# Patient Record
Sex: MALE | Race: WHITE | Employment: OTHER | URBAN - NONMETROPOLITAN AREA
[De-identification: names, ages, dates, MRNs, and addresses within clinical notes are randomized per-mention and may not be internally consistent; named-entity substitution may affect disease eponyms.]

---

## 2017-02-22 ENCOUNTER — TELEPHONE (OUTPATIENT)
Dept: CARDIOLOGY | Age: 72
End: 2017-02-22

## 2017-03-07 ENCOUNTER — TELEPHONE (OUTPATIENT)
Dept: CARDIOLOGY | Age: 72
End: 2017-03-07

## 2017-03-08 DIAGNOSIS — Z95.0 PACEMAKER: Primary | ICD-10-CM

## 2017-03-08 DIAGNOSIS — R00.1 BRADYCARDIA: ICD-10-CM

## 2017-03-08 PROCEDURE — 93296 REM INTERROG EVL PM/IDS: CPT | Performed by: CLINICAL NURSE SPECIALIST

## 2017-03-08 PROCEDURE — 93294 REM INTERROG EVL PM/LDLS PM: CPT | Performed by: CLINICAL NURSE SPECIALIST

## 2017-05-11 DIAGNOSIS — K21.9 GASTROESOPHAGEAL REFLUX DISEASE, ESOPHAGITIS PRESENCE NOT SPECIFIED: Primary | ICD-10-CM

## 2017-05-11 RX ORDER — OMEPRAZOLE 20 MG/1
40 CAPSULE, DELAYED RELEASE ORAL DAILY
Qty: 30 CAPSULE | Refills: 11 | Status: SHIPPED | OUTPATIENT
Start: 2017-05-11 | End: 2018-10-16 | Stop reason: SDUPTHER

## 2017-07-24 ENCOUNTER — OFFICE VISIT (OUTPATIENT)
Dept: UROLOGY | Age: 72
End: 2017-07-24
Payer: MEDICARE

## 2017-07-24 VITALS
OXYGEN SATURATION: 96 % | SYSTOLIC BLOOD PRESSURE: 128 MMHG | DIASTOLIC BLOOD PRESSURE: 74 MMHG | BODY MASS INDEX: 30.62 KG/M2 | TEMPERATURE: 97.2 F | WEIGHT: 231 LBS | HEIGHT: 73 IN | HEART RATE: 72 BPM

## 2017-07-24 DIAGNOSIS — N52.9 ERECTILE DYSFUNCTION OF ORGANIC ORIGIN: ICD-10-CM

## 2017-07-24 DIAGNOSIS — N40.1 BENIGN PROSTATIC HYPERPLASIA WITH LOWER URINARY TRACT SYMPTOMS, UNSPECIFIED MORPHOLOGY: Primary | ICD-10-CM

## 2017-07-24 LAB
BILIRUBIN, POC: NORMAL
BLOOD URINE, POC: NORMAL
CLARITY, POC: NORMAL
COLOR, POC: NORMAL
GLUCOSE URINE, POC: NORMAL
KETONES, POC: NORMAL
LEUKOCYTE EST, POC: NORMAL
NITRITE, POC: NORMAL
PH, POC: 5.5
PROTEIN, POC: NORMAL
SPECIFIC GRAVITY, POC: 1.02
UROBILINOGEN, POC: 0.2

## 2017-07-24 PROCEDURE — 1123F ACP DISCUSS/DSCN MKR DOCD: CPT | Performed by: UROLOGY

## 2017-07-24 PROCEDURE — G8419 CALC BMI OUT NRM PARAM NOF/U: HCPCS | Performed by: UROLOGY

## 2017-07-24 PROCEDURE — 1036F TOBACCO NON-USER: CPT | Performed by: UROLOGY

## 2017-07-24 PROCEDURE — 99214 OFFICE O/P EST MOD 30 MIN: CPT | Performed by: UROLOGY

## 2017-07-24 PROCEDURE — G8427 DOCREV CUR MEDS BY ELIG CLIN: HCPCS | Performed by: UROLOGY

## 2017-07-24 PROCEDURE — 81002 URINALYSIS NONAUTO W/O SCOPE: CPT | Performed by: UROLOGY

## 2017-07-24 PROCEDURE — 4040F PNEUMOC VAC/ADMIN/RCVD: CPT | Performed by: UROLOGY

## 2017-07-24 PROCEDURE — 3017F COLORECTAL CA SCREEN DOC REV: CPT | Performed by: UROLOGY

## 2017-07-24 ASSESSMENT — ENCOUNTER SYMPTOMS
BACK PAIN: 0
COUGH: 0
DOUBLE VISION: 0
BLURRED VISION: 0
ABDOMINAL PAIN: 0
VOMITING: 0
HEMOPTYSIS: 0
NAUSEA: 0

## 2017-08-08 ENCOUNTER — APPOINTMENT (OUTPATIENT)
Dept: GENERAL RADIOLOGY | Facility: HOSPITAL | Age: 72
End: 2017-08-08
Attending: FAMILY MEDICINE

## 2017-08-08 PROBLEM — Z95.0 PACEMAKER: Status: ACTIVE | Noted: 2017-08-08

## 2017-08-08 PROBLEM — R00.1 BRADYCARDIA: Status: ACTIVE | Noted: 2017-08-08

## 2017-08-08 PROBLEM — K27.9 PEPTIC ULCER DISEASE: Status: ACTIVE | Noted: 2017-08-08

## 2017-08-08 PROCEDURE — 71020 HC CHEST PA AND LATERAL: CPT

## 2017-08-17 ENCOUNTER — OFFICE VISIT (OUTPATIENT)
Dept: CARDIOLOGY | Age: 72
End: 2017-08-17
Payer: MEDICARE

## 2017-08-17 VITALS
HEART RATE: 71 BPM | BODY MASS INDEX: 30.75 KG/M2 | HEIGHT: 73 IN | WEIGHT: 232 LBS | SYSTOLIC BLOOD PRESSURE: 122 MMHG | DIASTOLIC BLOOD PRESSURE: 80 MMHG

## 2017-08-17 DIAGNOSIS — Z95.0 PACEMAKER: Primary | ICD-10-CM

## 2017-08-17 DIAGNOSIS — R00.1 BRADYCARDIA: ICD-10-CM

## 2017-08-17 PROCEDURE — 99213 OFFICE O/P EST LOW 20 MIN: CPT | Performed by: CLINICAL NURSE SPECIALIST

## 2017-08-17 PROCEDURE — 1036F TOBACCO NON-USER: CPT | Performed by: CLINICAL NURSE SPECIALIST

## 2017-08-17 PROCEDURE — 4040F PNEUMOC VAC/ADMIN/RCVD: CPT | Performed by: CLINICAL NURSE SPECIALIST

## 2017-08-17 PROCEDURE — G8427 DOCREV CUR MEDS BY ELIG CLIN: HCPCS | Performed by: CLINICAL NURSE SPECIALIST

## 2017-08-17 PROCEDURE — G8417 CALC BMI ABV UP PARAM F/U: HCPCS | Performed by: CLINICAL NURSE SPECIALIST

## 2017-08-17 PROCEDURE — 3017F COLORECTAL CA SCREEN DOC REV: CPT | Performed by: CLINICAL NURSE SPECIALIST

## 2017-08-17 PROCEDURE — 1123F ACP DISCUSS/DSCN MKR DOCD: CPT | Performed by: CLINICAL NURSE SPECIALIST

## 2017-08-17 PROCEDURE — 93288 INTERROG EVL PM/LDLS PM IP: CPT | Performed by: CLINICAL NURSE SPECIALIST

## 2017-11-20 DIAGNOSIS — R00.1 BRADYCARDIA: ICD-10-CM

## 2017-11-20 DIAGNOSIS — Z95.0 PACEMAKER: Primary | ICD-10-CM

## 2017-11-20 PROCEDURE — 93294 REM INTERROG EVL PM/LDLS PM: CPT | Performed by: NURSE PRACTITIONER

## 2017-11-20 PROCEDURE — 93296 REM INTERROG EVL PM/IDS: CPT | Performed by: NURSE PRACTITIONER

## 2018-02-22 ENCOUNTER — TELEPHONE (OUTPATIENT)
Dept: CARDIOLOGY | Age: 73
End: 2018-02-22

## 2018-02-23 DIAGNOSIS — Z95.0 PACEMAKER: Primary | ICD-10-CM

## 2018-02-23 DIAGNOSIS — R00.1 BRADYCARDIA: ICD-10-CM

## 2018-02-23 PROCEDURE — 93294 REM INTERROG EVL PM/LDLS PM: CPT | Performed by: CLINICAL NURSE SPECIALIST

## 2018-02-23 PROCEDURE — 93296 REM INTERROG EVL PM/IDS: CPT | Performed by: CLINICAL NURSE SPECIALIST

## 2018-05-29 ENCOUNTER — TELEPHONE (OUTPATIENT)
Dept: CARDIOLOGY | Age: 73
End: 2018-05-29

## 2018-06-07 ENCOUNTER — TELEPHONE (OUTPATIENT)
Dept: GASTROENTEROLOGY | Facility: CLINIC | Age: 73
End: 2018-06-07

## 2018-06-07 DIAGNOSIS — Z95.0 PACEMAKER: Primary | ICD-10-CM

## 2018-06-07 DIAGNOSIS — R00.1 BRADYCARDIA: ICD-10-CM

## 2018-06-07 PROCEDURE — 93294 REM INTERROG EVL PM/LDLS PM: CPT | Performed by: CLINICAL NURSE SPECIALIST

## 2018-06-07 PROCEDURE — 93296 REM INTERROG EVL PM/IDS: CPT | Performed by: CLINICAL NURSE SPECIALIST

## 2018-06-07 NOTE — TELEPHONE ENCOUNTER
Patients wife called requesting refill of Omeprzole 40 mg 1 daily 90 day supply phoned to [facility] [place] pt agreed to make follow up he hasn't been seen here in over 2 years I phoned in # [phone] per Dr. Yap

## 2018-07-18 DIAGNOSIS — K21.9 GASTROESOPHAGEAL REFLUX DISEASE, ESOPHAGITIS PRESENCE NOT SPECIFIED: Primary | ICD-10-CM

## 2018-07-19 RX ORDER — OMEPRAZOLE 40 MG/1
40 CAPSULE, DELAYED RELEASE ORAL DAILY
Qty: 90 CAPSULE | Refills: 1 | Status: SHIPPED | OUTPATIENT
Start: 2018-07-19 | End: 2018-10-16 | Stop reason: SDUPTHER

## 2018-07-25 DIAGNOSIS — N40.1 BENIGN PROSTATIC HYPERPLASIA WITH LOWER URINARY TRACT SYMPTOMS: ICD-10-CM

## 2018-07-25 LAB — PROSTATE SPECIFIC ANTIGEN: 2.59 NG/ML (ref 0–4)

## 2018-08-29 ENCOUNTER — OFFICE VISIT (OUTPATIENT)
Dept: CARDIOLOGY | Age: 73
End: 2018-08-29
Payer: MEDICARE

## 2018-08-29 VITALS
WEIGHT: 231 LBS | DIASTOLIC BLOOD PRESSURE: 76 MMHG | SYSTOLIC BLOOD PRESSURE: 112 MMHG | HEIGHT: 73 IN | HEART RATE: 68 BPM | BODY MASS INDEX: 30.62 KG/M2

## 2018-08-29 DIAGNOSIS — R00.1 BRADYCARDIA: ICD-10-CM

## 2018-08-29 DIAGNOSIS — Z95.0 PACEMAKER: Primary | ICD-10-CM

## 2018-08-29 PROCEDURE — 4040F PNEUMOC VAC/ADMIN/RCVD: CPT | Performed by: INTERNAL MEDICINE

## 2018-08-29 PROCEDURE — G8417 CALC BMI ABV UP PARAM F/U: HCPCS | Performed by: INTERNAL MEDICINE

## 2018-08-29 PROCEDURE — 1101F PT FALLS ASSESS-DOCD LE1/YR: CPT | Performed by: INTERNAL MEDICINE

## 2018-08-29 PROCEDURE — 1036F TOBACCO NON-USER: CPT | Performed by: INTERNAL MEDICINE

## 2018-08-29 PROCEDURE — 99212 OFFICE O/P EST SF 10 MIN: CPT | Performed by: INTERNAL MEDICINE

## 2018-08-29 PROCEDURE — G8427 DOCREV CUR MEDS BY ELIG CLIN: HCPCS | Performed by: INTERNAL MEDICINE

## 2018-08-29 PROCEDURE — 3017F COLORECTAL CA SCREEN DOC REV: CPT | Performed by: INTERNAL MEDICINE

## 2018-08-29 PROCEDURE — 1123F ACP DISCUSS/DSCN MKR DOCD: CPT | Performed by: INTERNAL MEDICINE

## 2018-08-29 PROCEDURE — 93280 PM DEVICE PROGR EVAL DUAL: CPT | Performed by: INTERNAL MEDICINE

## 2018-08-29 RX ORDER — LISINOPRIL 10 MG/1
10 TABLET ORAL DAILY
COMMUNITY

## 2018-08-29 RX ORDER — LOVASTATIN 40 MG/1
40 TABLET ORAL NIGHTLY
COMMUNITY

## 2018-08-29 ASSESSMENT — ENCOUNTER SYMPTOMS
BACK PAIN: 0
CHOKING: 0
BLOOD IN STOOL: 0
NAUSEA: 0
APNEA: 0
COUGH: 0
WHEEZING: 0
ABDOMINAL DISTENTION: 0
SHORTNESS OF BREATH: 0
CHEST TIGHTNESS: 0

## 2018-08-29 NOTE — PROGRESS NOTES
confusion, dysphoric mood and sleep disturbance. The patient is not nervous/anxious. /76   Pulse 68   Ht 6' 1\" (1.854 m)   Wt 231 lb (104.8 kg)   BMI 30.48 kg/m²   Physical Exam   Constitutional: He is oriented to person, place, and time. He appears well-developed and well-nourished. No distress. Nagi complexion    HENT:   Head: Normocephalic. Eyes: Pupils are equal, round, and reactive to light. Conjunctivae and EOM are normal.   Neck: No JVD present. Carotid bruit is not present. No thyromegaly present. Cardiovascular: Normal rate and regular rhythm. PMI is not displaced. Exam reveals no gallop and no friction rub. No murmur heard. Pulmonary/Chest: Breath sounds normal. No respiratory distress. He has no wheezes. He has no rales. He exhibits no tenderness. Pacer pocket well-healed   Abdominal: Soft. Bowel sounds are normal. He exhibits no distension and no mass. There is no tenderness. There is no rebound and no guarding. Mild abdominal obesity   Musculoskeletal: He exhibits no deformity. Neurological: He is alert and oriented to person, place, and time. Skin: Skin is warm. No rash noted. He is not diaphoretic. No erythema. No pallor. Vitals reviewed. No orders of the defined types were placed in this encounter. No orders of the defined types were placed in this encounter. ECG interpretation from today:   Pacer interrogation revealed a single two-hour episode of atrial fibrillation in May without any other abnormalities. Assessment / Plan:  1. Pacemaker - normal device function with single episode of AF lasting but two hours (unfortunately asymptomatic). No changes in therapy and does not prompt further address at this time. 2. Hypertension - controlled  3.  Dyslipidemia - reportedly controlled    Electronically sign by Myla Lord MD 8/29/2018 2:44 PM

## 2018-09-26 ENCOUNTER — OFFICE VISIT (OUTPATIENT)
Dept: UROLOGY | Age: 73
End: 2018-09-26
Payer: MEDICARE

## 2018-09-26 VITALS
HEIGHT: 73 IN | RESPIRATION RATE: 16 BRPM | TEMPERATURE: 96.8 F | WEIGHT: 230 LBS | BODY MASS INDEX: 30.48 KG/M2 | DIASTOLIC BLOOD PRESSURE: 86 MMHG | HEART RATE: 79 BPM | SYSTOLIC BLOOD PRESSURE: 135 MMHG

## 2018-09-26 DIAGNOSIS — N40.0 BENIGN PROSTATIC HYPERPLASIA, UNSPECIFIED WHETHER LOWER URINARY TRACT SYMPTOMS PRESENT: Primary | ICD-10-CM

## 2018-09-26 DIAGNOSIS — N52.9 ERECTILE DYSFUNCTION OF ORGANIC ORIGIN: ICD-10-CM

## 2018-09-26 LAB
BILIRUBIN, POC: NORMAL
BLOOD URINE, POC: NORMAL
CLARITY, POC: CLEAR
COLOR, POC: YELLOW
GLUCOSE URINE, POC: NORMAL
KETONES, POC: NORMAL
LEUKOCYTE EST, POC: NORMAL
NITRITE, POC: NORMAL
PH, POC: 7
PROTEIN, POC: NORMAL
SPECIFIC GRAVITY, POC: 1.01
UROBILINOGEN, POC: 0.2

## 2018-09-26 PROCEDURE — 1101F PT FALLS ASSESS-DOCD LE1/YR: CPT | Performed by: UROLOGY

## 2018-09-26 PROCEDURE — 1123F ACP DISCUSS/DSCN MKR DOCD: CPT | Performed by: UROLOGY

## 2018-09-26 PROCEDURE — G8427 DOCREV CUR MEDS BY ELIG CLIN: HCPCS | Performed by: UROLOGY

## 2018-09-26 PROCEDURE — 1036F TOBACCO NON-USER: CPT | Performed by: UROLOGY

## 2018-09-26 PROCEDURE — 4040F PNEUMOC VAC/ADMIN/RCVD: CPT | Performed by: UROLOGY

## 2018-09-26 PROCEDURE — G8417 CALC BMI ABV UP PARAM F/U: HCPCS | Performed by: UROLOGY

## 2018-09-26 PROCEDURE — 81003 URINALYSIS AUTO W/O SCOPE: CPT | Performed by: UROLOGY

## 2018-09-26 PROCEDURE — 99213 OFFICE O/P EST LOW 20 MIN: CPT | Performed by: UROLOGY

## 2018-09-26 PROCEDURE — 3017F COLORECTAL CA SCREEN DOC REV: CPT | Performed by: UROLOGY

## 2018-09-26 ASSESSMENT — ENCOUNTER SYMPTOMS
RESPIRATORY NEGATIVE: 1
EYES NEGATIVE: 1
GASTROINTESTINAL NEGATIVE: 1

## 2018-09-26 NOTE — LETTER
[facility]  [address] [place] [address] Drive, [address]  [address] [address]  Phone: [phone]  Fax: [phone]    Jennifer Giraldo MD        September 26, 2018     Dixie Giang MD  [address] [place] [address]  [address] [facility] [id number]      Patient: Jason Tolliver   MR Number: [id number]   YOB: 1945   Date of Visit: 9/26/2018       Dear Provider: Thank you for referring Cat Lal to me for evaluation. Below are the relevant portions of my assessment and plan of care. If you have questions, please do not hesitate to call me. I look forward to following Merlin along with you.     Sincerely,        Jennifer Giraldo MD

## 2018-09-26 NOTE — PROGRESS NOTES
Kiel Yao is a 74 y.o. male who presents today   Chief Complaint   Patient presents with    Follow-up     yearly follow up  bph  psa  done        Benign Prostatic Hypertrophy  Patient complains of lower urinary tract symptoms. He reports frequency, intermittency and nocturia two times a night. He denies straining. Patient states symptoms are of moderate severity. Onset of symptoms was several years ago and was gradual in onset. His AUA Symptom Score is, 14/35 . He has no personal history and no family history of prostate cancer. He reports a history of Erectile dysfunction. He is tried oral medication and this is not effective he says he desires no further intervention. He denies flank pain, gross hematuria, kidney stones and recurrent UTI. Past Medical History:   Diagnosis Date    BCC (basal cell carcinoma of skin)     BPH (benign prostatic hyperplasia) 6/15/2016    Bradycardia     Dysmetabolic syndrome     Elevated PSA     Fatty liver     Gout     Hypercholesteremia     Dr. Britney Neri manages    Hyperlipidemia     Hypertension     Pacemaker 9/9/13  MDL    generator replacement    Peptic ulcer disease     Stomach cancer ([facility] [address]        Past Surgical History:   Procedure Laterality Date    CERVICAL SPINE SURGERY      COLONOSCOPY      CYST REMOVAL      GASTRECTOMY      HEMICOLECTOMY      PACEMAKER INSERTION  9/6/13    generator change    UPPER GASTROINTESTINAL ENDOSCOPY         Current Outpatient Prescriptions   Medication Sig Dispense Refill    lisinopril (PRINIVIL;ZESTRIL) 10 MG tablet Take 10 mg by mouth daily      lovastatin (MEVACOR) 40 MG tablet Take 40 mg by mouth nightly      aspirin 81 MG tablet Take 81 mg by mouth daily      omeprazole (PRILOSEC) 40 MG capsule Take 1 capsule by mouth daily      cetirizine (ZYRTEC ALLERGY) 10 MG tablet Take 10 mg by mouth daily.  Multiple Vitamins-Minerals (THERAPEUTIC MULTIVITAMIN-MINERALS) tablet Take 1 tablet by mouth daily.       is enlarged (65 grams smooth no nodularity). Prostate is not tender. Right testis shows no mass, no swelling and no tenderness. Left testis shows no mass, no swelling and no tenderness. Circumcised. No phimosis. No discharge found. Musculoskeletal: Normal range of motion. He exhibits no edema or tenderness. Lymphadenopathy:     He has no cervical adenopathy. Right: No inguinal adenopathy present. Left: No inguinal adenopathy present. Neurological: He is alert and oriented to person, place, and time. Skin: Skin is warm and dry. Psychiatric: He has a normal mood and affect. His behavior is normal.   Nursing note and vitals reviewed. DATA:  CMP:    Lab Results   Component Value Date     09/09/2013    K 4.3 09/09/2013     09/09/2013    CO2 25 09/09/2013    BUN 16 09/09/2013    CREATININE 0.8 09/09/2013    LABGLOM 102 09/09/2013    GLUCOSE 110 09/09/2013    CALCIUM 9.2 09/09/2013     Results for orders placed or performed in visit on 09/26/18   POCT Urinalysis No Micro (Auto)   Result Value Ref Range    Color, UA yellow     Clarity, UA clear     Glucose, UA POC neg     Bilirubin, UA neg     Ketones, UA neg     Spec Grav, UA 1.015     Blood, UA POC neg     pH, UA 7.0     Protein, UA POC neg     Urobilinogen, UA 0.2     Leukocytes, UA neg     Nitrite, UA neg      Lab Results   Component Value Date    PSA 2.59 07/25/2018    PSA 2.91 06/10/2016         1. Benign prostatic hyperplasia, unspecified whether lower urinary tract symptoms present  Symptoms are stable and unchanged from one year ago. We also discussed medical management he is not interested at this time  - POCT Urinalysis No Micro (Auto)  - PSA, Diagnostic; Future    2.  Erectile dysfunction of organic origin  He failed oral medical therapy he desires no further management or intervention  - POCT Urinalysis No Micro (Auto)      Orders Placed This Encounter   Procedures    PSA, Diagnostic     In 1 year prior to the next visit     Standing Status:   Future     Standing Expiration Date:   9/26/2020    POCT Urinalysis No Micro (Auto)        Return in about 1 year (around 9/26/2019) for PSA prior to vext visit, Okay to follow-up with NETTE (NP/PA).

## 2018-10-16 ENCOUNTER — OFFICE VISIT (OUTPATIENT)
Dept: GASTROENTEROLOGY | Facility: CLINIC | Age: 73
End: 2018-10-16

## 2018-10-16 VITALS
WEIGHT: 229 LBS | BODY MASS INDEX: 30.35 KG/M2 | OXYGEN SATURATION: 98 % | DIASTOLIC BLOOD PRESSURE: 80 MMHG | HEART RATE: 86 BPM | SYSTOLIC BLOOD PRESSURE: 130 MMHG | HEIGHT: 73 IN

## 2018-10-16 DIAGNOSIS — K21.9 GASTROESOPHAGEAL REFLUX DISEASE, ESOPHAGITIS PRESENCE NOT SPECIFIED: Primary | ICD-10-CM

## 2018-10-16 DIAGNOSIS — Z78.9 NONSMOKER: ICD-10-CM

## 2018-10-16 DIAGNOSIS — E66.9 OBESITY, UNSPECIFIED OBESITY SEVERITY, UNSPECIFIED OBESITY TYPE: ICD-10-CM

## 2018-10-16 DIAGNOSIS — K22.70 BARRETT'S ESOPHAGUS WITHOUT DYSPLASIA: ICD-10-CM

## 2018-10-16 PROCEDURE — 99212 OFFICE O/P EST SF 10 MIN: CPT | Performed by: CLINICAL NURSE SPECIALIST

## 2018-10-16 RX ORDER — ASPIRIN 81 MG/1
81 TABLET ORAL DAILY
COMMUNITY

## 2018-10-16 RX ORDER — OMEPRAZOLE 40 MG/1
40 CAPSULE, DELAYED RELEASE ORAL DAILY
Qty: 90 CAPSULE | Refills: 4 | Status: SHIPPED | OUTPATIENT
Start: 2018-10-16 | End: 2019-11-20 | Stop reason: SDUPTHER

## 2018-10-16 RX ORDER — LISINOPRIL 10 MG/1
10 TABLET ORAL DAILY
COMMUNITY
End: 2020-10-13 | Stop reason: SDUPTHER

## 2018-10-16 NOTE — PROGRESS NOTES
Merlin W Ihnen  1945      10/16/2018  Chief Complaint   Patient presents with   • GI Problem     Barretts Esophagus needs refills of Omeprazole          HPI    Merlin W Ihnen is a  73 y.o. male here for a follow up visit for GERD with hx of Barretts. He is stable with his Omeprazole daily. No difficulty swallowing. No nausea or vomiting. No change in bowels. No BRBPR. No melena.     Past Medical History:   Diagnosis Date   • Cancer (CMS/HCC)    • Gastrointestinal stromal tumor (GIST) (CMS/HCC)    • GERD (gastroesophageal reflux disease)    • Liver cirrhosis (CMS/HCC)    • Squamous cell skin cancer      Past Surgical History:   Procedure Laterality Date   • COLON SURGERY     • COLONOSCOPY W/ POLYPECTOMY  07/19/2016    Polyp at 70 cm proximal to anus no changes of adenomatous or hyperplastic polyp identified repeat exam in 5 years   • COLONOSCOPY W/ POLYPECTOMY  04/19/2013    Early Tubular adenoma repeat exam in 3 years   • ENDOSCOPY  07/19/2016    Chronic esophagogastritis mild with Intestinal Metaplasia repeat exam in 3 years   • ENDOSCOPY  04/19/2013    Mild chronic inflammation negative for Intestinal Metaplasia    • NECK SURGERY     • PACEMAKER IMPLANTATION     • SQUAMOUS CELL CARCINOMA EXCISION         Outpatient Prescriptions Marked as Taking for the 10/16/18 encounter (Office Visit) with Mame Warner APRN   Medication Sig Dispense Refill   • aspirin 81 MG EC tablet Take 81 mg by mouth Daily. Three times a week     • Avanafil 200 MG tablet Take  by mouth.     • cetirizine (zyrTEC) 10 MG tablet Take  by mouth.     • Cholecalciferol (VITAMIN D3) 2000 UNITS capsule Take  by mouth.     • lisinopril (PRINIVIL,ZESTRIL) 10 MG tablet Take 10 mg by mouth Daily.     • lovastatin (MEVACOR) 20 MG tablet Take  by mouth.     • omeprazole (priLOSEC) 40 MG capsule Take 1 capsule by mouth Daily. 90 capsule 4   • therapeutic multivitamin-minerals (THERAGRAN-M) tablet Take  by mouth.     • [DISCONTINUED] omeprazole  (priLOSEC) 40 MG capsule Take 1 capsule by mouth Daily. 90 capsule 1       Allergies   Allergen Reactions   • Amoxicillin Itching   • Penicillins Itching       Social History     Social History   • Marital status:      Spouse name: N/A   • Number of children: N/A   • Years of education: N/A     Occupational History   • Not on file.     Social History Main Topics   • Smoking status: Former Smoker     Packs/day: 0.50     Years: 10.00     Types: Cigarettes   • Smokeless tobacco: Never Used   • Alcohol use No   • Drug use: No   • Sexual activity: Defer     Other Topics Concern   • Not on file     Social History Narrative   • No narrative on file       Family History   Problem Relation Age of Onset   • Colon polyps Neg Hx    • Colon cancer Neg Hx        Review of Systems   Constitutional: Negative for activity change, appetite change, chills, diaphoresis, fatigue, fever and unexpected weight change.   HENT: Negative for ear pain, hearing loss, mouth sores, sore throat, trouble swallowing and voice change.    Eyes: Negative.    Respiratory: Negative for cough, choking, shortness of breath and wheezing.    Cardiovascular: Negative for chest pain and palpitations.   Gastrointestinal: Negative for abdominal pain, blood in stool, constipation, diarrhea, nausea and vomiting.   Endocrine: Negative for cold intolerance and heat intolerance.   Genitourinary: Negative for decreased urine volume, dysuria, frequency, hematuria and urgency.   Musculoskeletal: Negative for back pain, gait problem and myalgias.   Skin: Negative for color change, pallor and rash.   Allergic/Immunologic: Negative for food allergies and immunocompromised state.   Neurological: Negative for dizziness, tremors, seizures, syncope, weakness, light-headedness, numbness and headaches.   Hematological: Negative for adenopathy. Does not bruise/bleed easily.   Psychiatric/Behavioral: Negative for agitation and confusion. The patient is not nervous/anxious.   "  All other systems reviewed and are negative.      /80   Pulse 86   Ht 185.4 cm (73\")   Wt 104 kg (229 lb)   SpO2 98%   BMI 30.21 kg/m²   Body mass index is 30.21 kg/m².    Physical Exam   Constitutional: He is oriented to person, place, and time. He appears well-developed and well-nourished.   HENT:   Head: Normocephalic and atraumatic.   Eyes: Pupils are equal, round, and reactive to light.   Neck: Normal range of motion. Neck supple. No tracheal deviation present.   Cardiovascular: Normal rate, regular rhythm and normal heart sounds.  Exam reveals no gallop and no friction rub.    No murmur heard.  Pulmonary/Chest: Effort normal and breath sounds normal. No respiratory distress. He has no wheezes. He has no rales. He exhibits no tenderness.   Abdominal: Soft. Bowel sounds are normal. He exhibits no distension. There is no hepatosplenomegaly. There is no tenderness. There is no rigidity, no rebound and no guarding.   Musculoskeletal: Normal range of motion. He exhibits no edema, tenderness or deformity.   Neurological: He is alert and oriented to person, place, and time. He has normal reflexes.   Skin: Skin is warm and dry. No rash noted. No pallor.   Psychiatric: He has a normal mood and affect. His behavior is normal. Judgment and thought content normal.       ASSESSMENT AND PLAN    Merlin was seen today for gi problem.    Diagnoses and all orders for this visit:    Gastroesophageal reflux disease, esophagitis presence not specified  -     omeprazole (priLOSEC) 40 MG capsule; Take 1 capsule by mouth Daily.    Fleming's esophagus without dysplasia    Nonsmoker    Obesity, unspecified obesity severity, unspecified obesity type      Cont ongoing therapy.   I discussed non pharmaceutical treatment of gerd.  This includes gradually losing weight to achieve ideal body wt., elevation of the head of bed by 4-6 inches, nothing to eat or drink 3 hours prior to lying down, avoiding tight clothing, stress " reduction, tobacco cessation, reduction of alcohol intake, and dietary restrictions (avoiding caffeine, coffee, fatty foods, mints, chocolate, spicy foods and tomato based sauces as much as possible).        There are no Patient Instructions on file for this visit.  Mame Warner, MARIAN  11:19 AM  10/16/2018    Obesity, Adult  Obesity is the condition of having too much total body fat. Being overweight or obese means that your weight is greater than what is considered healthy for your body size. Obesity is determined by a measurement called BMI. BMI is an estimate of body fat and is calculated from height and weight. For adults, a BMI of 30 or higher is considered obese.  Obesity can eventually lead to other health concerns and major illnesses, including:  · Stroke.  · Coronary artery disease (CAD).  · Type 2 diabetes.  · Some types of cancer, including cancers of the colon, breast, uterus, and gallbladder.  · Osteoarthritis.  · High blood pressure (hypertension).  · High cholesterol.  · Sleep apnea.  · Gallbladder stones.  · Infertility problems.  What are the causes?  The main cause of obesity is taking in (consuming) more calories than your body uses for energy. Other factors that contribute to this condition may include:  · Being born with genes that make you more likely to become obese.  · Having a medical condition that causes obesity. These conditions include:  ¨ Hypothyroidism.  ¨ Polycystic ovarian syndrome (PCOS).  ¨ Binge-eating disorder.  ¨ Cushing syndrome.  · Taking certain medicines, such as steroids, antidepressants, and seizure medicines.  · Not being physically active (sedentary lifestyle).  · Living where there are limited places to exercise safely or buy healthy foods.  · Not getting enough sleep.  What increases the risk?  The following factors may increase your risk of this condition:  · Having a family history of obesity.  · Being a woman of -American descent.  · Being a man of   descent.  What are the signs or symptoms?  Having excessive body fat is the main symptom of this condition.  How is this diagnosed?  This condition may be diagnosed based on:  · Your symptoms.  · Your medical history.  · A physical exam. Your health care provider may measure:  ¨ Your BMI. If you are an adult with a BMI between 25 and less than 30, you are considered overweight. If you are an adult with a BMI of 30 or higher, you are considered obese.  ¨ The distances around your hips and your waist (circumferences). These may be compared to each other to help diagnose your condition.  ¨ Your skinfold thickness. Your health care provider may gently pinch a fold of your skin and measure it.  How is this treated?  Treatment for this condition often includes changing your lifestyle. Treatment may include some or all of the following:  · Dietary changes. Work with your health care provider and a dietitian to set a weight-loss goal that is healthy and reasonable for you. Dietary changes may include eating:  ¨ Smaller portions. A portion size is the amount of a particular food that is healthy for you to eat at one time. This varies from person to person.  ¨ Low-calorie or low-fat options.  ¨ More whole grains, fruits, and vegetables.  · Regular physical activity. This may include aerobic activity (cardio) and strength training.  · Medicine to help you lose weight. Your health care provider may prescribe medicine if you are unable to lose 1 pound a week after 6 weeks of eating more healthily and doing more physical activity.  · Surgery. Surgical options may include gastric banding and gastric bypass. Surgery may be done if:  ¨ Other treatments have not helped to improve your condition.  ¨ You have a BMI of 40 or higher.  ¨ You have life-threatening health problems related to obesity.  Follow these instructions at home:     Eating and drinking     · Follow recommendations from your health care provider about what  you eat and drink. Your health care provider may advise you to:  ¨ Limit fast foods, sweets, and processed snack foods.  ¨ Choose low-fat options, such as low-fat milk instead of whole milk.  ¨ Eat 5 or more servings of fruits or vegetables every day.  ¨ Eat at home more often. This gives you more control over what you eat.  ¨ Choose healthy foods when you eat out.  ¨ Learn what a healthy portion size is.  ¨ Keep low-fat snacks on hand.  ¨ Avoid sugary drinks, such as soda, fruit juice, iced tea sweetened with sugar, and flavored milk.  ¨ Eat a healthy breakfast.  · Drink enough water to keep your urine clear or pale yellow.  · Do not go without eating for long periods of time (do not fast) or follow a fad diet. Fasting and fad diets can be unhealthy and even dangerous.  Physical Activity   · Exercise regularly, as told by your health care provider. Ask your health care provider what types of exercise are safe for you and how often you should exercise.  · Warm up and stretch before being active.  · Cool down and stretch after being active.  · Rest between periods of activity.  Lifestyle   · Limit the time that you spend in front of your TV, computer, or video game system.  · Find ways to reward yourself that do not involve food.  · Limit alcohol intake to no more than 1 drink a day for nonpregnant women and 2 drinks a day for men. One drink equals 12 oz of beer, 5 oz of wine, or 1½ oz of hard liquor.  General instructions   · Keep a weight loss journal to keep track of the food you eat and how much you exercise you get.  · Take over-the-counter and prescription medicines only as told by your health care provider.  · Take vitamins and supplements only as told by your health care provider.  · Consider joining a support group. Your health care provider may be able to recommend a support group.  · Keep all follow-up visits as told by your health care provider. This is important.  Contact a health care provider  if:  · You are unable to meet your weight loss goal after 6 weeks of dietary and lifestyle changes.  This information is not intended to replace advice given to you by your health care provider. Make sure you discuss any questions you have with your health care provider.  Document Released: 01/25/2006 Document Revised: 05/22/2017 Document Reviewed: 10/05/2016  [employer] Patient Education © 2017 [employer]      IF YOU SMOKE OR USE TOBACCO PLEASE READ THE FOLLOWING:    Why is smoking bad for me?  Smoking increases the risk of heart disease, lung disease, vascular disease, stroke, and cancer.     If you smoke, STOP!    If you would like more information on quitting smoking, please visit the [employer] website: www.[employer]/corporate/healthier-together/smoke   This link will provide additional resources including the QUIT line and the Beat the Pack support groups.     For more information:    Quit Now [place]  1-800-QUIT-NOW  https://[place].quitlogix.org/en-[place]/

## 2018-10-26 ENCOUNTER — TELEPHONE (OUTPATIENT)
Dept: CARDIOLOGY | Age: 73
End: 2018-10-26

## 2018-11-28 DIAGNOSIS — Z95.0 PACEMAKER: Primary | ICD-10-CM

## 2018-11-28 DIAGNOSIS — R00.1 BRADYCARDIA: ICD-10-CM

## 2018-11-28 PROCEDURE — 93296 REM INTERROG EVL PM/IDS: CPT | Performed by: CLINICAL NURSE SPECIALIST

## 2018-11-28 PROCEDURE — 93294 REM INTERROG EVL PM/LDLS PM: CPT | Performed by: CLINICAL NURSE SPECIALIST

## 2019-02-28 DIAGNOSIS — R00.1 BRADYCARDIA: ICD-10-CM

## 2019-02-28 DIAGNOSIS — Z95.0 PACEMAKER: Primary | ICD-10-CM

## 2019-03-01 PROCEDURE — 93294 REM INTERROG EVL PM/LDLS PM: CPT | Performed by: CLINICAL NURSE SPECIALIST

## 2019-03-01 PROCEDURE — 93296 REM INTERROG EVL PM/IDS: CPT | Performed by: CLINICAL NURSE SPECIALIST

## 2019-05-30 DIAGNOSIS — Z95.0 PACEMAKER: Primary | ICD-10-CM

## 2019-05-30 DIAGNOSIS — R00.1 BRADYCARDIA: ICD-10-CM

## 2019-05-30 PROCEDURE — 99999 PR OFFICE/OUTPT VISIT,PROCEDURE ONLY: CPT | Performed by: NURSE PRACTITIONER

## 2019-06-19 ENCOUNTER — OFFICE VISIT (OUTPATIENT)
Dept: GASTROENTEROLOGY | Facility: CLINIC | Age: 74
End: 2019-06-19

## 2019-06-19 VITALS
SYSTOLIC BLOOD PRESSURE: 132 MMHG | BODY MASS INDEX: 29.16 KG/M2 | DIASTOLIC BLOOD PRESSURE: 70 MMHG | OXYGEN SATURATION: 98 % | HEART RATE: 72 BPM | HEIGHT: 73 IN | WEIGHT: 220 LBS

## 2019-06-19 DIAGNOSIS — I10 HTN (HYPERTENSION), BENIGN: ICD-10-CM

## 2019-06-19 DIAGNOSIS — K21.00 GASTROESOPHAGEAL REFLUX DISEASE WITH ESOPHAGITIS: ICD-10-CM

## 2019-06-19 DIAGNOSIS — K22.70 BARRETT'S ESOPHAGUS WITHOUT DYSPLASIA: Primary | ICD-10-CM

## 2019-06-19 DIAGNOSIS — Z78.9 NONSMOKER: ICD-10-CM

## 2019-06-19 DIAGNOSIS — E66.9 OBESITY, UNSPECIFIED OBESITY SEVERITY, UNSPECIFIED OBESITY TYPE: ICD-10-CM

## 2019-06-19 PROCEDURE — 99213 OFFICE O/P EST LOW 20 MIN: CPT | Performed by: CLINICAL NURSE SPECIALIST

## 2019-06-19 NOTE — PROGRESS NOTES
Chief Complaint   Patient presents with   • Endoscopy     Subjective   HPI  Merlin W Ihnen is a 74 y.o. male who presents with hx of Barretts esophagus determined by biopsy. Course is constant.  Disease is stable , maintains on Prilosec for the management of this. Last Endoscopy reviewed. He has no complaints of nausea or vomiting. No change in bowels. No wt loss. No BRBPR. No melena. No abdominal pain or dysphagia.  Past Medical History:   Diagnosis Date   • Cancer (CMS/HCC)    • Gastrointestinal stromal tumor (GIST) (CMS/HCC)    • GERD (gastroesophageal reflux disease)    • Liver cirrhosis (CMS/HCC)    • Squamous cell skin cancer      Past Surgical History:   Procedure Laterality Date   • COLON SURGERY     • COLONOSCOPY W/ POLYPECTOMY  07/19/2016    Polyp at 70 cm proximal to anus no changes of adenomatous or hyperplastic polyp identified repeat exam in 5 years   • COLONOSCOPY W/ POLYPECTOMY  04/19/2013    Early Tubular adenoma repeat exam in 3 years   • ENDOSCOPY  07/19/2016    Chronic esophagogastritis mild with Intestinal Metaplasia repeat exam in 3 years   • ENDOSCOPY  04/19/2013    Mild chronic inflammation negative for Intestinal Metaplasia    • NECK SURGERY     • PACEMAKER IMPLANTATION     • SQUAMOUS CELL CARCINOMA EXCISION             Current Outpatient Medications:   •  aspirin 81 MG EC tablet, Take 81 mg by mouth Daily. Three times a week, Disp: , Rfl:   •  Avanafil 200 MG tablet, Take  by mouth., Disp: , Rfl:   •  cetirizine (zyrTEC) 10 MG tablet, Take  by mouth., Disp: , Rfl:   •  Cholecalciferol (VITAMIN D3) 2000 UNITS capsule, Take  by mouth., Disp: , Rfl:   •  lisinopril (PRINIVIL,ZESTRIL) 10 MG tablet, Take 10 mg by mouth Daily., Disp: , Rfl:   •  lovastatin (MEVACOR) 20 MG tablet, Take  by mouth., Disp: , Rfl:   •  omeprazole (priLOSEC) 40 MG capsule, Take 1 capsule by mouth Daily., Disp: 90 capsule, Rfl: 4  •  therapeutic multivitamin-minerals (THERAGRAN-M) tablet, Take  by mouth., Disp: , Rfl:    Allergies   Allergen Reactions   • Amoxicillin Itching   • Penicillins Itching     Social History     Socioeconomic History   • Marital status:      Spouse name: Not on file   • Number of children: Not on file   • Years of education: Not on file   • Highest education level: Not on file   Tobacco Use   • Smoking status: Former Smoker     Packs/day: 0.50     Years: 10.00     Pack years: 5.00     Types: Cigarettes   • Smokeless tobacco: Never Used   Substance and Sexual Activity   • Alcohol use: No   • Drug use: No   • Sexual activity: Defer     Family History   Problem Relation Age of Onset   • Colon polyps Neg Hx    • Colon cancer Neg Hx      Health Maintenance   Topic Date Due   • TDAP/TD VACCINES (1 - Tdap) 01/30/1962   • ZOSTER VACCINE (1 of 2) 01/30/1993   • PNEUMOCOCCAL VACCINES (65+ LOW/MEDIUM RISK) (1 of 2 - PCV13) 01/30/2008   • MEDICARE ANNUAL WELLNESS  05/11/2017   • INFLUENZA VACCINE  08/01/2019   • COLONOSCOPY  07/19/2026     Review of Systems   Constitutional: Negative for activity change, appetite change, chills, diaphoresis, fatigue, fever and unexpected weight change.   HENT: Negative for ear pain, hearing loss, mouth sores, sore throat, trouble swallowing and voice change.    Eyes: Negative.    Respiratory: Negative for cough, choking, shortness of breath and wheezing.    Cardiovascular: Negative for chest pain and palpitations.   Gastrointestinal: Negative for abdominal pain, blood in stool, constipation, diarrhea, nausea and vomiting.   Endocrine: Negative for cold intolerance and heat intolerance.   Genitourinary: Negative for decreased urine volume, dysuria, frequency, hematuria and urgency.   Musculoskeletal: Negative for back pain, gait problem and myalgias.   Skin: Negative for color change, pallor and rash.   Allergic/Immunologic: Negative for food allergies and immunocompromised state.   Neurological: Negative for dizziness, tremors, seizures, syncope, weakness, light-headedness,  "numbness and headaches.   Hematological: Negative for adenopathy. Does not bruise/bleed easily.   Psychiatric/Behavioral: Negative for agitation and confusion. The patient is not nervous/anxious.    All other systems reviewed and are negative.    Objective   Vitals:    06/19/19 0953   BP: 132/70   Pulse: 72   SpO2: 98%   Weight: 99.8 kg (220 lb)   Height: 185.4 cm (73\")     Body mass index is 29.03 kg/m².    Physical Exam   Constitutional: He is oriented to person, place, and time. He appears well-developed and well-nourished.   HENT:   Head: Normocephalic and atraumatic.   Eyes: Pupils are equal, round, and reactive to light.   Neck: Normal range of motion. Neck supple. No tracheal deviation present.   Cardiovascular: Normal rate, regular rhythm and normal heart sounds. Exam reveals no gallop and no friction rub.   No murmur heard.  Pulmonary/Chest: Effort normal and breath sounds normal. No respiratory distress. He has no wheezes. He has no rales. He exhibits no tenderness.   Abdominal: Soft. Bowel sounds are normal. He exhibits no distension. There is no hepatosplenomegaly. There is no tenderness. There is no rigidity, no rebound and no guarding.   Musculoskeletal: Normal range of motion. He exhibits no edema, tenderness or deformity.   Neurological: He is alert and oriented to person, place, and time. He has normal reflexes.   Skin: Skin is warm and dry. No rash noted. No pallor.   Psychiatric: He has a normal mood and affect. His behavior is normal. Judgment and thought content normal.       Assessment/Plan   Merlin was seen today for endoscopy.    Diagnoses and all orders for this visit:    Fleming's esophagus without dysplasia  -     Case Request; Standing  -     Follow Anesthesia Guidelines / Standing Orders; Future  -     Obtain Informed Consent; Future  -     Implement Anesthesia Orders Day of Procedure; Standing  -     Obtain Informed Consent; Standing  -     Case Request    Gastroesophageal reflux " disease with esophagitis    Nonsmoker    Obesity, unspecified obesity severity, unspecified obesity type    HTN (hypertension), benign  Comments:  cont bp medication the day of procedure     I discussed non pharmaceutical treatment of gerd.  This includes gradually losing weight to achieve ideal body wt., elevation of the head of bed by 4-6 inches, nothing to eat or drink 3 hours prior to lying down, avoiding tight clothing, stress reduction, tobacco cessation, reduction of alcohol intake, and dietary restrictions (avoiding caffeine, coffee, fatty foods, mints, chocolate, spicy foods and tomato based sauces as much as possible).      ESOPHAGOGASTRODUODENOSCOPY WITH ANESTHESIA (N/A)  Patient's Body mass index is 29.03 kg/m². BMI is above normal parameters. Recommendations include: nutrition counseling.      MARIAN Cadena  6/19/2019  10:28 AM      IF YOU SMOKE OR USE TOBACCO PLEASE READ THE FOLLOWING:   3.5 minutes provided    Why is smoking bad for me?  Smoking increases the risk of heart disease, lung disease, vascular disease, stroke, and cancer.     If you smoke, STOP!    If you would like more information on quitting smoking, please visit the [employer] website: [email or web address]   This link will provide additional resources including the QUIT line and the Beat the Pack support groups.     For more information:    Quit Now [place]  1-800-QUIT-NOW  https://[email or web address]/en-US/    Obesity, Adult  Obesity is having too much body fat. If you have a BMI of 30 or more, you are obese. BMI is a number that explains how much body fat you have. Obesity is often caused by taking in (consuming) more calories than your body uses.  Obesity can cause serious health problems. Changing your lifestyle can help to treat obesity.  Follow these instructions at home:  Eating and drinking     · Follow advice from your doctor about what to eat and drink. Your  doctor may tell you to:  ¨ Cut down on (limit) fast foods, sweets, and processed snack foods.  ¨ Choose low-fat options. For example, choose low-fat milk instead of whole milk.  ¨ Eat 5 or more servings of fruits or vegetables every day.  ¨ Eat at home more often. This gives you more control over what you eat.  ¨ Choose healthy foods when you eat out.  ¨ Learn what a healthy portion size is. A portion size is the amount of a certain food that is healthy for you to eat at one time. This is different for each person.  ¨ Keep low-fat snacks available.  ¨ Avoid sugary drinks. These include soda, fruit juice, iced tea that is sweetened with sugar, and flavored milk.  ¨ Eat a healthy breakfast.  · Drink enough water to keep your pee (urine) clear or pale yellow.  · Do not go without eating for long periods of time (do not fast).  · Do not go on popular or trendy diets (fad diets).  Physical Activity   · Exercise often, as told by your doctor. Ask your doctor:  ¨ What types of exercise are safe for you.  ¨ How often you should exercise.  · Warm up and stretch before being active.  · Do slow stretching after being active (cool down).  · Rest between times of being active.  Lifestyle   · Limit how much time you spend in front of your TV, computer, or video game system (be less sedentary).  · Find ways to reward yourself that do not involve food.  · Limit alcohol intake to no more than 1 drink a day for nonpregnant women and 2 drinks a day for men. One drink equals 12 oz of beer, 5 oz of wine, or 1½ oz of hard liquor.  General instructions   · Keep a weight loss journal. This can help you keep track of:  ¨ The food that you eat.  ¨ The exercise that you do.  · Take over-the-counter and prescription medicines only as told by your doctor.  · Take vitamins and supplements only as told by your doctor.  · Think about joining a support group. Your doctor may be able to help with this.  · Keep all follow-up visits as told by your  doctor. This is important.  Contact a doctor if:  · You cannot meet your weight loss goal after you have changed your diet and lifestyle for 6 weeks.  This information is not intended to replace advice given to you by your health care provider. Make sure you discuss any questions you have with your health care provider.  Document Released: 03/11/2013 Document Revised: 05/25/2017 Document Reviewed: 10/05/2016  Elsevier Interactive Patient Education © 2017 Elsevier Inc.

## 2019-07-24 ENCOUNTER — TELEPHONE (OUTPATIENT)
Dept: CARDIOLOGY | Age: 74
End: 2019-07-24

## 2019-08-08 ENCOUNTER — TELEPHONE (OUTPATIENT)
Dept: CARDIOLOGY | Age: 74
End: 2019-08-08

## 2019-08-13 ENCOUNTER — OFFICE VISIT (OUTPATIENT)
Dept: CARDIOLOGY | Age: 74
End: 2019-08-13
Payer: MEDICARE

## 2019-08-13 VITALS
HEIGHT: 73 IN | SYSTOLIC BLOOD PRESSURE: 128 MMHG | BODY MASS INDEX: 30.35 KG/M2 | HEART RATE: 72 BPM | DIASTOLIC BLOOD PRESSURE: 82 MMHG | WEIGHT: 229 LBS

## 2019-08-13 DIAGNOSIS — Z95.0 PACEMAKER: ICD-10-CM

## 2019-08-13 DIAGNOSIS — I48.0 PAF (PAROXYSMAL ATRIAL FIBRILLATION) (HCC): ICD-10-CM

## 2019-08-13 DIAGNOSIS — R00.1 BRADYCARDIA: ICD-10-CM

## 2019-08-13 PROCEDURE — 1123F ACP DISCUSS/DSCN MKR DOCD: CPT | Performed by: INTERNAL MEDICINE

## 2019-08-13 PROCEDURE — 99213 OFFICE O/P EST LOW 20 MIN: CPT | Performed by: INTERNAL MEDICINE

## 2019-08-13 PROCEDURE — 93280 PM DEVICE PROGR EVAL DUAL: CPT | Performed by: INTERNAL MEDICINE

## 2019-08-13 PROCEDURE — G8427 DOCREV CUR MEDS BY ELIG CLIN: HCPCS | Performed by: INTERNAL MEDICINE

## 2019-08-13 PROCEDURE — 4040F PNEUMOC VAC/ADMIN/RCVD: CPT | Performed by: INTERNAL MEDICINE

## 2019-08-13 PROCEDURE — G8417 CALC BMI ABV UP PARAM F/U: HCPCS | Performed by: INTERNAL MEDICINE

## 2019-08-13 PROCEDURE — 1036F TOBACCO NON-USER: CPT | Performed by: INTERNAL MEDICINE

## 2019-08-13 NOTE — PROGRESS NOTES
53-year-old gentleman with history of dyslipidemia, hypertension, and bradycardia/pacemaker returns for routine follow-up. Pacemaker implanted in the remote past with a generator change, post in September 2013. More recently in April of this year he had a 21 hour episode of atrial fibrillation which was asymptomatic. He has had none since. On review of systems he denies chest discomfort, change in exercise tolerance, significant and significant dyspnea on exertion. He exercises on a regular basis. On exam he carries 229 pounds on a 6 foot 1 inch frame. Pressure is 128/82 with pulse of 72. Nagi complected very pleasant gentleman. EOMs full, sclerae and conjunctiva normal.  Thyroid normal to palpation. No elevation sensitivity his pressure 45°. Carotid upstrokes normal without delay or bruit. Chest clear to auscultation. S1 and S2 normal without murmurs or gallops. Pacemaker pocket well-healed. No abdominal bruits and no significant lower extremity edema. Oriented ×3 and conversation reflects normal cognition. Irrigation since last visit reveals no atrial fibrillation or failure to capture. Assessment/plan:  1. Paroxysmal atrial fibrillation - advised him to check his pulse daily and contact us should he notice that it became irregularly irregular. If he were to have significant episodes of recurrent A. fib 1 institute anticoagulation. 2.  Pacemaker - functioning normally  3. Dyslipidemia - monitored and managed by Dr. Davia Felty  4. Hypertension - well controlled. Medical records reviewed prior to clinic visit. 15 minutes spent face-to-face with Mr. Mccullough Oskar Herbert

## 2019-08-13 NOTE — PATIENT INSTRUCTIONS
Your next [facility] home pacemaker check is scheduled for 09/13/19 and 10/14/19. You may receive a survey regarding the care you received during your visit. Your input is valuable to us. We encourage you to complete and return your survey. We hope you will choose us in the future for your healthcare needs.

## 2019-09-13 DIAGNOSIS — Z95.0 PACEMAKER: Primary | ICD-10-CM

## 2019-09-13 DIAGNOSIS — I48.0 PAF (PAROXYSMAL ATRIAL FIBRILLATION) (HCC): ICD-10-CM

## 2019-09-13 DIAGNOSIS — R00.1 BRADYCARDIA: ICD-10-CM

## 2019-09-23 DIAGNOSIS — N40.0 BENIGN PROSTATIC HYPERPLASIA, UNSPECIFIED WHETHER LOWER URINARY TRACT SYMPTOMS PRESENT: ICD-10-CM

## 2019-09-23 LAB — PROSTATE SPECIFIC ANTIGEN: 3.92 NG/ML (ref 0–4)

## 2019-09-30 ENCOUNTER — OFFICE VISIT (OUTPATIENT)
Dept: UROLOGY | Age: 74
End: 2019-09-30
Payer: MEDICARE

## 2019-09-30 VITALS — HEIGHT: 73 IN | BODY MASS INDEX: 31.01 KG/M2 | TEMPERATURE: 97.1 F | WEIGHT: 234 LBS

## 2019-09-30 DIAGNOSIS — N52.9 ERECTILE DYSFUNCTION OF ORGANIC ORIGIN: ICD-10-CM

## 2019-09-30 DIAGNOSIS — N40.0 BENIGN PROSTATIC HYPERPLASIA, UNSPECIFIED WHETHER LOWER URINARY TRACT SYMPTOMS PRESENT: Primary | ICD-10-CM

## 2019-09-30 LAB
APPEARANCE FLUID: NORMAL
BILIRUBIN, POC: 0
BLOOD URINE, POC: NORMAL
CLARITY, POC: CLEAR
COLOR, POC: YELLOW
GLUCOSE URINE, POC: NORMAL
KETONES, POC: NORMAL
LEUKOCYTE EST, POC: NORMAL
NITRITE, POC: NORMAL
PH, POC: 5.5
PROTEIN, POC: NORMAL
SPECIFIC GRAVITY, POC: 1.02
UROBILINOGEN, POC: 0.2

## 2019-09-30 PROCEDURE — 99214 OFFICE O/P EST MOD 30 MIN: CPT | Performed by: PHYSICIAN ASSISTANT

## 2019-09-30 PROCEDURE — 1036F TOBACCO NON-USER: CPT | Performed by: PHYSICIAN ASSISTANT

## 2019-09-30 PROCEDURE — 81002 URINALYSIS NONAUTO W/O SCOPE: CPT | Performed by: PHYSICIAN ASSISTANT

## 2019-09-30 PROCEDURE — G8427 DOCREV CUR MEDS BY ELIG CLIN: HCPCS | Performed by: PHYSICIAN ASSISTANT

## 2019-09-30 PROCEDURE — G8417 CALC BMI ABV UP PARAM F/U: HCPCS | Performed by: PHYSICIAN ASSISTANT

## 2019-09-30 PROCEDURE — 1123F ACP DISCUSS/DSCN MKR DOCD: CPT | Performed by: PHYSICIAN ASSISTANT

## 2019-09-30 PROCEDURE — 4040F PNEUMOC VAC/ADMIN/RCVD: CPT | Performed by: PHYSICIAN ASSISTANT

## 2019-09-30 RX ORDER — TADALAFIL 20 MG/1
20 TABLET ORAL PRN
Qty: 2 TABLET | Refills: 1 | Status: SHIPPED | OUTPATIENT
Start: 2019-09-30 | End: 2021-08-24

## 2019-09-30 ASSESSMENT — ENCOUNTER SYMPTOMS
BACK PAIN: 0
ABDOMINAL PAIN: 0
WHEEZING: 0
EYE PAIN: 0
SINUS PAIN: 0
SHORTNESS OF BREATH: 0
VOMITING: 0

## 2019-10-02 ENCOUNTER — HOSPITAL ENCOUNTER (OUTPATIENT)
Facility: HOSPITAL | Age: 74
Setting detail: HOSPITAL OUTPATIENT SURGERY
Discharge: HOME | End: 2019-10-02
Attending: INTERNAL MEDICINE | Admitting: INTERNAL MEDICINE

## 2019-10-02 ENCOUNTER — ANESTHESIA (OUTPATIENT)
Dept: GASTROENTEROLOGY | Facility: HOSPITAL | Age: 74
End: 2019-10-02

## 2019-10-02 ENCOUNTER — ANESTHESIA EVENT (OUTPATIENT)
Dept: GASTROENTEROLOGY | Facility: HOSPITAL | Age: 74
End: 2019-10-02

## 2019-10-02 VITALS
WEIGHT: 231 LBS | HEART RATE: 72 BPM | BODY MASS INDEX: 30.62 KG/M2 | TEMPERATURE: 97 F | SYSTOLIC BLOOD PRESSURE: 147 MMHG | RESPIRATION RATE: 17 BRPM | OXYGEN SATURATION: 94 % | HEIGHT: 73 IN | DIASTOLIC BLOOD PRESSURE: 92 MMHG

## 2019-10-02 DIAGNOSIS — K22.70 BARRETT'S ESOPHAGUS WITHOUT DYSPLASIA: ICD-10-CM

## 2019-10-02 PROCEDURE — 88305 TISSUE EXAM BY PATHOLOGIST: CPT | Performed by: INTERNAL MEDICINE

## 2019-10-02 PROCEDURE — 43239 EGD BIOPSY SINGLE/MULTIPLE: CPT | Performed by: INTERNAL MEDICINE

## 2019-10-02 PROCEDURE — 25010000002 PROPOFOL 10 MG/ML EMULSION: Performed by: NURSE ANESTHETIST, CERTIFIED REGISTERED

## 2019-10-02 RX ORDER — PROPOFOL 10 MG/ML
VIAL (ML) INTRAVENOUS AS NEEDED
Status: DISCONTINUED | OUTPATIENT
Start: 2019-10-02 | End: 2019-10-02 | Stop reason: SURG

## 2019-10-02 RX ORDER — SODIUM CHLORIDE 9 MG/ML
100 INJECTION, SOLUTION INTRAVENOUS CONTINUOUS
Status: CANCELLED | OUTPATIENT
Start: 2019-10-02

## 2019-10-02 RX ORDER — SODIUM CHLORIDE 9 MG/ML
500 INJECTION, SOLUTION INTRAVENOUS CONTINUOUS PRN
Status: DISCONTINUED | OUTPATIENT
Start: 2019-10-02 | End: 2019-10-02 | Stop reason: HOSPADM

## 2019-10-02 RX ORDER — SODIUM CHLORIDE 0.9 % (FLUSH) 0.9 %
3-10 SYRINGE (ML) INJECTION AS NEEDED
Status: CANCELLED | OUTPATIENT
Start: 2019-10-02

## 2019-10-02 RX ORDER — SODIUM CHLORIDE 0.9 % (FLUSH) 0.9 %
3 SYRINGE (ML) INJECTION EVERY 12 HOURS SCHEDULED
Status: CANCELLED | OUTPATIENT
Start: 2019-10-02

## 2019-10-02 RX ORDER — SODIUM CHLORIDE 0.9 % (FLUSH) 0.9 %
10 SYRINGE (ML) INJECTION AS NEEDED
Status: DISCONTINUED | OUTPATIENT
Start: 2019-10-02 | End: 2019-10-02 | Stop reason: HOSPADM

## 2019-10-02 RX ADMIN — LIDOCAINE HYDROCHLORIDE 150 MG: 20 INJECTION, SOLUTION INTRAVENOUS at 09:27

## 2019-10-02 RX ADMIN — SODIUM CHLORIDE 500 ML: 9 INJECTION, SOLUTION INTRAVENOUS at 08:10

## 2019-10-02 RX ADMIN — PROPOFOL 150 MG: 10 INJECTION, EMULSION INTRAVENOUS at 09:27

## 2019-10-02 NOTE — ANESTHESIA POSTPROCEDURE EVALUATION
"Patient: Merlin William Ihnen    Procedure Summary     Date:  10/02/19 Room / Location:  [facility] ENDOSCOPY 5 / [facility] ENDOSCOPY    Anesthesia Start:  0925 Anesthesia Stop:  0933    Procedure:  ESOPHAGOGASTRODUODENOSCOPY WITH ANESTHESIA (N/A ) Diagnosis:       Fleming's esophagus without dysplasia      (Fleming's esophagus without dysplasia [K22.70])    Surgeon:  Keyon Ann MD Provider:  Flash Espana CRNA    Anesthesia Type:  MAC ASA Status:  3          Anesthesia Type: MAC  Last vitals  BP   155/77 (10/02/19 0747)   Temp   97 °F (36.1 °C) (10/02/19 0747)   Pulse   78 (10/02/19 0747)   Resp   18 (10/02/19 0747)     SpO2   97 % (10/02/19 0747)     Post Anesthesia Care and Evaluation    Patient location during evaluation: PACU  Patient participation: complete - patient participated  Level of consciousness: awake and alert  Pain management: adequate  Airway patency: patent  Anesthetic complications: No anesthetic complications    Cardiovascular status: acceptable  Respiratory status: acceptable  Hydration status: acceptable    Comments: Blood pressure 155/77, pulse 78, temperature 97 °F (36.1 °C), temperature source Temporal, resp. rate 18, height 185.4 cm (73\"), weight 105 kg (231 lb), SpO2 97 %.    Pt discharged from PACU based on tenzin score >8      "

## 2019-10-02 NOTE — H&P
[facility] Gastroenterology  Pre Procedure History & Physical    Chief Complaint:   Fleming's    Subjective     HPI:   Here for evaluation of Fleming's esophagus    Past Medical History:   Past Medical History:   Diagnosis Date   • Cancer (CMS/HCC)    • Gastrointestinal stromal tumor (GIST) (CMS/HCC)    • GERD (gastroesophageal reflux disease)    • Liver cirrhosis (CMS/HCC)    • Pacemaker    • Squamous cell skin cancer        Past Surgical History:  Past Surgical History:   Procedure Laterality Date   • COLON SURGERY     • COLONOSCOPY W/ POLYPECTOMY  07/19/2016    Polyp at 70 cm proximal to anus no changes of adenomatous or hyperplastic polyp identified repeat exam in 5 years   • COLONOSCOPY W/ POLYPECTOMY  04/19/2013    Early Tubular adenoma repeat exam in 3 years   • ENDOSCOPY  07/19/2016    Chronic esophagogastritis mild with Intestinal Metaplasia repeat exam in 3 years   • ENDOSCOPY  04/19/2013    Mild chronic inflammation negative for Intestinal Metaplasia    • NECK SURGERY     • PACEMAKER IMPLANTATION     • SQUAMOUS CELL CARCINOMA EXCISION         Family History:  Family History   Problem Relation Age of Onset   • Colon polyps Neg Hx    • Colon cancer Neg Hx        Social History:   reports that he has quit smoking. His smoking use included cigarettes. He has a 5.00 pack-year smoking history. He has never used smokeless tobacco. He reports that he does not drink alcohol or use drugs.    Medications:   Prior to Admission medications    Medication Sig Start Date End Date Taking? Authorizing Provider   cetirizine (zyrTEC) 10 MG tablet Take  by mouth.   Yes Emergency, Nurse Rachel RN   Cholecalciferol (VITAMIN D3) 2000 UNITS capsule Take  by mouth.   Yes Emergency, Nurse Rachel RN   lisinopril (PRINIVIL,ZESTRIL) 10 MG tablet Take 10 mg by mouth Daily.   Yes Provider, MD Ángel   lovastatin (MEVACOR) 20 MG tablet Take  by mouth.   Yes Emergency, Nurse Rachel RN   omeprazole (priLOSEC) 40 MG capsule Take 1  "capsule by mouth Daily. 10/16/18  Yes Mame Wraner APRN   therapeutic multivitamin-minerals (THERAGRAN-M) tablet Take  by mouth.   Yes Emergency, Nurse Epic, RN   aspirin 81 MG EC tablet Take 81 mg by mouth Daily. Three times a week    Provider, MD Ángel   Avanafil 200 MG tablet Take  by mouth.    Provider, MD Ángel   Avanafil 200 MG tablet Take  by mouth. 7/24/17 10/2/19  Emergency, Nurse Rachel RN       Allergies:  Amoxicillin and Penicillins    Objective     Blood pressure 147/92, pulse 72, temperature 97 °F (36.1 °C), temperature source Temporal, resp. rate 17, height 185.4 cm (73\"), weight 105 kg (231 lb), SpO2 94 %.    Physical Exam   Constitutional: Pt is oriented to person, place, and in no distress.   HENT: Mouth/Throat: Oropharynx is clear.   Cardiovascular: Normal rate, regular rhythm.    Pulmonary/Chest: Effort normal. No respiratory distress. No  wheezes.   Abdominal: Soft. Non-distended.  Skin: Skin is warm and dry.   Psychiatric: Mood, memory, affect and judgment appear normal.     Assessment/Plan     Diagnosis:  Fleming's esophagus    Anticipated Surgical Procedure:    Proceed with endoscopy    The following major R/B/A were discussed with the patient, however the list is not all inclusive . Risk:  Bleeding (immediate and delayed), perforation (rupture or tear), reaction to medication, missed lesion/cancer, pain during the procedure, infection, need for surgery, need for ostomy, need for mechanical ventilation (breathing machine), death.  Benefits: removal of polyp/tissue, burn/clip/or inject to stop bleeding, removal of foreign body, dilate any stricture.  Alternatives: Xray or CT, surgery, do nothing with associated risk   The patient was given time to ask question and received explanation, and agrees to proceed as per History and Physical.   No guarantee given or expressed.    EMR Dragon/transcription disclaimer: Much of this encounter note is an electronic " transcription/translation of spoken language to printed text.  The electronic translation of spoken language may permit erroneous, or at times, nonsensical words or phrases to be inadvertently transcribed.  Although I have reviewed the note for such errors, some may still exist.    Keyon Ann MD  11:06 AM  10/2/2019

## 2019-10-02 NOTE — ANESTHESIA PREPROCEDURE EVALUATION
Anesthesia Evaluation     no history of anesthetic complications:  NPO Solid Status: > 8 hours  NPO Liquid Status: > 8 hours           Airway   Mallampati: II  TM distance: >3 FB  Neck ROM: full  Dental - normal exam         Pulmonary - normal exam   (-) asthma, recent URI, sleep apnea, not a smoker  Cardiovascular - normal exam  Exercise tolerance: good (4-7 METS)    Rhythm: regular  Rate: normal    (+) pacemaker ([employer]) pacemaker interrogated 1-3 months ago, hypertension,   (-) past MI, angina, cardiac stents, CABG      Neuro/Psych  (-) seizures, TIA, CVA  GI/Hepatic/Renal/Endo    (+) obesity,  GERD, PUD,  liver disease history of elevated LFT,   (-) no renal disease, diabetes, hypothyroidism, hyperthyroidism    Musculoskeletal     Abdominal    Substance History      OB/GYN          Other                        Anesthesia Plan    ASA 3     MAC     intravenous induction   Anesthetic plan, all risks, benefits, and alternatives have been provided, discussed and informed consent has been obtained with: patient.

## 2019-10-03 LAB
CYTO UR: NORMAL
LAB AP CASE REPORT: NORMAL
PATH REPORT.FINAL DX SPEC: NORMAL
PATH REPORT.GROSS SPEC: NORMAL

## 2019-10-14 DIAGNOSIS — R00.1 BRADYCARDIA: ICD-10-CM

## 2019-10-14 DIAGNOSIS — Z95.0 PACEMAKER: Primary | ICD-10-CM

## 2019-10-14 PROCEDURE — 93296 REM INTERROG EVL PM/IDS: CPT | Performed by: NURSE PRACTITIONER

## 2019-10-14 PROCEDURE — 93294 REM INTERROG EVL PM/LDLS PM: CPT | Performed by: NURSE PRACTITIONER

## 2019-11-13 DIAGNOSIS — Z95.0 PACEMAKER: Primary | ICD-10-CM

## 2019-11-13 DIAGNOSIS — R00.1 BRADYCARDIA: ICD-10-CM

## 2019-11-13 PROCEDURE — 99999 PR OFFICE/OUTPT VISIT,PROCEDURE ONLY: CPT | Performed by: CLINICAL NURSE SPECIALIST

## 2019-11-20 DIAGNOSIS — K21.9 GASTROESOPHAGEAL REFLUX DISEASE, ESOPHAGITIS PRESENCE NOT SPECIFIED: ICD-10-CM

## 2019-11-20 RX ORDER — OMEPRAZOLE 40 MG/1
CAPSULE, DELAYED RELEASE ORAL
Qty: 90 CAPSULE | Refills: 4 | Status: SHIPPED | OUTPATIENT
Start: 2019-11-20 | End: 2019-11-25 | Stop reason: SDUPTHER

## 2019-11-25 DIAGNOSIS — K21.9 GASTROESOPHAGEAL REFLUX DISEASE, ESOPHAGITIS PRESENCE NOT SPECIFIED: ICD-10-CM

## 2019-11-26 RX ORDER — OMEPRAZOLE 40 MG/1
40 CAPSULE, DELAYED RELEASE ORAL DAILY
Qty: 90 CAPSULE | Refills: 2 | Status: SHIPPED | OUTPATIENT
Start: 2019-11-26 | End: 2020-08-10

## 2020-02-18 ENCOUNTER — OFFICE VISIT (OUTPATIENT)
Dept: CARDIOLOGY | Age: 75
End: 2020-02-18
Payer: MEDICARE

## 2020-02-18 VITALS
BODY MASS INDEX: 31.01 KG/M2 | WEIGHT: 234 LBS | SYSTOLIC BLOOD PRESSURE: 134 MMHG | DIASTOLIC BLOOD PRESSURE: 64 MMHG | HEIGHT: 73 IN | HEART RATE: 76 BPM

## 2020-02-18 PROCEDURE — 1123F ACP DISCUSS/DSCN MKR DOCD: CPT | Performed by: CLINICAL NURSE SPECIALIST

## 2020-02-18 PROCEDURE — G8417 CALC BMI ABV UP PARAM F/U: HCPCS | Performed by: CLINICAL NURSE SPECIALIST

## 2020-02-18 PROCEDURE — G8484 FLU IMMUNIZE NO ADMIN: HCPCS | Performed by: CLINICAL NURSE SPECIALIST

## 2020-02-18 PROCEDURE — 99213 OFFICE O/P EST LOW 20 MIN: CPT | Performed by: CLINICAL NURSE SPECIALIST

## 2020-02-18 PROCEDURE — 1036F TOBACCO NON-USER: CPT | Performed by: CLINICAL NURSE SPECIALIST

## 2020-02-18 PROCEDURE — 4040F PNEUMOC VAC/ADMIN/RCVD: CPT | Performed by: CLINICAL NURSE SPECIALIST

## 2020-02-18 PROCEDURE — 93280 PM DEVICE PROGR EVAL DUAL: CPT | Performed by: CLINICAL NURSE SPECIALIST

## 2020-02-18 PROCEDURE — G8427 DOCREV CUR MEDS BY ELIG CLIN: HCPCS | Performed by: CLINICAL NURSE SPECIALIST

## 2020-02-18 NOTE — PATIENT INSTRUCTIONS
Send [employer] home pacemaker reading on 5-20-20  Follow up in 6 mos  With Dr. Moises Ziegler   Call with any questions or concerns  Follow up with Val Wade MD for non cardiac problems  Report any new problems  Cardiovascular Fitness-Exercise as tolerated. Strive for 30 minutes of exercise most days of the week. Cardiac / Healthy Diet  Continue current medications as directed  Continue plan of treatment  It is always recommended that you bring your medications bottles with you to each visit - this is for your safety!

## 2020-02-18 NOTE — PROGRESS NOTES
[address] Renata Monge, [address] [facility] [address] [address] [address]  Phone: [phone]  Fax: [phone]    OFFICE VISIT:  2020    Katarzyna Almeida - : 1945    Reason For Visit:  Xavier Brantley is a 66 y.o. male who is here for 6 Month Follow-Up (  no cardiac symptoms) and Bradycardia (pacemaker check)  History of bradycardia requiring pacemaker implanted in  with generator change . Returns today for routine follow-up. He states he is active and doing well    Subjective  Merlin denies exertional chest pain, shortness of breath, orthopnea, paroxysmal nocturnal dyspnea, syncope, presyncope, arrhythmia, edema and fatigue. The patient denies numbness or weakness to suggest cerebrovascular accident or transient ischemic attack. Rosita Bae MD is PCP and follows labs.   Katarzyna Almeida has the following history as recorded in [facility]:    Patient Active Problem List    Diagnosis Date Noted    BPH (benign prostatic hyperplasia) 06/15/2016    Erectile dysfunction of organic origin 06/15/2016    FHx: prostate cancer 06/15/2016    Peptic ulcer disease     Pacemaker     Bradycardia      Past Medical History:   Diagnosis Date    BCC (basal cell carcinoma of skin)     BPH (benign prostatic hyperplasia) 6/15/2016    Bradycardia     Dysmetabolic syndrome     Elevated PSA     Fatty liver     Gout     Hypercholesteremia     Dr. Edmond Monzon manages    Hyperlipidemia     Hypertension     Pacemaker 13  MDL    generator replacement    Peptic ulcer disease     Stomach cancer ([address]      Past Surgical History:   Procedure Laterality Date    CERVICAL SPINE SURGERY      COLONOSCOPY      CYST REMOVAL      GASTRECTOMY      HEMICOLECTOMY      PACEMAKER INSERTION  13    generator change    UPPER GASTROINTESTINAL ENDOSCOPY       Family History   Problem Relation Age of Onset    Heart Disease Mother     Prostate Cancer Brother      Social History     Tobacco Use    Smoking status: Former incision. Neurologic - no speech difficulty, facial asymmetry or lateralizing weakness. No seizures, presyncope, syncope, or significant dizziness. Hematologic - no easy bruising or excessive bleeding. Psychiatric - no severe anxiety or insomnia. No confusion. All other review of systems are negative. Objective  Vital Signs - /64   Pulse 76   Ht 6' 1\" (1.854 m)   Wt 234 lb (106.1 kg)   BMI 30.87 kg/m²   General - Merlin is alert, cooperative, and pleasant. Well groomed. No acute distress. Body habitus is overweight. HEENT - The head is normocephalic. No circumoral cyanosis. Dentition is normal.   EYES -  No Xanthelasma, no arcus senilis, no conjunctival hemorrhages or discharge. Neck - Supple, without increased jugular venous pressures. No carotid bruits. No mass. Respiratory - Lungs are clear bilaterally. No wheezes or rales. Normal effort without use of accessory muscles. Cardiovascular - Heart has regular rhythm and rate. No murmurs, rubs or gallops. + pedal pulses and no varicosities. Abdominal -  Soft, nontender, nondistended. Bowel sounds are intact. Extremities - No clubbing, cyanosis, or  edema. Musculoskeletal -  No clubbing . No Osler's nodes. Gait normal .  No kyphosis or scoliosis. Skin -  no statis ulcers or dermatitis. Neurological - No focal signs are identified. Oriented to person, place and time. Psychiatric -  Appropriate affect and mood. Assessment:     Diagnosis Orders   1. Pacemaker     2. Bradycardia     3.  PAF (paroxysmal atrial fibrillation) ([facility]       Data:  BP Readings from Last 3 Encounters:   02/18/20 134/64   08/13/19 128/82   09/26/18 135/86    Pulse Readings from Last 3 Encounters:   02/18/20 76   08/13/19 72   09/26/18 79        Wt Readings from Last 3 Encounters:   02/18/20 234 lb (106.1 kg)   09/30/19 234 lb (106.1 kg)   08/13/19 229 lb (103.9 kg)     Pacemaker check showed adequate battery status- approximately 5.5 years   and  appropriate diagnostics without any sustained arrhythmias. Mode AAIR-DDDR at 60  AS-VS 69%  AP- VS 31%  Next check in 3 months via [employer] home monitoring system      Blood pressure and heart rate controlled. Medical manage includes ACE inhibitor. Also on statin and aspirin  States taking medications as prescribed  Stable cardiovascular status. No evidence of overt heart failure, angina or dysrhythmia. Plan  Send [employer] home pacemaker reading on 5-20-20  Follow up in 6 mos  With Dr. Femi Santizo   Call with any questions or concerns  Follow up with Shravan Vazquez MD for non cardiac problems  Report any new problems  Cardiovascular Fitness-Exercise as tolerated. Strive for 30 minutes of exercise most days of the week. Cardiac / Healthy Diet  Continue current medications as directed  Continue plan of treatment  It is always recommended that you bring your medications bottles with you to each visit - this is for your safety! CHERYL Vu    EMR dragon/transcription disclaimer: Much of this encounter note is electronic transcription/translation of spoken language to printed tach. Electronic translation of spoken language may be erroneous, or at times, nonsensical words or phrases may be inadvertently transcribed.  Although, I have reviewed the note for such errors, some may still exist.

## 2020-05-15 DIAGNOSIS — Z79.899 ENCOUNTER FOR LONG-TERM (CURRENT) USE OF OTHER MEDICATIONS: ICD-10-CM

## 2020-05-15 DIAGNOSIS — R73.01 IMPAIRED FASTING GLUCOSE: ICD-10-CM

## 2020-05-15 DIAGNOSIS — E78.5 HYPERLIPIDEMIA, UNSPECIFIED HYPERLIPIDEMIA TYPE: ICD-10-CM

## 2020-05-15 DIAGNOSIS — I10 ESSENTIAL HYPERTENSION, MALIGNANT: Primary | ICD-10-CM

## 2020-05-15 DIAGNOSIS — Z12.5 SPECIAL SCREENING FOR MALIGNANT NEOPLASM OF PROSTATE: ICD-10-CM

## 2020-05-15 DIAGNOSIS — I10 ESSENTIAL HYPERTENSION, MALIGNANT: ICD-10-CM

## 2020-05-16 LAB
ALBUMIN SERPL-MCNC: 4.5 G/DL (ref 3.5–5.2)
ALBUMIN/GLOB SERPL: 1.5 G/DL
ALP SERPL-CCNC: 46 U/L (ref 39–117)
ALT SERPL-CCNC: 28 U/L (ref 1–41)
APPEARANCE UR: CLEAR
AST SERPL-CCNC: 57 U/L (ref 1–40)
BASOPHILS # BLD AUTO: 0.03 10*3/MM3 (ref 0–0.2)
BASOPHILS NFR BLD AUTO: 0.6 % (ref 0–1.5)
BILIRUB SERPL-MCNC: 0.6 MG/DL (ref 0.2–1.2)
BILIRUB UR QL STRIP: NEGATIVE
BUN SERPL-MCNC: 12 MG/DL (ref 8–23)
BUN/CREAT SERPL: 11.9 (ref 7–25)
CALCIUM SERPL-MCNC: 9.8 MG/DL (ref 8.6–10.5)
CHLORIDE SERPL-SCNC: 96 MMOL/L (ref 98–107)
CHOLEST SERPL-MCNC: 180 MG/DL (ref 0–200)
CO2 SERPL-SCNC: 26.3 MMOL/L (ref 22–29)
COLOR UR: YELLOW
CREAT SERPL-MCNC: 1.01 MG/DL (ref 0.76–1.27)
EOSINOPHIL # BLD AUTO: 0.35 10*3/MM3 (ref 0–0.4)
EOSINOPHIL NFR BLD AUTO: 6.5 % (ref 0.3–6.2)
ERYTHROCYTE [DISTWIDTH] IN BLOOD BY AUTOMATED COUNT: 12.3 % (ref 12.3–15.4)
GLOBULIN SER CALC-MCNC: 3.1 GM/DL
GLUCOSE SERPL-MCNC: 106 MG/DL (ref 65–99)
GLUCOSE UR QL: NEGATIVE
HBA1C MFR BLD: 5.3 % (ref 4.8–5.6)
HCT VFR BLD AUTO: 41.5 % (ref 37.5–51)
HDLC SERPL-MCNC: 44 MG/DL (ref 40–60)
HGB BLD-MCNC: 14.1 G/DL (ref 13–17.7)
HGB UR QL STRIP: NEGATIVE
IMM GRANULOCYTES # BLD AUTO: 0 10*3/MM3 (ref 0–0.05)
IMM GRANULOCYTES NFR BLD AUTO: 0 % (ref 0–0.5)
KETONES UR QL STRIP: NEGATIVE
LDLC SERPL CALC-MCNC: 112 MG/DL (ref 0–100)
LEUKOCYTE ESTERASE UR QL STRIP: NEGATIVE
LYMPHOCYTES # BLD AUTO: 1.5 10*3/MM3 (ref 0.7–3.1)
LYMPHOCYTES NFR BLD AUTO: 27.9 % (ref 19.6–45.3)
MCH RBC QN AUTO: 32.6 PG (ref 26.6–33)
MCHC RBC AUTO-ENTMCNC: 34 G/DL (ref 31.5–35.7)
MCV RBC AUTO: 96.1 FL (ref 79–97)
MONOCYTES # BLD AUTO: 0.4 10*3/MM3 (ref 0.1–0.9)
MONOCYTES NFR BLD AUTO: 7.4 % (ref 5–12)
NEUTROPHILS # BLD AUTO: 3.1 10*3/MM3 (ref 1.7–7)
NEUTROPHILS NFR BLD AUTO: 57.6 % (ref 42.7–76)
NITRITE UR QL STRIP: NEGATIVE
NRBC BLD AUTO-RTO: 0 /100 WBC (ref 0–0.2)
PH UR STRIP: 7 [PH] (ref 5–8)
PLATELET # BLD AUTO: 191 10*3/MM3 (ref 140–450)
POTASSIUM SERPL-SCNC: 4.8 MMOL/L (ref 3.5–5.2)
PROT SERPL-MCNC: 7.6 G/DL (ref 6–8.5)
PROT UR QL STRIP: NEGATIVE
PSA SERPL-MCNC: 2.79 NG/ML (ref 0–4)
RBC # BLD AUTO: 4.32 10*6/MM3 (ref 4.14–5.8)
SODIUM SERPL-SCNC: 133 MMOL/L (ref 136–145)
SP GR UR: 1.01 (ref 1–1.03)
TRIGL SERPL-MCNC: 121 MG/DL (ref 0–150)
TSH SERPL DL<=0.005 MIU/L-ACNC: 3.38 UIU/ML (ref 0.27–4.2)
URATE SERPL-MCNC: 7.1 MG/DL (ref 3.4–7)
UROBILINOGEN UR STRIP-MCNC: NORMAL MG/DL
VLDLC SERPL CALC-MCNC: 24.2 MG/DL
WBC # BLD AUTO: 5.38 10*3/MM3 (ref 3.4–10.8)

## 2020-05-20 PROCEDURE — 93296 REM INTERROG EVL PM/IDS: CPT | Performed by: CLINICAL NURSE SPECIALIST

## 2020-05-20 PROCEDURE — 93294 REM INTERROG EVL PM/LDLS PM: CPT | Performed by: CLINICAL NURSE SPECIALIST

## 2020-05-21 ENCOUNTER — OFFICE VISIT (OUTPATIENT)
Dept: INTERNAL MEDICINE | Facility: CLINIC | Age: 75
End: 2020-05-21

## 2020-05-21 VITALS
TEMPERATURE: 97.8 F | BODY MASS INDEX: 29.95 KG/M2 | DIASTOLIC BLOOD PRESSURE: 84 MMHG | SYSTOLIC BLOOD PRESSURE: 124 MMHG | HEIGHT: 73 IN | WEIGHT: 226 LBS | HEART RATE: 67 BPM | OXYGEN SATURATION: 98 %

## 2020-05-21 DIAGNOSIS — Z00.00 MEDICARE ANNUAL WELLNESS VISIT, SUBSEQUENT: ICD-10-CM

## 2020-05-21 DIAGNOSIS — I10 HTN (HYPERTENSION), BENIGN: ICD-10-CM

## 2020-05-21 DIAGNOSIS — Z23 NEED FOR TDAP VACCINATION: Primary | ICD-10-CM

## 2020-05-21 PROCEDURE — 96372 THER/PROPH/DIAG INJ SC/IM: CPT | Performed by: NURSE PRACTITIONER

## 2020-05-21 PROCEDURE — G0439 PPPS, SUBSEQ VISIT: HCPCS | Performed by: NURSE PRACTITIONER

## 2020-05-21 PROCEDURE — 90715 TDAP VACCINE 7 YRS/> IM: CPT | Performed by: NURSE PRACTITIONER

## 2020-05-21 NOTE — PROGRESS NOTES
The ABCs of the Annual Wellness Visit  Subsequent Medicare Wellness Visit    Chief Complaint   Patient presents with   • Medicare Wellness-subsequent   • Skin Lesion     upper back, itches and has been there a couple of months. Discuss tdap       Subjective   History of Present Illness:  Merlin William Ihnen is a 75 y.o. male who presents for a Subsequent Medicare Wellness Visit.  He is up-to-date with majority of his vaccinations, with exception of shingles and tetanus.  He is interested in tetanus vaccination today in the office, however would like to postpone shingles vaccination until next 6-month follow-up.  He had his prostate screening completed in November of last year with his yearly labs and will repeat in November of this year, no abnormalities and patient deferred prostate digital exam.  He is due for DEXA bone scan, however he is not interested in continuing at this time.  His last colonoscopy was July 2016 and would be due again 2026.     He does have 1 minor complaint today.  He reports that his upper back has been itchy for a couple of months now.  Upon exam of his upper and lower back, he does have actinic keratoses, however I did not apprise lesions suspicious of basal cell or melanoma.  He does have a diagnosis of eczema and upper and lower back is reddened and dry.  He admits to taking hot showers and has not been applying lotion on a regular basis.  He reports that his itchiness is mild in severity.  He is followed by [facility] and next appointment is in August.    HEALTH RISK ASSESSMENT    Recent Hospitalizations:  No hospitalization(s) within the last year.    Current Medical Providers:  Patient Care Team:  Adriel Ruvalcaba MD as PCP - General  Adriel Ruvalcaba MD as PCP - Family Medicine  Keyon Ann MD as Consulting Physician (Gastroenterology)    Smoking Status:  Social History     Tobacco Use   Smoking Status Former Smoker   • Packs/day: 0.50   • Years: 10.00   •  Pack years: 5.00   • Types: Cigarettes   Smokeless Tobacco Never Used   Tobacco Comment    quit over 50 yrs ago       Alcohol Consumption:  Social History     Substance and Sexual Activity   Alcohol Use Yes    Comment: 4-5 times weekly       Depression Screen:   PHQ-2/PHQ-9 Depression Screening 5/21/2020   Little interest or pleasure in doing things 0   Feeling down, depressed, or hopeless 0   Total Score 0       Fall Risk Screen:  CARLOS Fall Risk Assessment was completed, and patient is at LOW risk for falls.Assessment completed on:5/21/2020    Health Habits and Functional and Cognitive Screening:  Functional & Cognitive Status 5/21/2020   Do you have difficulty preparing food and eating? No   Do you have difficulty bathing yourself, getting dressed or grooming yourself? No   Do you have difficulty using the toilet? No   Do you have difficulty moving around from place to place? No   Do you have trouble with steps or getting out of a bed or a chair? No   Current Diet Well Balanced Diet   Dental Exam Up to date   Eye Exam Up to date   Exercise (times per week) 3 times per week   Current Exercise Activities Include Walking   Do you need help using the phone?  No   Are you deaf or do you have serious difficulty hearing?  Yes   Do you need help with transportation? No   Do you need help shopping? No   Do you need help preparing meals?  No   Do you need help with housework?  No   Do you need help with laundry? No   Do you need help taking your medications? No   Do you need help managing money? No   Do you ever drive or ride in a car without wearing a seat belt? No   Have you felt unusual stress, anger or loneliness in the last month? No   Who do you live with? Spouse   If you need help, do you have trouble finding someone available to you? Yes   Do you have difficulty concentrating, remembering or making decisions? No         Does the patient have evidence of cognitive impairment? No    Asprin use counseling:Taking ASA  appropriately as indicated    Age-appropriate Screening Schedule:  Refer to the list below for future screening recommendations based on patient's age, sex and/or medical conditions. Orders for these recommended tests are listed in the plan section. The patient has been provided with a written plan.    Health Maintenance   Topic Date Due   • TDAP/TD VACCINES (1 - Tdap) 01/30/1956   • ZOSTER VACCINE (1 of 2) 11/21/2020 (Originally 1/30/1993)   • INFLUENZA VACCINE  08/01/2020   • LIPID PANEL  05/15/2021   • COLONOSCOPY  07/19/2026          The following portions of the patient's history were reviewed and updated as appropriate: allergies, current medications, past family history, past medical history, past social history, past surgical history and problem list.    Outpatient Medications Prior to Visit   Medication Sig Dispense Refill   • aspirin 81 MG EC tablet Take 81 mg by mouth Daily. Three times a week     • cetirizine (zyrTEC) 10 MG tablet Take  by mouth.     • Cholecalciferol (VITAMIN D3) 2000 UNITS capsule Take  by mouth.     • lisinopril (PRINIVIL,ZESTRIL) 10 MG tablet Take 10 mg by mouth Daily.     • lovastatin (MEVACOR) 40 MG tablet Take  by mouth.     • omeprazole (priLOSEC) 40 MG capsule Take 1 capsule by mouth Daily. 90 capsule 2   • therapeutic multivitamin-minerals (THERAGRAN-M) tablet Take  by mouth.     • Avanafil 200 MG tablet Take  by mouth.       No facility-administered medications prior to visit.        Patient Active Problem List   Diagnosis   • FHx: prostate cancer   • Pacemaker   • Peptic ulcer disease   • BPH (benign prostatic hyperplasia)   • Bradycardia   • Erectile dysfunction of organic origin   • Gastroesophageal reflux disease   • Fleming's esophagus without dysplasia   • Nonsmoker   • Obesity, unspecified obesity severity, unspecified obesity type   • HTN (hypertension), benign       Advanced Care Planning:  ACP discussion was held with the patient during this visit. Patient does not  "have an advance directive, information provided.    Review of Systems    Compared to one year ago, the patient feels his physical health is the same.  Compared to one year ago, the patient feels his mental health is the same.    Reviewed chart for potential of high risk medication in the elderly: yes  Reviewed chart for potential of harmful drug interactions in the elderly:yes    Objective         Vitals:    05/21/20 0946   BP: 124/84   BP Location: Left arm   Patient Position: Sitting   Pulse: 67   Temp: 97.8 °F (36.6 °C)   TempSrc: Temporal   SpO2: 98%   Weight: 103 kg (226 lb)   Height: 185.4 cm (73\")   PainSc: 0-No pain       Body mass index is 29.82 kg/m².  Discussed the patient's BMI with him. The BMI is above average; BMI management plan is completed.    Physical Exam    Lab Results   Component Value Date     (H) 05/15/2020    CHLPL 180 05/15/2020    TRIG 121 05/15/2020    HDL 44 05/15/2020     (H) 05/15/2020    VLDL 24.2 05/15/2020    HGBA1C 5.30 05/15/2020        Assessment/Plan   Medicare Risks and Personalized Health Plan  CMS Preventative Services Quick Reference  Advance Directive Discussion  Cardiovascular risk  Colon Cancer Screening  Depression/Dysphoria  Diabetic Lab Screening   Fall Risk  Immunizations Discussed/Encouraged (specific immunizations; Td, adacel Tdap and Shingrix )  Inactivity/Sedentary  Lung Cancer Risk  Obesity/Overweight   Osteoprorosis Risk  Prostate Cancer Screening     The above risks/problems have been discussed with the patient.  Pertinent information has been shared with the patient in the After Visit Summary.  Follow up plans and orders are seen below in the Assessment/Plan Section.    Diagnoses and all orders for this visit:    1. Need for Tdap vaccination (Primary)  -     Tdap Vaccine Greater Than or Equal To 8yo IM      Follow Up:  Return in about 6 months (around 11/21/2020) for Annual physical.     An After Visit Summary and PPPS were given to the " patient.

## 2020-08-10 DIAGNOSIS — K21.9 GASTROESOPHAGEAL REFLUX DISEASE, ESOPHAGITIS PRESENCE NOT SPECIFIED: ICD-10-CM

## 2020-08-10 RX ORDER — OMEPRAZOLE 40 MG/1
CAPSULE, DELAYED RELEASE ORAL
Qty: 90 CAPSULE | Refills: 0 | Status: SHIPPED | OUTPATIENT
Start: 2020-08-10 | End: 2020-10-23

## 2020-08-18 ENCOUNTER — OFFICE VISIT (OUTPATIENT)
Dept: CARDIOLOGY | Age: 75
End: 2020-08-18
Payer: MEDICARE

## 2020-08-18 VITALS
BODY MASS INDEX: 29.16 KG/M2 | HEART RATE: 68 BPM | WEIGHT: 220 LBS | HEIGHT: 73 IN | DIASTOLIC BLOOD PRESSURE: 92 MMHG | SYSTOLIC BLOOD PRESSURE: 140 MMHG

## 2020-08-18 PROCEDURE — 1123F ACP DISCUSS/DSCN MKR DOCD: CPT | Performed by: INTERNAL MEDICINE

## 2020-08-18 PROCEDURE — 99213 OFFICE O/P EST LOW 20 MIN: CPT | Performed by: INTERNAL MEDICINE

## 2020-08-18 PROCEDURE — G8427 DOCREV CUR MEDS BY ELIG CLIN: HCPCS | Performed by: INTERNAL MEDICINE

## 2020-08-18 PROCEDURE — 4040F PNEUMOC VAC/ADMIN/RCVD: CPT | Performed by: INTERNAL MEDICINE

## 2020-08-18 PROCEDURE — G8417 CALC BMI ABV UP PARAM F/U: HCPCS | Performed by: INTERNAL MEDICINE

## 2020-08-18 PROCEDURE — 93280 PM DEVICE PROGR EVAL DUAL: CPT | Performed by: INTERNAL MEDICINE

## 2020-08-18 PROCEDURE — 1036F TOBACCO NON-USER: CPT | Performed by: INTERNAL MEDICINE

## 2020-08-18 NOTE — PROGRESS NOTES
PA note    Called that patient has sleep apnea and de sating while sleeping into the high 80's-low 90's. Placed on 2L NC, now sating 97%, in NAD.  Call PA PRN
Pacemaker interrogated  Presenting rhythm:  AP VS, AP 42.1%,  1.0%  Battey voltage 5 years  Lead status:  Lead impedance within range and stable  Sensing:  P waves 4-5.6 mV,  R waves 15.68-22.4 mV  Thresholds:  Atrial 0.5V @ 0.4ms, ventricular 1.25@ 0.4ms  Observations:  5 mode switch episodes, percent of time 0.2%  Longest 9:21 hours on 6/10/20  Reprogramming for sensitivity and threshold testing  Next [facility] appointment:  11/18/20
we will monitor and should he have recurrent episodes, particularly of any duration, will institute anticoagulant therapy. 2.  Hypertension -elevated value today. Suggested they check it at random several times the next few weeks and relay those to Dr. Jennifer Tong. 3.  Dyslipidemia - with acceptable HDL and triglycerides  4. Pandemic response -appropriate    Medical records reviewed prior to today's clinic visit including visually reviewing recent diagnostic studies such as ECHOs and angiograms. More than 15 minutes spent face-to-face with patient in evaluating, and carefully explaining problems and the planned approach and the reasons behind the decisions.
PA note    Called by RN that patient was de sating while sleeping into the high 80's-low 90's. Placed on 2L NC, now sating 97%.  NAD  Lungs: CTA bilat.  -2L NC Nocturnal and PRN ordered  -Call PA PRN

## 2020-09-08 RX ORDER — LOVASTATIN 40 MG/1
40 TABLET ORAL NIGHTLY
Qty: 90 TABLET | Refills: 3 | Status: SHIPPED | OUTPATIENT
Start: 2020-09-08 | End: 2021-07-23

## 2020-09-08 NOTE — TELEPHONE ENCOUNTER
PATIENT IS REQUESTING TO HAVE lovastatin (MEVACOR) 40 MG tablet REFILLED. PATIENT IS REQUESTING TO HAVE MEDICATION SENT TO [facility] - [place], [place] - [address] - [phone]  - [phone]   [phone].

## 2020-10-07 ENCOUNTER — FLU SHOT (OUTPATIENT)
Dept: INTERNAL MEDICINE | Facility: CLINIC | Age: 75
End: 2020-10-07

## 2020-10-07 DIAGNOSIS — Z23 FLU VACCINE NEED: Primary | ICD-10-CM

## 2020-10-07 PROCEDURE — 90694 VACC AIIV4 NO PRSRV 0.5ML IM: CPT | Performed by: NURSE PRACTITIONER

## 2020-10-07 PROCEDURE — G0008 ADMIN INFLUENZA VIRUS VAC: HCPCS | Performed by: NURSE PRACTITIONER

## 2020-10-13 RX ORDER — LISINOPRIL 10 MG/1
10 TABLET ORAL DAILY
Qty: 90 TABLET | Refills: 3 | Status: SHIPPED | OUTPATIENT
Start: 2020-10-13 | End: 2020-10-19 | Stop reason: SDUPTHER

## 2020-10-13 NOTE — TELEPHONE ENCOUNTER
Caller: Ihnen, Merlin William    Relationship: Self    Best call back number: [phone]     Medication needed:   Requested Prescriptions     Pending Prescriptions Disp Refills   • lisinopril (PRINIVIL,ZESTRIL) 10 MG tablet        Sig: Take 1 tablet by mouth Daily.       When do you need the refill by: 10/19/2020    Does the patient have less than a 3 day supply:  [] Yes  [x] No    What is the patient's preferred pharmacy: [facility] - [facility] [address] - [phone] [facility] [phone]

## 2020-10-19 RX ORDER — LISINOPRIL 10 MG/1
10 TABLET ORAL DAILY
Qty: 90 TABLET | Refills: 3 | Status: SHIPPED | OUTPATIENT
Start: 2020-10-19 | End: 2021-07-16

## 2020-10-19 NOTE — TELEPHONE ENCOUNTER
Needs refill on lisinopril (PRINIVIL,ZESTRIL) 10 MG       [facility] - [place], [place] - [address]  - [phone] [facility] [phone] FX      Has 4 weeks left

## 2020-10-23 DIAGNOSIS — K21.9 GASTROESOPHAGEAL REFLUX DISEASE: ICD-10-CM

## 2020-10-26 RX ORDER — OMEPRAZOLE 40 MG/1
CAPSULE, DELAYED RELEASE ORAL
Qty: 90 CAPSULE | Refills: 0 | Status: SHIPPED | OUTPATIENT
Start: 2020-10-26 | End: 2020-12-29

## 2020-11-18 PROCEDURE — 93296 REM INTERROG EVL PM/IDS: CPT | Performed by: CLINICAL NURSE SPECIALIST

## 2020-11-18 PROCEDURE — 93294 REM INTERROG EVL PM/LDLS PM: CPT | Performed by: CLINICAL NURSE SPECIALIST

## 2020-11-20 ENCOUNTER — OFFICE VISIT (OUTPATIENT)
Dept: INTERNAL MEDICINE | Facility: CLINIC | Age: 75
End: 2020-11-20

## 2020-11-20 VITALS
WEIGHT: 222 LBS | TEMPERATURE: 97.5 F | DIASTOLIC BLOOD PRESSURE: 70 MMHG | HEART RATE: 83 BPM | OXYGEN SATURATION: 98 % | HEIGHT: 73 IN | SYSTOLIC BLOOD PRESSURE: 130 MMHG | BODY MASS INDEX: 29.42 KG/M2

## 2020-11-20 DIAGNOSIS — M72.2 PLANTAR FASCIITIS, RIGHT: Primary | ICD-10-CM

## 2020-11-20 DIAGNOSIS — Z00.00 ENCOUNTER FOR ANNUAL PHYSICAL EXAM: ICD-10-CM

## 2020-11-20 DIAGNOSIS — Z23 NEED FOR 23-POLYVALENT PNEUMOCOCCAL POLYSACCHARIDE VACCINE: ICD-10-CM

## 2020-11-20 PROCEDURE — 90732 PPSV23 VACC 2 YRS+ SUBQ/IM: CPT | Performed by: NURSE PRACTITIONER

## 2020-11-20 PROCEDURE — G0009 ADMIN PNEUMOCOCCAL VACCINE: HCPCS | Performed by: NURSE PRACTITIONER

## 2020-11-20 PROCEDURE — 99213 OFFICE O/P EST LOW 20 MIN: CPT | Performed by: NURSE PRACTITIONER

## 2020-11-20 RX ORDER — FLUTICASONE PROPIONATE 50 MCG
1 SPRAY, SUSPENSION (ML) NASAL DAILY
COMMUNITY
Start: 2018-01-24 | End: 2023-03-22 | Stop reason: SDUPTHER

## 2020-11-20 NOTE — PATIENT INSTRUCTIONS
Plantar Fasciitis Rehab  Ask your health care provider which exercises are safe for you. Do exercises exactly as told by your health care provider and adjust them as directed. It is normal to feel mild stretching, pulling, tightness, or discomfort as you do these exercises. Stop right away if you feel sudden pain or your pain gets worse. Do not begin these exercises until told by your health care provider.  Stretching and range-of-motion exercises  These exercises warm up your muscles and joints and improve the movement and flexibility of your foot. These exercises also help to relieve pain.  Plantar fascia stretch    1. Sit with your left / right leg crossed over your opposite knee.  2. Hold your heel with one hand with that thumb near your arch. With your other hand, hold your toes and gently pull them back toward the top of your foot. You should feel a stretch on the bottom of your toes or your foot (plantar fascia) or both.  3. Hold this stretch for__________ seconds.  4. Slowly release your toes and return to the starting position.  Repeat __________ times. Complete this exercise __________ times a day.  Gastrocnemius stretch, standing  This exercise is also called a calf (gastroc) stretch. It stretches the muscles in the back of the upper calf.  1. Stand with your hands against a wall.  2. Extend your left / right leg behind you, and bend your front knee slightly.  3. Keeping your heels on the floor and your back knee straight, shift your weight toward the wall. Do not arch your back. You should feel a gentle stretch in your upper left / right calf.  4. Hold this position for __________ seconds.  Repeat __________ times. Complete this exercise __________ times a day.  Soleus stretch, standing  This exercise is also called a calf (soleus) stretch. It stretches the muscles in the back of the lower calf.  1. Stand with your hands against a wall.  2. Extend your left / right leg behind you, and bend your front  knee slightly.  3. Keeping your heels on the floor, bend your back knee and shift your weight slightly over your back leg. You should feel a gentle stretch deep in your lower calf.  4. Hold this position for __________ seconds.  Repeat __________ times. Complete this exercise __________ times a day.  Gastroc and soleus stretch, standing step  This exercise stretches the muscles in the back of the lower leg. These muscles are in the upper calf (gastrocnemius) and the lower calf (soleus).  1. Stand with the ball of your left / right foot on a step. The ball of your foot is on the walking surface, right under your toes.  2. Keep your other foot firmly on the same step.  3. Hold on to the wall or a railing for balance.  4. Slowly lift your other foot, allowing your body weight to press your left / right heel down over the edge of the step. You should feel a stretch in your left / right calf.  5. Hold this position for __________ seconds.  6. Return both feet to the step.  7. Repeat this exercise with a slight bend in your left / right knee.  Repeat __________ times with your left / right knee straight and __________ times with your left / right knee bent. Complete this exercise __________ times a day.  Balance exercise  This exercise builds your balance and strength control of your arch to help take pressure off your plantar fascia.  Single leg stand  If this exercise is too easy, you can try it with your eyes closed or while standing on a pillow.  1. Without shoes, stand near a railing or in a doorway. You may hold on to the railing or door frame as needed.  2. Stand on your left / right foot. Keep your big toe down on the floor and try to keep your arch lifted. Do not let your foot roll inward.  3. Hold this position for __________ seconds.  Repeat __________ times. Complete this exercise __________ times a day.  This information is not intended to replace advice given to you by your health care provider. Make sure  you discuss any questions you have with your health care provider.  Document Released: 12/18/2006 Document Revised: 04/09/2020 Document Reviewed: 10/16/2019  Elsevier Patient Education © 2020 [employer]

## 2020-11-20 NOTE — PROGRESS NOTES
Subjective   Merlin William Ihnen is a 75 y.o. male.   Chief Complaint   Patient presents with   • Annual Exam   • Foot Pain     Right heel. Started about a couple weeks ago.        Mr. Eugene is a 74 yo male who presents for annual visit and labs discussion. He reports that he has been doing well with exception to intermittent issue with right heel pain. He has a history of issues with plantar fascitis. He reports using a ice water bottle and rolling across his heel with moderate relief in symptoms. He has not tried physical therapy, but has been trying stretches at home with mild relief in severity and frequency of his foot pain symptoms.        The following portions of the patient's history were reviewed and updated as appropriate: allergies, current medications, past family history, past medical history, past social history, past surgical history and problem list.    Review of Systems   Constitutional: Negative for activity change, appetite change, fatigue, fever, unexpected weight gain and unexpected weight loss.   HENT: Negative for swollen glands, trouble swallowing and voice change.    Eyes: Negative for blurred vision and visual disturbance.   Respiratory: Negative for cough and shortness of breath.    Cardiovascular: Negative for chest pain, palpitations and leg swelling.   Gastrointestinal: Negative for abdominal pain, constipation, diarrhea, nausea, vomiting and indigestion.   Endocrine: Negative for cold intolerance, heat intolerance, polydipsia and polyphagia.   Genitourinary: Negative for dysuria and frequency.   Musculoskeletal: Positive for arthralgias. Negative for back pain, joint swelling and neck pain.   Skin: Negative for color change, rash and skin lesions.   Neurological: Negative for dizziness, weakness, headache, memory problem and confusion.   Hematological: Does not bruise/bleed easily.   Psychiatric/Behavioral: Negative for agitation, hallucinations and suicidal ideas. The patient is not  nervous/anxious.        Objective   Past Medical History:   Diagnosis Date   • Cancer (CMS/HCC)    • Gastrointestinal stromal tumor (GIST) (CMS/HCC)    • GERD (gastroesophageal reflux disease)    • Liver cirrhosis (CMS/HCC)    • Pacemaker    • Squamous cell skin cancer       Past Surgical History:   Procedure Laterality Date   • COLON SURGERY     • COLONOSCOPY W/ POLYPECTOMY  07/19/2016    Polyp at 70 cm proximal to anus no changes of adenomatous or hyperplastic polyp identified repeat exam in 5 years   • COLONOSCOPY W/ POLYPECTOMY  04/19/2013    Early Tubular adenoma repeat exam in 3 years   • ENDOSCOPY  07/19/2016    Chronic esophagogastritis mild with Intestinal Metaplasia repeat exam in 3 years   • ENDOSCOPY  04/19/2013    Mild chronic inflammation negative for Intestinal Metaplasia    • ENDOSCOPY N/A 10/2/2019    Procedure: ESOPHAGOGASTRODUODENOSCOPY WITH ANESTHESIA;  Surgeon: Keyon Ann MD;  Location: [facility] ENDOSCOPY;  Service: Gastroenterology   • NECK SURGERY     • PACEMAKER IMPLANTATION     • SQUAMOUS CELL CARCINOMA EXCISION          Current Outpatient Medications:   •  aspirin 81 MG EC tablet, Take 81 mg by mouth Daily. Three times a week, Disp: , Rfl:   •  cetirizine (zyrTEC) 10 MG tablet, Take  by mouth., Disp: , Rfl:   •  Cholecalciferol (VITAMIN D3) 2000 UNITS capsule, Take  by mouth., Disp: , Rfl:   •  fluticasone (Flonase) 50 MCG/ACT nasal spray, 1 spray into the nostril(s) as directed by provider Daily., Disp: , Rfl:   •  lisinopril (PRINIVIL,ZESTRIL) 10 MG tablet, Take 1 tablet by mouth Daily., Disp: 90 tablet, Rfl: 3  •  lovastatin (MEVACOR) 40 MG tablet, Take 1 tablet by mouth Every Night., Disp: 90 tablet, Rfl: 3  •  omeprazole (priLOSEC) 40 MG capsule, TAKE 1 CAPSULE EVERY DAY, Disp: 90 capsule, Rfl: 0  •  therapeutic multivitamin-minerals (THERAGRAN-M) tablet, Take  by mouth., Disp: , Rfl:   •  Avanafil 200 MG tablet, Take  by mouth., Disp: , Rfl:      Vitals:    11/20/20 1120   BP:  130/70   Pulse: 83   Temp: 97.5 °F (36.4 °C)   SpO2: 98%         11/20/20  1120   Weight: 101 kg (222 lb)     Patient's Body mass index is 29.29 kg/m². BMI is above normal parameters. Recommendations include: educational material, exercise counseling and nutrition counseling.      Physical Exam  Vitals signs and nursing note reviewed.   Constitutional:       Appearance: He is well-developed.   HENT:      Head: Normocephalic and atraumatic.      Right Ear: External ear normal.      Left Ear: External ear normal.   Eyes:      Conjunctiva/sclera: Conjunctivae normal.      Pupils: Pupils are equal, round, and reactive to light.   Neck:      Musculoskeletal: Normal range of motion and neck supple.      Thyroid: No thyromegaly.   Cardiovascular:      Rate and Rhythm: Normal rate and regular rhythm.      Heart sounds: Normal heart sounds.   Pulmonary:      Effort: Pulmonary effort is normal.      Breath sounds: Normal breath sounds.   Abdominal:      General: Bowel sounds are normal.      Palpations: Abdomen is soft.   Musculoskeletal:      Right foot: Normal range of motion. Tenderness present. No bony tenderness, swelling or crepitus.   Lymphadenopathy:      Cervical: No cervical adenopathy.   Skin:     General: Skin is warm and dry.   Neurological:      Mental Status: He is alert and oriented to person, place, and time.   Psychiatric:         Behavior: Behavior normal.         Thought Content: Thought content normal.       Assessment/Plan   Diagnoses and all orders for this visit:    1. Plantar fasciitis, right (Primary)    2. Need for 23-polyvalent pneumococcal polysaccharide vaccine    3. Encounter for annual physical exam    Discussed lab findings and advised stretches and topical treatments to help with plantar fasciitis. Patient is due for pneumonia 23 today. Blood pressure controlled with current medical therapies,he denies elevated blood pressure readings at home. Will follow up in 6 months for blood pressure  monitoring.

## 2020-12-28 DIAGNOSIS — K21.9 GASTROESOPHAGEAL REFLUX DISEASE: ICD-10-CM

## 2021-01-04 RX ORDER — OMEPRAZOLE 40 MG/1
CAPSULE, DELAYED RELEASE ORAL
Qty: 90 CAPSULE | Refills: 2 | Status: SHIPPED | OUTPATIENT
Start: 2021-01-04 | End: 2021-07-23

## 2021-02-18 DIAGNOSIS — Z95.0 PACEMAKER: ICD-10-CM

## 2021-02-18 DIAGNOSIS — R00.1 BRADYCARDIA: Primary | ICD-10-CM

## 2021-02-18 PROCEDURE — 93294 REM INTERROG EVL PM/LDLS PM: CPT | Performed by: CLINICAL NURSE SPECIALIST

## 2021-02-18 PROCEDURE — 93296 REM INTERROG EVL PM/IDS: CPT | Performed by: CLINICAL NURSE SPECIALIST

## 2021-02-24 ENCOUNTER — OFFICE VISIT (OUTPATIENT)
Dept: CARDIOLOGY CLINIC | Age: 76
End: 2021-02-24
Payer: MEDICARE

## 2021-02-24 VITALS
SYSTOLIC BLOOD PRESSURE: 138 MMHG | DIASTOLIC BLOOD PRESSURE: 78 MMHG | HEIGHT: 73 IN | HEART RATE: 75 BPM | WEIGHT: 233 LBS | BODY MASS INDEX: 30.88 KG/M2

## 2021-02-24 DIAGNOSIS — R00.1 BRADYCARDIA: Primary | ICD-10-CM

## 2021-02-24 DIAGNOSIS — Z95.0 PACEMAKER: ICD-10-CM

## 2021-02-24 DIAGNOSIS — I48.0 PAF (PAROXYSMAL ATRIAL FIBRILLATION) (HCC): ICD-10-CM

## 2021-02-24 PROCEDURE — 99213 OFFICE O/P EST LOW 20 MIN: CPT | Performed by: CLINICAL NURSE SPECIALIST

## 2021-02-24 PROCEDURE — 4040F PNEUMOC VAC/ADMIN/RCVD: CPT | Performed by: CLINICAL NURSE SPECIALIST

## 2021-02-24 PROCEDURE — G8484 FLU IMMUNIZE NO ADMIN: HCPCS | Performed by: CLINICAL NURSE SPECIALIST

## 2021-02-24 PROCEDURE — G8427 DOCREV CUR MEDS BY ELIG CLIN: HCPCS | Performed by: CLINICAL NURSE SPECIALIST

## 2021-02-24 PROCEDURE — G8417 CALC BMI ABV UP PARAM F/U: HCPCS | Performed by: CLINICAL NURSE SPECIALIST

## 2021-02-24 PROCEDURE — 1123F ACP DISCUSS/DSCN MKR DOCD: CPT | Performed by: CLINICAL NURSE SPECIALIST

## 2021-02-24 PROCEDURE — 1036F TOBACCO NON-USER: CPT | Performed by: CLINICAL NURSE SPECIALIST

## 2021-02-24 PROCEDURE — 93280 PM DEVICE PROGR EVAL DUAL: CPT | Performed by: CLINICAL NURSE SPECIALIST

## 2021-02-24 RX ORDER — ZINC GLUCONATE 50 MG
50 TABLET ORAL DAILY
COMMUNITY

## 2021-02-24 NOTE — PATIENT INSTRUCTIONS
Send [facility] home pacemaker reading on 5-26-21   Follow up in Aug With Dr. Dayana Jean   Call with any questions or concerns  Follow up with Nancy Avila MD for non cardiac problems  Report any new problems  Cardiovascular Fitness-Exercise as tolerated. Strive for 30 minutes of exercise most days of the week. Cardiac / Healthy Diet  Continue current medications as directed  Continue plan of treatment  It is always recommended that you bring your medications bottles with you to each visit - this is for your safety!

## 2021-02-24 NOTE — PROGRESS NOTES
[facility]  [facility] [address]  [id number]  Phone: [phone]  Fax: [phone]    OFFICE VISIT:  2021    Karina Adams - : 1945    Reason For Visit:  Jocelyn An is a 64 y.o. male who is here for 6 Month Follow-Up (no cardiac symptoms) and Bradycardia (pacemaker)  History of bradycardia requiring pacemaker implanted in  with generator change . Returns today for routine follow-up. He states he is active and doing well  Has been isolating and has not been ill    Subjective  Merlin denies exertional chest pain, shortness of breath, orthopnea, paroxysmal nocturnal dyspnea, syncope, presyncope, arrhythmia, edema and fatigue. The patient denies numbness or weakness to suggest cerebrovascular accident or transient ischemic attack. Yusuf Villalobos MD is PCP and follows labs including lipids.   Karina Adams has the following [facility]:    Patient Active Problem List    Diagnosis Date Noted    BPH (benign prostatic hyperplasia) 06/15/2016    Erectile dysfunction of organic origin 06/15/2016    FHx: prostate cancer 06/15/2016    Peptic ulcer disease     Pacemaker     Bradycardia      Past Medical History:   Diagnosis Date    BCC (basal cell carcinoma of skin)     BPH (benign prostatic hyperplasia) 6/15/2016    Bradycardia     Dysmetabolic syndrome     Elevated PSA     Fatty liver     Gout     Hypercholesteremia     Dr. Luiz Cotter manages    Hyperlipidemia     Hypertension     Pacemaker 13  MDL    generator replacement    Peptic ulcer disease     Stomach cancer ([address]      Past Surgical History:   Procedure Laterality Date    CERVICAL SPINE SURGERY      COLONOSCOPY      CYST REMOVAL      GASTRECTOMY      HEMICOLECTOMY      PACEMAKER INSERTION  13    generator change    UPPER GASTROINTESTINAL ENDOSCOPY       Family History   Problem Relation Age of Onset    Heart Disease Mother     Prostate Cancer Brother      Social History     Tobacco Use    Smoking status: Former Smoker     Quit date:      Years since quittin.1    Smokeless tobacco: Never Used   Substance Use Topics    Alcohol use: Yes      Current Outpatient Medications   Medication Sig Dispense Refill    zinc 50 MG TABS tablet Take 50 mg by mouth daily      Apoaequorin (PREVAGEN) 10 MG CAPS Take by mouth daily      tadalafil (CIALIS) 20 MG tablet Take 1 tablet by mouth as needed for Erectile Dysfunction 2 tablet 1    lisinopril (PRINIVIL;ZESTRIL) 10 MG tablet Take 10 mg by mouth daily      lovastatin (MEVACOR) 40 MG tablet Take 40 mg by mouth nightly      aspirin 81 MG tablet Take 81 mg by mouth three times a week       omeprazole (PRILOSEC) 40 MG capsule Take 1 capsule by mouth daily      cetirizine (ZYRTEC ALLERGY) 10 MG tablet Take 10 mg by mouth daily.  Multiple Vitamins-Minerals (THERAPEUTIC MULTIVITAMIN-MINERALS) tablet Take 1 tablet by mouth daily.  Cholecalciferol (VITAMIN D3) 2000 UNITS CAPS Take by mouth daily        No current facility-administered medications for this visit. Allergies: Amoxicillin and Penicillins    Review of Systems  Constitutional - no significant activity change, appetite change, or unexpected weight change. No fever, chills or diaphoresis. No fatigue. HEENT - no significant rhinorrhea or epistaxis. No tinnitus or significant hearing loss. Eyes - no sudden vision change or amaurosis. Respiratory - no significant wheezing, stridor, apnea or cough. No dyspnea on exertion or shortness of breath. Cardiovascular - no exertional chest pain, orthopnea or PND. No sensation of arrhythmia or slow heart rate. No claudication or leg edema. Gastrointestinal - no abdominal swelling or pain. No blood in stool. No severe constipation, diarrhea, nausea, or vomiting. Genitourinary - no difficulty urinating, dysuria, frequency, or urgency. No flank pain or hematuria.    Musculoskeletal - no back pain, gait disturbance, or myalgia. Skin - no color change or rash. No pallor. No new surgical incision. Neurologic - no speech difficulty, facial asymmetry or lateralizing weakness. No seizures, presyncope, syncope, or significant dizziness. Hematologic - no easy bruising or excessive bleeding. Psychiatric - no severe anxiety or insomnia. No confusion. All other review of systems are negative. Objective  Vital Signs - /78   Pulse 75   Ht 6' 1\" (1.854 m)   Wt 233 lb (105.7 kg)   BMI 30.74 kg/m²   General - Merlin is alert, cooperative, and pleasant. Well groomed. No acute distress. Body habitus is overweight. HEENT - The head is normocephalic. No circumoral cyanosis. Dentition is normal.   EYES -  No Xanthelasma, no arcus senilis, no conjunctival hemorrhages or discharge. Neck - Supple, without increased jugular venous pressures. No carotid bruits. No mass. Respiratory - Lungs are clear bilaterally. No wheezes or rales. Normal effort without use of accessory muscles. Cardiovascular - Heart has regular rhythm and rate. No murmurs, rubs or gallops. + pedal pulses and no varicosities. Abdominal -  Soft, nontender, nondistended. Bowel sounds are intact. Extremities - No clubbing, cyanosis, or  edema. Musculoskeletal -  No clubbing . No Osler's nodes. Gait normal .  No kyphosis or scoliosis. Skin -  no statis ulcers or dermatitis. Neurological - No focal signs are identified. Oriented to person, place and time. Psychiatric -  Appropriate affect and mood. Assessment:     Diagnosis Orders   1. Bradycardia     2. Pacemaker     3.  PAF (paroxysmal atrial fibrillation) ([address]       Data:  BP Readings from Last 3 Encounters:   02/24/21 138/78   08/18/20 (!) 140/92   02/18/20 134/64    Pulse Readings from Last 3 Encounters:   02/24/21 75   08/18/20 68   02/18/20 76        Wt Readings from Last 3 Encounters:   02/24/21 233 lb (105.7 kg)   08/18/20 220 lb (99.8 kg) 02/18/20 234 lb (106.1 kg)     Pacemaker check showed adequate battery status- approximately 4 years   and  appropriate diagnostics without any sustained arrhythmias. Mode AAIR-DDDR at 60  AS-VS 58.2%  AP- VS 41%  AS- 0.2%  AP-  0.6%  Next check in 3 months via [facility] home monitoring system      Blood pressure and heart rate controlled. Medical management includes ACE inhibitor statin and aspirin    States taking medications as prescribed  Stable cardiovascular status. No evidence of overt heart failure, angina or dysrhythmia. Plan  Send [facility] home pacemaker reading on 5-26-21   Follow up in Aug With Dr. Rambo Burks   Call with any questions or concerns  Follow up with Lazaro Pena MD for non cardiac problems  Report any new problems  Cardiovascular Fitness-Exercise as tolerated. Strive for 30 minutes of exercise most days of the week. Cardiac / Healthy Diet  Continue current medications as directed  Continue plan of treatment  It is always recommended that you bring your medications bottles with you to each visit - this is for your safety! CHERYL Sapp    EMR dragon/transcription disclaimer: Much of this encounter note is electronic transcription/translation of spoken language to printed tach. Electronic translation of spoken language may be erroneous, or at times, nonsensical words or phrases may be inadvertently transcribed.  Although, I have reviewed the note for such errors, some may still exist.

## 2021-03-08 ENCOUNTER — IMMUNIZATION (OUTPATIENT)
Age: 76
End: 2021-03-08
Payer: MEDICARE

## 2021-03-08 PROCEDURE — 91300 COVID-19, PFIZER VACCINE 30MCG/0.3ML DOSE: CPT | Performed by: FAMILY MEDICINE

## 2021-03-08 PROCEDURE — 0001A COVID-19, PFIZER VACCINE 30MCG/0.3ML DOSE: CPT | Performed by: FAMILY MEDICINE

## 2021-03-29 ENCOUNTER — IMMUNIZATION (OUTPATIENT)
Age: 76
End: 2021-03-29
Payer: MEDICARE

## 2021-03-29 PROCEDURE — 91300 COVID-19, PFIZER VACCINE 30MCG/0.3ML DOSE: CPT | Performed by: FAMILY MEDICINE

## 2021-03-29 PROCEDURE — 0002A COVID-19, PFIZER VACCINE 30MCG/0.3ML DOSE: CPT | Performed by: FAMILY MEDICINE

## 2021-05-06 ENCOUNTER — TELEPHONE (OUTPATIENT)
Dept: INTERNAL MEDICINE | Facility: CLINIC | Age: 76
End: 2021-05-06

## 2021-05-06 ENCOUNTER — TELEPHONE (OUTPATIENT)
Dept: UROLOGY | Age: 76
End: 2021-05-06

## 2021-05-06 NOTE — TELEPHONE ENCOUNTER
PATIENT CALLED IN TO CHECK ON THE STATUS OF A MEDICAL RECORD  REQUEST FROM   DR DE LA O    PLEASE FAX TO     [phone]

## 2021-05-07 NOTE — TELEPHONE ENCOUNTER
Called pt yesterday and explained we had not rec. Request.  Received fax release this morning -5/7/21 and faxed paperwork to Destinee/Blanca

## 2021-05-20 ENCOUNTER — LAB (OUTPATIENT)
Dept: INTERNAL MEDICINE | Facility: CLINIC | Age: 76
End: 2021-05-20

## 2021-05-20 DIAGNOSIS — R73.01 IMPAIRED FASTING GLUCOSE: ICD-10-CM

## 2021-05-20 DIAGNOSIS — E78.5 HYPERLIPIDEMIA, UNSPECIFIED HYPERLIPIDEMIA TYPE: ICD-10-CM

## 2021-05-20 DIAGNOSIS — I10 HTN (HYPERTENSION), BENIGN: ICD-10-CM

## 2021-05-20 DIAGNOSIS — E78.00 PURE HYPERCHOLESTEROLEMIA: Primary | ICD-10-CM

## 2021-05-20 DIAGNOSIS — Z11.59 NEED FOR HEPATITIS C SCREENING TEST: ICD-10-CM

## 2021-05-20 DIAGNOSIS — Z12.5 SCREENING PSA (PROSTATE SPECIFIC ANTIGEN): ICD-10-CM

## 2021-05-21 LAB
ALBUMIN SERPL-MCNC: 4.7 G/DL (ref 3.5–5.2)
ALBUMIN/GLOB SERPL: 1.6 G/DL
ALP SERPL-CCNC: 56 U/L (ref 39–117)
ALT SERPL-CCNC: 40 U/L (ref 1–41)
APPEARANCE UR: CLEAR
AST SERPL-CCNC: 69 U/L (ref 1–40)
BACTERIA #/AREA URNS HPF: NORMAL /HPF
BASOPHILS # BLD AUTO: 0.04 10*3/MM3 (ref 0–0.2)
BASOPHILS NFR BLD AUTO: 0.7 % (ref 0–1.5)
BILIRUB SERPL-MCNC: 0.6 MG/DL (ref 0–1.2)
BILIRUB UR QL STRIP: NEGATIVE
BUN SERPL-MCNC: 12 MG/DL (ref 8–23)
BUN/CREAT SERPL: 14.1 (ref 7–25)
CALCIUM SERPL-MCNC: 9.9 MG/DL (ref 8.6–10.5)
CASTS URNS MICRO: NORMAL
CHLORIDE SERPL-SCNC: 97 MMOL/L (ref 98–107)
CHOLEST SERPL-MCNC: 178 MG/DL (ref 0–200)
CO2 SERPL-SCNC: 25.4 MMOL/L (ref 22–29)
COLOR UR: YELLOW
CREAT SERPL-MCNC: 0.85 MG/DL (ref 0.76–1.27)
EOSINOPHIL # BLD AUTO: 0.34 10*3/MM3 (ref 0–0.4)
EOSINOPHIL NFR BLD AUTO: 6.1 % (ref 0.3–6.2)
EPI CELLS #/AREA URNS HPF: NORMAL /HPF
ERYTHROCYTE [DISTWIDTH] IN BLOOD BY AUTOMATED COUNT: 12.3 % (ref 12.3–15.4)
GLOBULIN SER CALC-MCNC: 2.9 GM/DL
GLUCOSE SERPL-MCNC: 102 MG/DL (ref 65–99)
GLUCOSE UR QL: NEGATIVE
HBA1C MFR BLD: 5.4 % (ref 4.8–5.6)
HCT VFR BLD AUTO: 41.4 % (ref 37.5–51)
HCV AB S/CO SERPL IA: 0.1 S/CO RATIO (ref 0–0.9)
HDLC SERPL-MCNC: 51 MG/DL (ref 40–60)
HGB BLD-MCNC: 14.1 G/DL (ref 13–17.7)
HGB UR QL STRIP: NEGATIVE
IMM GRANULOCYTES # BLD AUTO: 0.03 10*3/MM3 (ref 0–0.05)
IMM GRANULOCYTES NFR BLD AUTO: 0.5 % (ref 0–0.5)
KETONES UR QL STRIP: NEGATIVE
LDLC SERPL CALC-MCNC: 103 MG/DL (ref 0–100)
LEUKOCYTE ESTERASE UR QL STRIP: NEGATIVE
LYMPHOCYTES # BLD AUTO: 1.51 10*3/MM3 (ref 0.7–3.1)
LYMPHOCYTES NFR BLD AUTO: 27.3 % (ref 19.6–45.3)
MCH RBC QN AUTO: 32.6 PG (ref 26.6–33)
MCHC RBC AUTO-ENTMCNC: 34.1 G/DL (ref 31.5–35.7)
MCV RBC AUTO: 95.8 FL (ref 79–97)
MONOCYTES # BLD AUTO: 0.45 10*3/MM3 (ref 0.1–0.9)
MONOCYTES NFR BLD AUTO: 8.1 % (ref 5–12)
NEUTROPHILS # BLD AUTO: 3.16 10*3/MM3 (ref 1.7–7)
NEUTROPHILS NFR BLD AUTO: 57.3 % (ref 42.7–76)
NITRITE UR QL STRIP: NEGATIVE
NRBC BLD AUTO-RTO: 0 /100 WBC (ref 0–0.2)
PH UR STRIP: 6.5 [PH] (ref 5–8)
PLATELET # BLD AUTO: 200 10*3/MM3 (ref 140–450)
POTASSIUM SERPL-SCNC: 4.8 MMOL/L (ref 3.5–5.2)
PROT SERPL-MCNC: 7.6 G/DL (ref 6–8.5)
PROT UR QL STRIP: NEGATIVE
PSA SERPL-MCNC: 2.68 NG/ML (ref 0–4)
RBC # BLD AUTO: 4.32 10*6/MM3 (ref 4.14–5.8)
RBC #/AREA URNS HPF: NORMAL /HPF
SODIUM SERPL-SCNC: 136 MMOL/L (ref 136–145)
SP GR UR: 1.01 (ref 1–1.03)
TRIGL SERPL-MCNC: 138 MG/DL (ref 0–150)
TSH SERPL DL<=0.005 MIU/L-ACNC: 3.86 UIU/ML (ref 0.27–4.2)
URATE SERPL-MCNC: 7.4 MG/DL (ref 3.4–7)
UROBILINOGEN UR STRIP-MCNC: NORMAL MG/DL
VLDLC SERPL CALC-MCNC: 24 MG/DL (ref 5–40)
WBC # BLD AUTO: 5.53 10*3/MM3 (ref 3.4–10.8)
WBC #/AREA URNS HPF: NORMAL /HPF

## 2021-05-24 ENCOUNTER — OFFICE VISIT (OUTPATIENT)
Dept: INTERNAL MEDICINE | Facility: CLINIC | Age: 76
End: 2021-05-24

## 2021-05-24 VITALS
DIASTOLIC BLOOD PRESSURE: 76 MMHG | TEMPERATURE: 98 F | HEIGHT: 73 IN | OXYGEN SATURATION: 99 % | SYSTOLIC BLOOD PRESSURE: 128 MMHG | BODY MASS INDEX: 30.75 KG/M2 | HEART RATE: 69 BPM | WEIGHT: 232 LBS

## 2021-05-24 DIAGNOSIS — K21.9 GASTROESOPHAGEAL REFLUX DISEASE, UNSPECIFIED WHETHER ESOPHAGITIS PRESENT: ICD-10-CM

## 2021-05-24 DIAGNOSIS — Z00.00 MEDICARE ANNUAL WELLNESS VISIT, SUBSEQUENT: Primary | ICD-10-CM

## 2021-05-24 DIAGNOSIS — I48.0 PAF (PAROXYSMAL ATRIAL FIBRILLATION) (HCC): ICD-10-CM

## 2021-05-24 DIAGNOSIS — I10 HTN (HYPERTENSION), BENIGN: ICD-10-CM

## 2021-05-24 DIAGNOSIS — E78.00 PURE HYPERCHOLESTEROLEMIA: ICD-10-CM

## 2021-05-24 DIAGNOSIS — Z23 NEED FOR SHINGLES VACCINE: ICD-10-CM

## 2021-05-24 PROCEDURE — G0439 PPPS, SUBSEQ VISIT: HCPCS | Performed by: NURSE PRACTITIONER

## 2021-05-24 NOTE — PROGRESS NOTES
The ABCs of the Annual Wellness Visit  Subsequent Medicare Wellness Visit    Chief Complaint   Patient presents with   • Medicare Wellness-subsequent       Subjective   History of Present Illness:  Merlin William Ihnen is a 76 y.o. male who presents for a Subsequent Medicare Wellness Visit.  Patient denies acute complains. He recently had pacemaker follow up with [facility] and completed interrogation of pacemaker. He denies chest pain, shortness of breath, bradycardia, exercise intolerance, palpitation, or lower extremity edema. He denies elevated blood pressure readings at home. Discussed labs with patient. Noted slightly elevated AST and patient denies OTC supplements, increase use of tylenol products, or abdominal pain with bowel pattern/stool changes. He denies abnormal bruising, yellowing skin, or abdominal swelling. He is on statin therapy for cholesterol. Patient is working on diet and increasing exercise. He is due for shingles vaccine.    HEALTH RISK ASSESSMENT    Recent Hospitalizations:  No hospitalization(s) within the last year.    Current Medical Providers:  Patient Care Team:  Celestina Pritchard APRN as PCP - General (Nurse Practitioner)  Keyon Ann MD as Consulting Physician (Gastroenterology)    Smoking Status:  Social History     Tobacco Use   Smoking Status Former Smoker   • Packs/day: 0.50   • Years: 10.00   • Pack years: 5.00   • Types: Cigarettes   Smokeless Tobacco Never Used   Tobacco Comment    quit over 50 yrs ago       Alcohol Consumption:  Social History     Substance and Sexual Activity   Alcohol Use Yes    Comment: 4-5 times weekly beer       Depression Screen:   PHQ-2/PHQ-9 Depression Screening 5/24/2021   Little interest or pleasure in doing things 0   Feeling down, depressed, or hopeless 0   Total Score 0       Fall Risk Screen:  JASIELADI Fall Risk Assessment was completed, and patient is at LOW risk for falls.Assessment completed on:5/24/2021    Health Habits  and Functional and Cognitive Screening:  Functional & Cognitive Status 5/24/2021   Do you have difficulty preparing food and eating? No   Do you have difficulty bathing yourself, getting dressed or grooming yourself? No   Do you have difficulty using the toilet? No   Do you have difficulty moving around from place to place? No   Do you have trouble with steps or getting out of a bed or a chair? No   Current Diet Well Balanced Diet   Dental Exam Up to date   Eye Exam Up to date   Exercise (times per week) 2 times per week   Current Exercise Activities Include Walking   Do you need help using the phone?  No   Are you deaf or do you have serious difficulty hearing?  Yes   Do you need help with transportation? No   Do you need help shopping? No   Do you need help preparing meals?  No   Do you need help with housework?  No   Do you need help with laundry? No   Do you need help taking your medications? No   Do you need help managing money? No   Do you ever drive or ride in a car without wearing a seat belt? No   Have you felt unusual stress, anger or loneliness in the last month? No   Who do you live with? Spouse   If you need help, do you have trouble finding someone available to you? No   Have you been bothered in the last four weeks by sexual problems? No   Do you have difficulty concentrating, remembering or making decisions? No         Does the patient have evidence of cognitive impairment? No    Asprin use counseling:Start ASA 81 mg daily     Age-appropriate Screening Schedule:  Refer to the list below for future screening recommendations based on patient's age, sex and/or medical conditions. Orders for these recommended tests are listed in the plan section. The patient has been provided with a written plan.    Health Maintenance   Topic Date Due   • ZOSTER VACCINE (1 of 2) Never done   • INFLUENZA VACCINE  08/01/2021   • LIPID PANEL  05/20/2022   • TDAP/TD VACCINES (2 - Td or Tdap) 05/21/2030          The following  portions of the patient's history were reviewed and updated as appropriate: allergies, current medications, past family history, past medical history, past social history, past surgical history and problem list.    Outpatient Medications Prior to Visit   Medication Sig Dispense Refill   • Apoaequorin (Prevagen) 10 MG capsule Take 1 capsule by mouth Daily.     • aspirin 81 MG EC tablet Take 81 mg by mouth Daily. Three times a week     • cetirizine (zyrTEC) 10 MG tablet Take  by mouth.     • Cholecalciferol (VITAMIN D3) 2000 UNITS capsule Take  by mouth.     • fluticasone (Flonase) 50 MCG/ACT nasal spray 1 spray into the nostril(s) as directed by provider Daily.     • lisinopril (PRINIVIL,ZESTRIL) 10 MG tablet Take 1 tablet by mouth Daily. 90 tablet 3   • lovastatin (MEVACOR) 40 MG tablet Take 1 tablet by mouth Every Night. 90 tablet 3   • omeprazole (priLOSEC) 40 MG capsule TAKE 1 CAPSULE EVERY DAY NEED MD APPOINTMENT FOR REFILLS 90 capsule 2   • therapeutic multivitamin-minerals (THERAGRAN-M) tablet Take  by mouth.     • triamcinolone (KENALOG) 0.1 % ointment Apply 1 application topically to the appropriate area as directed 2 (Two) Times a Day As Needed.     • Avanafil 200 MG tablet Take  by mouth.       No facility-administered medications prior to visit.       Patient Active Problem List   Diagnosis   • FHx: prostate cancer   • Pacemaker   • Peptic ulcer disease   • BPH (benign prostatic hyperplasia)   • Bradycardia   • Erectile dysfunction of organic origin   • Gastroesophageal reflux disease   • Fleming's esophagus without dysplasia   • Nonsmoker   • Obesity, unspecified obesity severity, unspecified obesity type   • HTN (hypertension), benign   • PAF (paroxysmal atrial fibrillation) (CMS/[facility])       Advanced Care Planning:  ACP discussion was declined by the patient. Patient does not have an advance directive, declines further assistance.    Review of Systems   Constitutional: Negative for activity change,  "appetite change, fatigue, fever and unexpected weight change.   HENT: Negative for congestion, ear discharge, ear pain, hearing loss, postnasal drip, rhinorrhea, sinus pressure, tinnitus, trouble swallowing and voice change.    Eyes: Negative for discharge and visual disturbance.   Respiratory: Negative for apnea, cough and shortness of breath.    Cardiovascular: Negative for chest pain, palpitations and leg swelling.   Gastrointestinal: Negative for abdominal pain, blood in stool, constipation and rectal pain.   Endocrine: Negative for cold intolerance, heat intolerance, polydipsia and polyphagia.   Genitourinary: Negative for decreased urine volume, difficulty urinating, frequency, hematuria and urgency.   Musculoskeletal: Negative for arthralgias, back pain, myalgias, neck pain and neck stiffness.   Skin: Negative for color change, rash and wound.   Allergic/Immunologic: Negative for environmental allergies.   Neurological: Negative for dizziness, speech difficulty, weakness, numbness and headaches.   Hematological: Negative for adenopathy. Does not bruise/bleed easily.   Psychiatric/Behavioral: Negative for agitation, confusion, decreased concentration and sleep disturbance. The patient is not nervous/anxious.        Compared to one year ago, the patient feels his physical health is the same.  Compared to one year ago, the patient feels his mental health is the same.    Reviewed chart for potential of high risk medication in the elderly: yes  Reviewed chart for potential of harmful drug interactions in the elderly:yes    Objective         Vitals:    05/24/21 0903   BP: 128/76   BP Location: Left arm   Patient Position: Sitting   Cuff Size: Adult   Pulse: 69   Temp: 98 °F (36.7 °C)   TempSrc: Temporal   SpO2: 99%   Weight: 105 kg (232 lb)   Height: 185.4 cm (73\")   PainSc: 0-No pain       Body mass index is 30.61 kg/m².  Discussed the patient's BMI with him. The BMI is above average; BMI management plan is " completed.    Physical Exam  Vitals and nursing note reviewed.   Constitutional:       Appearance: Normal appearance. He is well-developed. He is obese.   HENT:      Head: Normocephalic and atraumatic.      Right Ear: Hearing, tympanic membrane, ear canal and external ear normal.      Left Ear: Hearing, tympanic membrane, ear canal and external ear normal.      Ears:      Comments: Hearing aids bilaterally.      Nose: Nose normal.      Mouth/Throat:      Lips: Pink.      Mouth: Mucous membranes are moist.      Pharynx: Oropharynx is clear. Uvula midline.   Eyes:      General: Lids are normal. Lids are everted, no foreign bodies appreciated. Vision grossly intact.      Extraocular Movements: Extraocular movements intact.      Conjunctiva/sclera: Conjunctivae normal.      Pupils: Pupils are equal, round, and reactive to light.      Comments: Wearing glasses   Neck:      Thyroid: No thyromegaly.      Vascular: No carotid bruit.   Cardiovascular:      Rate and Rhythm: Normal rate and regular rhythm.      Pulses: Normal pulses.      Heart sounds: Normal heart sounds.   Pulmonary:      Effort: Pulmonary effort is normal.      Breath sounds: Normal breath sounds.   Abdominal:      General: Bowel sounds are normal.      Palpations: Abdomen is soft.      Tenderness: There is no abdominal tenderness.   Musculoskeletal:      Cervical back: Normal range of motion and neck supple.      Right lower leg: No edema.      Left lower leg: No edema.   Lymphadenopathy:      Cervical: No cervical adenopathy.   Skin:     General: Skin is warm and dry.      Capillary Refill: Capillary refill takes less than 2 seconds.   Neurological:      General: No focal deficit present.      Mental Status: He is alert and oriented to person, place, and time.      Deep Tendon Reflexes: Reflexes are normal and symmetric.   Psychiatric:         Attention and Perception: Attention normal.         Mood and Affect: Mood normal.         Speech: Speech normal.          Behavior: Behavior normal. Behavior is cooperative.         Thought Content: Thought content normal.         Cognition and Memory: Cognition and memory normal.         Judgment: Judgment normal.         Lab Results   Component Value Date     (H) 05/20/2021    CHLPL 178 05/20/2021    TRIG 138 05/20/2021    HDL 51 05/20/2021     (H) 05/20/2021    VLDL 24 05/20/2021    HGBA1C 5.40 05/20/2021        Assessment/Plan   Medicare Risks and Personalized Health Plan  CMS Preventative Services Quick Reference  Breast Cancer/Mammogram Screening  Cardiovascular risk  Colon Cancer Screening  Dementia/Memory   Depression/Dysphoria  Diabetic Lab Screening   Fall Risk  Glaucoma Risk  Hearing Problem  Immunizations Discussed/Encouraged (specific immunizations; Shingrix )  Inadequate Social Support, Isolation, Loneliness, Lack of Transportation, Financial Difficulties, or Caregiver Stress   Inactivity/Sedentary  Obesity/Overweight   Osteoporosis Risk  Polypharmacy  Prostate Cancer Screening      The above risks/problems have been discussed with the patient.  Pertinent information has been shared with the patient in the After Visit Summary.  Follow up plans and orders are seen below in the Assessment/Plan Section.    Diagnoses and all orders for this visit:    1. Medicare annual wellness visit, subsequent (Primary)    2. HTN (hypertension), benign    3. Gastroesophageal reflux disease, unspecified whether esophagitis present    4. Pure hypercholesterolemia    5. PAF (paroxysmal atrial fibrillation) (CMS/[facility])    6. Need for shingles vaccine  -     Discontinue: Zoster Vac Recomb Adjuvanted 50 MCG/0.5ML reconstituted suspension; Inject 0.5 mL into the appropriate muscle as directed by prescriber 1 (One) Time for 1 dose.  Dispense: 1 each; Refill: 0  -     Zoster Vac Recomb Adjuvanted 50 MCG/0.5ML reconstituted suspension; Inject 0.5 mL into the appropriate muscle as directed by prescriber 1 (One) Time for 1 dose.   Dispense: 1 each; Refill: 0      Follow Up:  Return in about 6 months (around 11/24/2021).     HTN : controlled with current therapy. No adjustments at this time. Followed by Cardiology and will have follow up appointment in August with Dr. Baird.    GERD: Symptoms are controlled with Prilosec use daily. He was advised on the potential for vitamin b12 deficiency. He is already taking supplement OTC. Continue use of both at this time.    Cholesterol: elevated AST, no adjustment to cholesterol medicine. Would benefit from LDL<70, however, related to liver enzyme elevated, would hold from adjustment. Advised patient with elevated AST, will need to reassess in 1 month. If remains elevated, would reduce cholesterol medicine and advised him on AHA diet guidelines to assist in better cholesterol control. If AST remains elevated after reduction of cholesterol medicine, will need to consider liver ultrasound. Patient is asymptomatic in office today and does not require ultrasound at this time.     Atrial fibrillation is controlled with current therapy and pacemaker. Rate and rhythm is regular in office today and he denies symptoms.     Will send order for shingles vaccine to [facility].     An After Visit Summary and PPPS were given to the patient.

## 2021-05-26 DIAGNOSIS — R00.1 BRADYCARDIA: ICD-10-CM

## 2021-05-26 DIAGNOSIS — Z95.0 PACEMAKER: Primary | ICD-10-CM

## 2021-05-26 PROCEDURE — 93294 REM INTERROG EVL PM/LDLS PM: CPT | Performed by: CLINICAL NURSE SPECIALIST

## 2021-05-26 PROCEDURE — 93296 REM INTERROG EVL PM/IDS: CPT | Performed by: CLINICAL NURSE SPECIALIST

## 2021-07-16 RX ORDER — LISINOPRIL 10 MG/1
TABLET ORAL
Qty: 90 TABLET | Refills: 3 | Status: SHIPPED | OUTPATIENT
Start: 2021-07-16 | End: 2022-06-02 | Stop reason: SDUPTHER

## 2021-07-22 DIAGNOSIS — K21.9 GASTROESOPHAGEAL REFLUX DISEASE: ICD-10-CM

## 2021-07-23 DIAGNOSIS — R79.89 ELEVATED LFTS: Primary | ICD-10-CM

## 2021-07-23 RX ORDER — OMEPRAZOLE 40 MG/1
CAPSULE, DELAYED RELEASE ORAL
Qty: 90 CAPSULE | Refills: 2 | Status: SHIPPED | OUTPATIENT
Start: 2021-07-23 | End: 2022-02-24

## 2021-07-23 RX ORDER — LOVASTATIN 40 MG/1
40 TABLET ORAL NIGHTLY
Qty: 90 TABLET | Refills: 3 | Status: SHIPPED | OUTPATIENT
Start: 2021-07-23 | End: 2022-05-13 | Stop reason: SDUPTHER

## 2021-07-24 LAB
ALT SERPL-CCNC: 29 IU/L (ref 0–44)
AST SERPL-CCNC: 59 IU/L (ref 0–40)

## 2021-07-27 ENCOUNTER — TELEPHONE (OUTPATIENT)
Dept: INTERNAL MEDICINE | Facility: CLINIC | Age: 76
End: 2021-07-27

## 2021-07-27 NOTE — TELEPHONE ENCOUNTER
----- Message from Celestina Pritchard, MARIAN sent at 7/27/2021  7:48 AM CDT -----  Liver function trending down. Continue current treatment plan and avoid excessive use of tylenol. Thank you.

## 2021-08-04 ENCOUNTER — OFFICE VISIT (OUTPATIENT)
Dept: GASTROENTEROLOGY | Facility: CLINIC | Age: 76
End: 2021-08-04

## 2021-08-04 VITALS
DIASTOLIC BLOOD PRESSURE: 72 MMHG | TEMPERATURE: 97.3 F | SYSTOLIC BLOOD PRESSURE: 140 MMHG | OXYGEN SATURATION: 98 % | WEIGHT: 230 LBS | BODY MASS INDEX: 30.48 KG/M2 | HEIGHT: 73 IN | HEART RATE: 75 BPM

## 2021-08-04 DIAGNOSIS — Z78.9 NONSMOKER: ICD-10-CM

## 2021-08-04 DIAGNOSIS — Z86.010 HX OF ADENOMATOUS COLONIC POLYPS: Primary | ICD-10-CM

## 2021-08-04 DIAGNOSIS — I10 HTN (HYPERTENSION), BENIGN: ICD-10-CM

## 2021-08-04 PROBLEM — Z86.0101 HX OF ADENOMATOUS COLONIC POLYPS: Status: ACTIVE | Noted: 2021-08-04

## 2021-08-04 PROCEDURE — 99214 OFFICE O/P EST MOD 30 MIN: CPT | Performed by: CLINICAL NURSE SPECIALIST

## 2021-08-04 NOTE — PROGRESS NOTES
Merlin William Ihnen  1945 8/4/2021  Chief Complaint   Patient presents with   • Colonoscopy     Subjective   HPI  Merlin William Ihnen is a 76 y.o. male who presents as a referral for preventative maintenance. He has no complaints of nausea or vomiting. No change in bowels. No wt loss. No BRBPR. No melena. There is NO family hx for colon cancer. No abdominal pain.  Past Medical History:   Diagnosis Date   • Cancer (CMS/HCC)    • Elevated liver enzymes    • Gastrointestinal stromal tumor (GIST) (CMS/HCC)    • GERD (gastroesophageal reflux disease)    • Pacemaker    • Squamous cell skin cancer      Past Surgical History:   Procedure Laterality Date   • COLON SURGERY     • COLONOSCOPY W/ POLYPECTOMY  07/19/2016    Polyp at 70 cm proximal to anus no changes of adenomatous or hyperplastic polyp identified repeat exam in 5 years   • COLONOSCOPY W/ POLYPECTOMY  04/19/2013    Early Tubular adenoma repeat exam in 3 years   • ENDOSCOPY  07/19/2016    Chronic esophagogastritis mild with Intestinal Metaplasia repeat exam in 3 years   • ENDOSCOPY  04/19/2013    Mild chronic inflammation negative for Intestinal Metaplasia    • ENDOSCOPY N/A 10/2/2019    Focal Intestinal Metaplasia repeat exam in 3 years   • NECK SURGERY     • PACEMAKER IMPLANTATION     • SQUAMOUS CELL CARCINOMA EXCISION       Outpatient Medications Marked as Taking for the 8/4/21 encounter (Office Visit) with Mame Warner APRN   Medication Sig Dispense Refill   • Apoaequorin (Prevagen) 10 MG capsule Take 1 capsule by mouth Daily.     • aspirin 81 MG EC tablet Take 81 mg by mouth Daily. Three times a week     • cetirizine (zyrTEC) 10 MG tablet Take  by mouth.     • Cholecalciferol (VITAMIN D3) 2000 UNITS capsule Take  by mouth.     • fluticasone (Flonase) 50 MCG/ACT nasal spray 1 spray into the nostril(s) as directed by provider Daily.     • lisinopril (PRINIVIL,ZESTRIL) 10 MG tablet TAKE 1 TABLET EVERY DAY 90 tablet 3   • lovastatin (MEVACOR)  40 MG tablet TAKE 1 TABLET BY MOUTH EVERY NIGHT. 90 tablet 3   • omeprazole (priLOSEC) 40 MG capsule TAKE 1 CAPSULE EVERY DAY NEED MD APPOINTMENT FOR REFILLS 90 capsule 2   • therapeutic multivitamin-minerals (THERAGRAN-M) tablet Take  by mouth.     • triamcinolone (KENALOG) 0.1 % ointment Apply 1 application topically to the appropriate area as directed 2 (Two) Times a Day As Needed.       Allergies   Allergen Reactions   • Amoxicillin Itching   • Penicillins Itching     Social History     Socioeconomic History   • Marital status:      Spouse name: Not on file   • Number of children: Not on file   • Years of education: Not on file   • Highest education level: Not on file   Tobacco Use   • Smoking status: Former Smoker     Packs/day: 0.50     Years: 10.00     Pack years: 5.00     Types: Cigarettes   • Smokeless tobacco: Never Used   • Tobacco comment: quit over 50 yrs ago   Substance and Sexual Activity   • Alcohol use: Yes     Comment: 4-5 times weekly beer   • Drug use: No   • Sexual activity: Defer     Family History   Problem Relation Age of Onset   • Colon polyps Neg Hx    • Colon cancer Neg Hx      Health Maintenance   Topic Date Due   • ZOSTER VACCINE (1 of 2) Never done   • INFLUENZA VACCINE  10/01/2021   • LIPID PANEL  05/20/2022   • ANNUAL WELLNESS VISIT  05/24/2022   • COLORECTAL CANCER SCREENING  07/19/2026   • TDAP/TD VACCINES (2 - Td or Tdap) 05/21/2030   • HEPATITIS C SCREENING  Completed   • COVID-19 Vaccine  Completed   • Pneumococcal Vaccine 65+  Completed       REVIEW OF SYSTEMS  General: well appearing, no fever chills or sweats, no unexplained wt loss  HEENT: no acute visual or hearing disturbances  Cardiovascular: No chest pain or palpitations  Pulmonary: No shortness of breath, coughing, wheezing or hemoptysis  : No burning, urgency, hematuria, or dysuria  Musculoskeletal: No joint pain or stiffness  Peripheral: no edema  Skin: No lesions or rashes  Neuro: No dizziness, headaches,  "stroke, syncope  Endocrine: No hot or cold intolerances  Hematological: No blood dyscrasias    Objective   Vitals:    08/04/21 1025   BP: 140/72   Pulse: 75   Temp: 97.3 °F (36.3 °C)   SpO2: 98%   Weight: 104 kg (230 lb)   Height: 185.4 cm (73\")     Body mass index is 30.34 kg/m².  Patient's Body mass index is 30.34 kg/m². indicating that he is obese (BMI >30). Obesity-related health conditions include the following: hypertension. Obesity is unchanged. BMI is is above average; BMI management plan is completed. We discussed portion control and increasing exercise..      PHYSICAL EXAM  General: age appropriate well nourished well appearing, no acute distress  Head: normocephalic and atraumatic  Global assessment-supple  Neck-No JVD noted, no lymphadenopathy  Pulmonary-clear to auscultation bilaterally, normal respiratory effort  Cardiovascular-normal rate and rhythm, normal heart sounds, S1 and S2 noted  Abdomen-soft, non tender, non distended, normal bowel sounds all 4 quadrants, no hepatosplenomegaly noted  Extremities-No clubbing cyanosis or edema  Neuro-Non focal, converses appropriately, awake, alert, oriented    Assessment/Plan     Diagnoses and all orders for this visit:    1. Hx of adenomatous colonic polyps (Primary)  -     polyethylene glycol (GoLYTELY) 236 g solution; Take as directed by office instructions.  Dispense: 4000 mL; Refill: 0  -     Case Request; Standing  -     Follow Anesthesia Guidelines / Standing Orders; Future  -     Obtain Informed Consent; Future  -     Implement Anesthesia Orders Day of Procedure; Standing  -     Obtain Informed Consent; Standing  -     Verify bowel prep was successful; Standing  -     Case Request    2. Nonsmoker    3. HTN (hypertension), benign  Comments:  cont BP medication the day of procedure        COLONOSCOPY WITH ANESTHESIA (N/A)  Body mass index is 30.34 kg/m².    Patient instructions on prep prior to procedure provided to the patient.    All risks, benefits, " alternatives, and indications of colonoscopy procedure have been discussed with the patient. Risks to include perforation of the colon requiring possible surgery or colostomy, risk of bleeding from biopsies or removal of colon tissue, possibility of missing a colon polyp or cancer, or adverse drug reaction.  Benefits to include the diagnosis and management of disease of the colon and rectum. Alternatives to include barium enema, radiographic evaluation, lab testing or no intervention. Pt verbalizes understanding and agrees.     Mame Warner, APRN  2021  10:44 CDT      IF YOU SMOKE OR USE TOBACCO PLEASE READ THE FOLLOWIN minutes reading provided    Why is smoking bad for me?  Smoking increases the risk of heart disease, lung disease, vascular disease, stroke, and cancer.     If you smoke, STOP!    If you would like more information on quitting smoking, please visit the [employer] website: [email or web address]   This link will provide additional resources including the QUIT line and the Beat the Pack support groups.     For more information:    Quit Now [place]  -QUIT-NOW  [email or web address]

## 2021-08-24 ENCOUNTER — OFFICE VISIT (OUTPATIENT)
Dept: CARDIOLOGY CLINIC | Age: 76
End: 2021-08-24
Payer: MEDICARE

## 2021-08-24 VITALS
WEIGHT: 233 LBS | BODY MASS INDEX: 30.88 KG/M2 | HEIGHT: 73 IN | HEART RATE: 62 BPM | SYSTOLIC BLOOD PRESSURE: 130 MMHG | DIASTOLIC BLOOD PRESSURE: 80 MMHG

## 2021-08-24 DIAGNOSIS — R00.1 BRADYCARDIA: Primary | ICD-10-CM

## 2021-08-24 PROCEDURE — G8427 DOCREV CUR MEDS BY ELIG CLIN: HCPCS | Performed by: INTERNAL MEDICINE

## 2021-08-24 PROCEDURE — 99214 OFFICE O/P EST MOD 30 MIN: CPT | Performed by: INTERNAL MEDICINE

## 2021-08-24 PROCEDURE — G8417 CALC BMI ABV UP PARAM F/U: HCPCS | Performed by: INTERNAL MEDICINE

## 2021-08-24 PROCEDURE — 4040F PNEUMOC VAC/ADMIN/RCVD: CPT | Performed by: INTERNAL MEDICINE

## 2021-08-24 PROCEDURE — 1123F ACP DISCUSS/DSCN MKR DOCD: CPT | Performed by: INTERNAL MEDICINE

## 2021-08-24 PROCEDURE — 1036F TOBACCO NON-USER: CPT | Performed by: INTERNAL MEDICINE

## 2021-08-24 PROCEDURE — 93280 PM DEVICE PROGR EVAL DUAL: CPT | Performed by: INTERNAL MEDICINE

## 2021-08-24 NOTE — PROGRESS NOTES
Pacemaker interrogated  Presenting rhythm:  AP/VS, AP 48%,  1.4%  Battey voltage 4 years  Lead status:  Lead impedance within range and stable  Sensing:  P waves 4.00 to 5.60 mV,  R waves 15.68 to 22.40 mV  Thresholds:  Atrial 0.250 V @ 0.4ms, ventricular 1.000 V @ 0.4ms  Observations:  3 monitored mode switch episodes (AT/AF burden <0.1%)  Reprogramming for sensitivity and threshold testing  Next [facility] appointment:  11/24/21

## 2021-08-24 NOTE — PROGRESS NOTES
HISTORY  77-year-old gentleman with a history of past tobacco abuse, dyslipidemia, hypertension, and sinus node dysfunction/pacemaker returns for follow-up. He underwent implantation first in 2003 with a battery change in 2013. He has had episodes of atrial fib in the past with the least one lasting 21 hours. They have been asymptomatic and merely picked up on pacer interrogation. Upon return today he relates no symptoms to suggest dysrhythmia [not cognizant in the past], left ventricular dysfunction, or coronary ischemia. He has been exercising on a regular basis and his lipids have been addressed with values from earlier this month demonstrating an LDL of 103, HDL 51, and triglycerides of 138. He has been vaccinated for COVID-19. PHYSICAL EXAM  On exam he carries 233 pounds in a 6 or 1 inch frame. Pressure is 130/80 with a regular pulse of 62. Normal male balding pattern. EOMs full, sclerae and conjunctiva normal. PERRLA. Mask in place. Trachea midline with no neck masses. Assessment of internal jugular veins reveals no elevation of central venous pressure at 45 degrees. Carotid pulses normal without delay or bruit. Thyroid normal to palpation. Pacer pocket well-healed. Chest exam reveals normal respiratory effort, no abnormal breath sounds and normal expiratory phase. No skin lesions seen. PMI normal. S1, S2 normal without murmur or andre or click. Normal bowel sounds without palpable mass or bruit. No clubbing or acrocyanosis. No significant lower extremity edema or signs of venous insufficiency. General motor strength appears to be within normal limits. Normal range of motion with normal gait. Alert, oriented x 3, memory and cognition normal as reflected by history and conversation. EKG reveals sinus rhythm without abnormalities and pacer interrogation demonstrates no atrial fibrillation with normal pacer function. ASSESSMENT/PLAN:   1.   Paroxysmal atrial fibrillation -no evidence of recurrence. Discussed at some length the need for anticoagulant coverage should he develop recurrent A. Fib. 2.  Lipid status -marginal control. Continue lovastatin  3. Hypertension -marginal control today but I am advised that he normally runs within acceptable range. Continue lisinopril  4.   Pandemic response -appropriate/vaccinated

## 2021-09-09 ENCOUNTER — ANESTHESIA (OUTPATIENT)
Dept: GASTROENTEROLOGY | Facility: HOSPITAL | Age: 76
End: 2021-09-09

## 2021-09-09 ENCOUNTER — HOSPITAL ENCOUNTER (OUTPATIENT)
Facility: HOSPITAL | Age: 76
Setting detail: HOSPITAL OUTPATIENT SURGERY
Discharge: HOME | End: 2021-09-09
Attending: INTERNAL MEDICINE | Admitting: INTERNAL MEDICINE

## 2021-09-09 ENCOUNTER — ANESTHESIA EVENT (OUTPATIENT)
Dept: GASTROENTEROLOGY | Facility: HOSPITAL | Age: 76
End: 2021-09-09

## 2021-09-09 VITALS
SYSTOLIC BLOOD PRESSURE: 131 MMHG | RESPIRATION RATE: 21 BRPM | HEIGHT: 73 IN | WEIGHT: 229 LBS | DIASTOLIC BLOOD PRESSURE: 93 MMHG | HEART RATE: 78 BPM | TEMPERATURE: 96.3 F | BODY MASS INDEX: 30.35 KG/M2 | OXYGEN SATURATION: 95 %

## 2021-09-09 DIAGNOSIS — Z86.010 HX OF ADENOMATOUS COLONIC POLYPS: ICD-10-CM

## 2021-09-09 PROCEDURE — G0105 COLORECTAL SCRN; HI RISK IND: HCPCS | Performed by: INTERNAL MEDICINE

## 2021-09-09 PROCEDURE — 25010000002 PROPOFOL 10 MG/ML EMULSION: Performed by: NURSE ANESTHETIST, CERTIFIED REGISTERED

## 2021-09-09 RX ORDER — SODIUM CHLORIDE 0.9 % (FLUSH) 0.9 %
10 SYRINGE (ML) INJECTION AS NEEDED
Status: DISCONTINUED | OUTPATIENT
Start: 2021-09-09 | End: 2021-09-09 | Stop reason: HOSPADM

## 2021-09-09 RX ORDER — SODIUM CHLORIDE 9 MG/ML
500 INJECTION, SOLUTION INTRAVENOUS CONTINUOUS PRN
Status: DISCONTINUED | OUTPATIENT
Start: 2021-09-09 | End: 2021-09-09 | Stop reason: HOSPADM

## 2021-09-09 RX ORDER — LIDOCAINE HYDROCHLORIDE 10 MG/ML
0.5 INJECTION, SOLUTION EPIDURAL; INFILTRATION; INTRACAUDAL; PERINEURAL ONCE AS NEEDED
Status: CANCELLED | OUTPATIENT
Start: 2021-09-09

## 2021-09-09 RX ORDER — LIDOCAINE HYDROCHLORIDE 20 MG/ML
INJECTION, SOLUTION EPIDURAL; INFILTRATION; INTRACAUDAL; PERINEURAL AS NEEDED
Status: DISCONTINUED | OUTPATIENT
Start: 2021-09-09 | End: 2021-09-09 | Stop reason: SURG

## 2021-09-09 RX ORDER — PROPOFOL 10 MG/ML
VIAL (ML) INTRAVENOUS AS NEEDED
Status: DISCONTINUED | OUTPATIENT
Start: 2021-09-09 | End: 2021-09-09 | Stop reason: SURG

## 2021-09-09 RX ADMIN — SODIUM CHLORIDE 500 ML: 9 INJECTION, SOLUTION INTRAVENOUS at 09:48

## 2021-09-09 RX ADMIN — PROPOFOL 250 MG: 10 INJECTION, EMULSION INTRAVENOUS at 11:01

## 2021-09-09 RX ADMIN — LIDOCAINE HYDROCHLORIDE 40 MG: 20 INJECTION, SOLUTION EPIDURAL; INFILTRATION; INTRACAUDAL; PERINEURAL at 11:01

## 2021-09-09 NOTE — H&P
[facility]  Pre Procedure History & Physical    Chief Complaint:   History of polyps    Subjective     HPI:   Here for colonoscopy.  History of polyps    Past Medical History:   Past Medical History:   Diagnosis Date   • Atrial fibrillation (CMS/HCC)    • Cancer (CMS/HCC)    • Elevated liver enzymes    • Gastrointestinal stromal tumor (GIST) (CMS/HCC)    • GERD (gastroesophageal reflux disease)    • Pacemaker    • Squamous cell skin cancer        Past Surgical History:  Past Surgical History:   Procedure Laterality Date   • COLON SURGERY     • COLONOSCOPY W/ POLYPECTOMY  07/19/2016    Polyp at 68 cm proximal to anus no changes of adenomatous or hyperplastic polyp identified repeat exam in 5 years   • COLONOSCOPY W/ POLYPECTOMY  04/19/2013    Early Tubular adenoma repeat exam in 3 years   • ENDOSCOPY  07/19/2016    Chronic esophagogastritis mild with Intestinal Metaplasia repeat exam in 3 years   • ENDOSCOPY  04/19/2013    Mild chronic inflammation negative for Intestinal Metaplasia    • ENDOSCOPY N/A 10/2/2019    Focal Intestinal Metaplasia repeat exam in 3 years   • NECK SURGERY     • PACEMAKER IMPLANTATION     • SQUAMOUS CELL CARCINOMA EXCISION         Family History:  Family History   Problem Relation Age of Onset   • Heart disease Mother    • No Known Problems Father    • Colon polyps Neg Hx    • Colon cancer Neg Hx        Social History:   reports that he has quit smoking. His smoking use included cigarettes. He has a 5.00 pack-year smoking history. He has never used smokeless tobacco. He reports current alcohol use. He reports that he does not use drugs.    Medications:   Prior to Admission medications    Medication Sig Start Date End Date Taking? Authorizing Provider   aspirin 81 MG EC tablet Take 81 mg by mouth Daily. Three times a week   Yes Provider, MD Ángel   cetirizine (zyrTEC) 10 MG tablet Take  by mouth.   Yes Emergency, Nurse Rachel, RN   lisinopril (PRINIVIL,ZESTRIL) 10 MG  "tablet TAKE 1 TABLET EVERY DAY 7/16/21  Yes Ileana Saunders APRN   lovastatin (MEVACOR) 40 MG tablet TAKE 1 TABLET BY MOUTH EVERY NIGHT. 7/23/21  Yes Ileana Saunders APRN   omeprazole (priLOSEC) 40 MG capsule TAKE 1 CAPSULE EVERY DAY NEED MD APPOINTMENT FOR REFILLS 7/23/21  Yes Mame Warner APRN   triamcinolone (KENALOG) 0.1 % ointment Apply 1 application topically to the appropriate area as directed 2 (Two) Times a Day As Needed. 5/12/21  Yes Provider, MD Ángel   Apoaequorin (Prevagen) 10 MG capsule Take 1 capsule by mouth Daily.    Provider, MD Ángel   Cholecalciferol (VITAMIN D3) 2000 UNITS capsule Take  by mouth.    Emergency, Nurse Rachel, RN   fluticasone (Flonase) 50 MCG/ACT nasal spray 1 spray into the nostril(s) as directed by provider Daily. 1/24/18   Provider, MD Ángel   therapeutic multivitamin-minerals (THERAGRAN-M) tablet Take  by mouth.    Emergency, Nurse Epic, RN   polyethylene glycol (GoLYTELY) 236 g solution Take as directed by office instructions. 8/4/21 9/9/21  Mame Warner APRN       Allergies:  Amoxicillin and Penicillins    Objective     Blood pressure 150/90, pulse 73, temperature 96.3 °F (35.7 °C), temperature source Temporal, resp. rate 20, height 185.4 cm (73\"), weight 104 kg (229 lb), SpO2 97 %.    Physical Exam   Constitutional: Pt is oriented to person, place, and in no distress.   HENT: Mouth/Throat: Oropharynx is clear.   Cardiovascular: Normal rate, regular rhythm.    Pulmonary/Chest: Effort normal. No respiratory distress. No  wheezes.   Abdominal: Soft. Non-distended.  Skin: Skin is warm and dry.   Psychiatric: Mood, memory, affect and judgment appear normal.     Assessment/Plan     Diagnosis:  History of polyps    Anticipated Surgical Procedure:    Proceed with colonoscopy as scheduled    The following major R/B/A were discussed with the patient, however the list is not all inclusive . Risk:  Bleeding (immediate and delayed), perforation (rupture " or tear), reaction to medication, missed lesion/cancer, pain during the procedure, infection, need for surgery, need for ostomy, need for mechanical ventilation (breathing machine), death.  Benefits: removal of polyp/tissue, burn/clip/or inject to stop bleeding, removal of foreign body, dilate any stricture.  Alternatives: Xray or CT, surgery, do nothing with associated risk   The patient was given time to ask question and received explanation, and agrees to proceed as per History and Physical.   No guarantee given or expressed.    EMR Dragon/transcription disclaimer: Much of this encounter note is an electronic transcription/translation of spoken language to printed text.  The electronic translation of spoken language may permit erroneous, or at times, nonsensical words or phrases to be inadvertently transcribed.  Although I have reviewed the note for such errors, some may still exist.    Keyon Ann MD  10:59 CDT  9/9/2021

## 2021-09-09 NOTE — ANESTHESIA PREPROCEDURE EVALUATION
Anesthesia Evaluation     no history of anesthetic complications:  NPO Solid Status: > 8 hours  NPO Liquid Status: > 2 hours           Airway   Mallampati: I  TM distance: >3 FB  Neck ROM: full  No difficulty expected  Dental      Pulmonary    Cardiovascular   Exercise tolerance: good (4-7 METS)    (+) pacemaker pacemaker, hypertension, dysrhythmias Atrial Fib,     ROS comment: Interrogation medtronic  Presenting rhythm: AS VS, AP 46.4%,  1.7%   Adequate  battery    Neuro/Psych  (-) seizures, TIA, CVA  GI/Hepatic/Renal/Endo    (+) obesity,  GERD,    (-) liver disease, no renal disease, diabetes    Musculoskeletal     Abdominal    Substance History      OB/GYN          Other      history of cancer (GIST tumor, s/p surgery) remission                    Anesthesia Plan    ASA 3     MAC     intravenous induction     Anesthetic plan, all risks, benefits, and alternatives have been provided, discussed and informed consent has been obtained with: patient.

## 2021-09-09 NOTE — ANESTHESIA POSTPROCEDURE EVALUATION
Patient: Merlin William Ihnen    Procedure Summary     Date: 09/09/21 Room / Location: [facility] ENDOSCOPY 4 /  [facility]    Anesthesia Start: 1058 Anesthesia Stop: 1113    Procedure: COLONOSCOPY WITH ANESTHESIA (N/A ) Diagnosis:       Hx of adenomatous colonic polyps      (Hx of adenomatous colonic polyps [Z86.010])    Surgeons: Keyon Ann MD Provider: Pablo Chi CRNA    Anesthesia Type: MAC ASA Status: 3          Anesthesia Type: MAC    Vitals  Vitals Value Taken Time   /82 09/09/21 1121   Temp     Pulse 78 09/09/21 1125   Resp 20 09/09/21 1120   SpO2 95 % 09/09/21 1125   Vitals shown include unvalidated device data.        Post Anesthesia Care and Evaluation    Patient location during evaluation: PHASE II  Patient participation: complete - patient participated  Level of consciousness: awake  Pain management: adequate  Airway patency: patent  Anesthetic complications: No anesthetic complications  PONV Status: none  Cardiovascular status: acceptable  Respiratory status: acceptable  Hydration status: acceptable

## 2021-09-12 ENCOUNTER — TELEPHONE (OUTPATIENT)
Dept: GASTROENTEROLOGY | Facility: CLINIC | Age: 76
End: 2021-09-12

## 2021-09-22 ENCOUNTER — TELEPHONE (OUTPATIENT)
Dept: CARDIOLOGY CLINIC | Age: 76
End: 2021-09-22

## 2021-09-22 NOTE — TELEPHONE ENCOUNTER
Date: 9/28/21    Cardiologist: Marlon Pang    Procedure: cataract surgery    Surgeon: Андрей Vela    Last Office Visit: 8/24/21  Reason for office visit and medical concerns addressed at this office visit: bradycardia, htn, hyperlipidemia, pacemaker    Testing Performed and Date of Service:  none    RCRI = 0.4   METs 4    Current Medications: zinc, prevagen, lisinopril, lovastatin, aspirin, omeprazole, zyrtec,     Is the patient currently taking an anticoagulant?  If so, what is the diagnosis the patient has been given to warrant the need for the anticoagulant? none    Additional Notes: requesting cardiac clearance prior to procedure

## 2021-11-29 DIAGNOSIS — R00.1 BRADYCARDIA: ICD-10-CM

## 2021-11-29 DIAGNOSIS — Z95.0 PACEMAKER: Primary | ICD-10-CM

## 2021-11-30 ENCOUNTER — OFFICE VISIT (OUTPATIENT)
Dept: INTERNAL MEDICINE | Facility: CLINIC | Age: 76
End: 2021-11-30

## 2021-11-30 VITALS
BODY MASS INDEX: 29.95 KG/M2 | DIASTOLIC BLOOD PRESSURE: 78 MMHG | TEMPERATURE: 97.1 F | WEIGHT: 226 LBS | HEART RATE: 85 BPM | SYSTOLIC BLOOD PRESSURE: 132 MMHG | OXYGEN SATURATION: 99 % | HEIGHT: 73 IN

## 2021-11-30 DIAGNOSIS — R74.8 ELEVATED LIVER ENZYMES: ICD-10-CM

## 2021-11-30 DIAGNOSIS — I10 HTN (HYPERTENSION), BENIGN: ICD-10-CM

## 2021-11-30 DIAGNOSIS — E78.00 HIGH CHOLESTEROL: Primary | ICD-10-CM

## 2021-11-30 PROBLEM — Z13.31 NEGATIVE DEPRESSION SCREENING: Status: ACTIVE | Noted: 2021-11-30

## 2021-11-30 PROBLEM — K43.2 INCISIONAL HERNIA, WITHOUT OBSTRUCTION OR GANGRENE: Status: ACTIVE | Noted: 2021-11-30

## 2021-11-30 PROBLEM — Z91.81 AT LOW RISK FOR FALL: Status: ACTIVE | Noted: 2021-11-30

## 2021-11-30 PROBLEM — J30.9 ALLERGIC RHINITIS, MILD: Status: ACTIVE | Noted: 2021-11-30

## 2021-11-30 PROBLEM — R73.01 ELEVATED FASTING GLUCOSE: Status: ACTIVE | Noted: 2021-11-30

## 2021-11-30 PROCEDURE — 99213 OFFICE O/P EST LOW 20 MIN: CPT | Performed by: NURSE PRACTITIONER

## 2021-11-30 PROCEDURE — G0008 ADMIN INFLUENZA VIRUS VAC: HCPCS | Performed by: NURSE PRACTITIONER

## 2021-11-30 PROCEDURE — 90662 IIV NO PRSV INCREASED AG IM: CPT | Performed by: NURSE PRACTITIONER

## 2021-11-30 PROCEDURE — 93296 REM INTERROG EVL PM/IDS: CPT | Performed by: CLINICAL NURSE SPECIALIST

## 2021-11-30 PROCEDURE — 93294 REM INTERROG EVL PM/LDLS PM: CPT | Performed by: CLINICAL NURSE SPECIALIST

## 2021-11-30 NOTE — PATIENT INSTRUCTIONS
Liver Function Tests  Why am I having this test?  Liver function tests are done to see how well your liver is working. The proteins and enzymes measured in the test can alert your health care provider to inflammation, damage, or disease in your liver. It is common to have liver function tests:  · When you are taking certain medicines.  · If you have liver disease.  · If you drink a lot of alcohol.  · When you are not feeling well.  · When you have other conditions that may affect your liver.  · During annual physical exams.  · If you have symptoms such as yellowing of the skin (jaundice), abdominal pain, or nausea and vomiting.  What is being tested?  These tests measure various substances in your blood. This may include:  · Alanine transaminase (ALT). This is an enzyme in the liver.  · Aspartate transaminase (AST). This is an enzyme in the liver, heart, and muscles.  · Alkaline phosphatase (ALP). This is a protein in the liver, bile ducts, bone, and other body tissues.  · Total bilirubin. This is a yellow pigment in bile.  · Albumin. This is a protein in the liver.  · Prothrombin time and international normalized ratio (PT and INR). PT measures the time it takes for your blood to clot. INR is a calculation of blood clotting time based on your PT result. It is also calculated based on normal ranges defined by the lab that processed your test.  · Total protein. This includes two proteins, albumin and globulin, found in the blood.  What kind of sample is taken?    A blood sample is required for this test. It is usually collected by inserting a needle into a blood vessel.  How do I prepare for this test?  How you prepare will depend on which tests are being done and the reason for doing them. You may need to:  · Avoid eating for 4-6 hours before the test, or as told by your health care provider.  · Stop taking certain medicines before your blood test, as told by your health care provider.  Tell a health care provider  about:  · All medicines you are taking, including vitamins, herbs, eye drops, creams, and over-the-counter medicines.  · Any medical conditions you have.  · Whether you are pregnant or may be pregnant.  How are the results reported?  Your test results will be reported as values. Your health care provider will compare your results to normal ranges that were established after testing a large group of people (reference ranges). Reference ranges may vary among labs and hospitals. For the substances measured in liver function tests, common reference ranges are:  ALT  · Infant: 10-40 international units/L.  · Child or adult: 4-36 international units/L at 37°C or 4-36 units/L (SI units).  · Reference ranges may be higher for older adults.  AST  · [place] 0-5 days old:  units/L.  · Child younger than 3 years old: 15-60 units/L.  · 3-6 years old: 15-50 units/L.  · 6-12 years old: 10-50 units/L.  · 12-18 years old: 10-40 units/L.  · Adult: 0-35 units/L or 0-0.58 microkatals/L (SI units).  · Reference ranges may be higher for older adults.  ALP  · Child younger than 2 years old:  units/L.  · 2-8 years old:  units/L.  · 9-15 years old:  units/L.  · 16-21 years old:  units/L.  · Adult:  units/L or 0.5-2.0 microkatals/L (SI units).  · Reference ranges may be higher for older adults.  Total bilirubin  · : 1.0-12.0 mg/dL or 17.1-205 micromoles/L (SI units).  · Child or adult: 0.3-1.0 mg/dL or 5.1-17 micromoles/L.  Albumin  · Premature infant: 3.0-4.2 g/dL.  · [place]: 3.5-5.4 g/dL.  · Infant: 4.4-5.4 g/dL.  · Child: 4.0-5.9 g/dL.  · Adult: 3.5-5.0 g/dL or 35-50 g/L (SI units).  PT  · 11.0-12.5 seconds; 85%-100%.  INR  · 0.8-1.1.  Total protein  · Premature infant: 4.2-7.6 g/dL.  · : 4.6-7.4 g/dL.  · Infant: 6.0-6.7 g/dL.  · Child: 6.2-8.0 g/dL.  · Adult: 6.4-8.3 g/dL or 64-83 g/L (SI units).  What do the results mean?  Results that are within the reference ranges are considered  normal. For each substance measured, results outside the reference range can indicate various health issues.  ALT  · Levels above the normal range may indicate liver disease.  AST  · Levels above the normal range may indicate liver disease. Sometimes levels also increase after burns, surgery, heart attack, muscle damage, or seizure.  ALP  · Levels above the normal range may be seen in biliary obstruction, liver diseases, bone disease, thyroid disease, tumors, fractures, leukemia, lymphoma, or several other conditions. People with blood type O or B may show higher levels after a fatty meal.  · Levels below the normal range may indicate bone and teeth conditions, malnutrition, protein deficiency, or Jordon's disease.  Total bilirubin  · Levels above the normal range may indicate problems with the liver, gallbladder, or bile ducts.  Albumin  · Levels above the normal range may indicate dehydration. They may also be caused by a diet that is high in protein.  · Levels below the normal range may indicate kidney disease, liver disease, or malabsorption of nutrients.  PT and INR  · Levels above the normal range mean that your blood is clotting slower than normal. This may be due to blood disorders, liver disorders, or low levels of vitamin K.  Total protein  · Levels above the normal range may be due to infection or other diseases.  · Levels below the normal range may be due to an immune system disorder, bleeding, burns, kidney disorder, liver disease, trouble absorbing or getting nutrients, or other conditions that affect the intestines.  Talk with your health care provider about what your results mean.  Questions to ask your health care provider  Ask your health care provider, or the department that is doing the test:  · When will my results be ready?  · How will I get my results?  · What are my treatment options?  · What other tests do I need?  · What are my next steps?  Summary  · Liver function tests are done to see  how well your liver is working.  · These tests measure various proteins and enzymes in your blood. The results can alert your health care provider to inflammation, damage, or disease in your liver.  · Talk with your health care provider about what your results mean.  This information is not intended to replace advice given to you by your health care provider. Make sure you discuss any questions you have with your health care provider.  Document Revised: 08/07/2019 Document Reviewed: 10/02/2018  [employer] Patient Education © 2021 [employer]  Preventing High Cholesterol  Cholesterol is a white, waxy substance similar to fat that the human body needs to help build cells. The liver makes all the cholesterol that a person's body needs. Having high cholesterol (hypercholesterolemia) increases your risk for heart disease and stroke. Extra or excess cholesterol comes from the food that you eat.  High cholesterol can often be prevented with diet and lifestyle changes. If you already have high cholesterol, you can control it with diet, lifestyle changes, and medicines.  How can high cholesterol affect me?  If you have high cholesterol, fatty deposits (plaques) may build up on the walls of your blood vessels. The blood vessels that carry blood away from your heart are called arteries. Plaques make the arteries narrower and stiffer. This in turn can:  · Restrict or block blood flow and cause blood clots to form.  · Increase your risk for heart attack and stroke.  What can increase my risk for high cholesterol?  This condition is more likely to develop in people who:  · Eat foods that are high in saturated fat or cholesterol. Saturated fat is mostly found in foods that come from animal sources.  · Are overweight.  · Are not getting enough exercise.  · Have a family history of high cholesterol (familial hypercholesterolemia).  What actions can I take to prevent this?  Nutrition    · Eat less saturated fat.  · Avoid trans fats  (partially hydrogenated oils). These are often found in margarine and in some baked goods, fried foods, and snacks bought in packages.  · Avoid precooked or cured meat, such as acosta, sausages, or meat loaves.  · Avoid foods and drinks that have added sugars.  · Eat more fruits, vegetables, and whole grains.  · Choose healthy sources of protein, such as fish, poultry, lean cuts of red meat, beans, peas, lentils, and nuts.  · Choose healthy sources of fat, such as:  ? Nuts.  ? Vegetable oils, especially olive oil.  ? Fish that have healthy fats, such as omega-3 fatty acids. These fish include mackerel or salmon.    Lifestyle  · Lose weight if you are overweight. Maintaining a healthy body mass index (BMI) can help prevent or control high cholesterol. It can also lower your risk for diabetes and high blood pressure. Ask your health care provider to help you with a diet and exercise plan to lose weight safely.  · Do not use any products that contain nicotine or tobacco, such as cigarettes, e-cigarettes, and chewing tobacco. If you need help quitting, ask your health care provider.  Alcohol use  · Do not drink alcohol if:  ? Your health care provider tells you not to drink.  ? You are pregnant, may be pregnant, or are planning to become pregnant.  · If you drink alcohol:  ? Limit how much you use to:  § 0-1 drink a day for women.  § 0-2 drinks a day for men.  ? Be aware of how much alcohol is in your drink. In the [place] one drink equals one 12 oz bottle of beer (355 mL), one 5 oz glass of wine (148 mL), or one 1½ oz glass of hard liquor (44 mL).  Activity    · Get enough exercise. Do exercises as told by your health care provider.  · Each week, do at least 150 minutes of exercise that takes a medium level of effort (moderate-intensity exercise). This kind of exercise:  ? Makes your heart beat faster while allowing you to still be able to talk.  ? Can be done in short sessions several times a day or longer sessions a few  times a week. For example, on 5 days each week, you could walk fast or ride your bike 3 times a day for 10 minutes each time.    Medicines  · Your health care provider may recommend medicines to help lower cholesterol. This may be a medicine to lower the amount of cholesterol that your liver makes. You may need medicine if:  ? Diet and lifestyle changes have not lowered your cholesterol enough.  ? You have high cholesterol and other risk factors for heart disease or stroke.  · Take over-the-counter and prescription medicines only as told by your health care provider.  General information  · Manage your risk factors for high cholesterol. Talk with your health care provider about all your risk factors and how to lower your risk.  · Manage other conditions that you have, such as diabetes or high blood pressure (hypertension).  · Have blood tests to check your cholesterol levels at regular points in time as told by your health care provider.  · Keep all follow-up visits as told by your health care provider. This is important.  Where to find more information  · American Heart Association: www.heart.org  · National Heart, Lung, and Blood [place]: www.nhlbi.nih.gov  Summary  · High cholesterol increases your risk for heart disease and stroke. By keeping your cholesterol level low, you can reduce your risk for these conditions.  · High cholesterol can often be prevented with diet and lifestyle changes.  · Work with your health care provider to manage your risk factors, and have your blood tested regularly.  This information is not intended to replace advice given to you by your health care provider. Make sure you discuss any questions you have with your health care provider.  Document Revised: 09/29/2020 Document Reviewed: 09/29/2020  [employer] Patient Education © 2021 [employer]

## 2021-11-30 NOTE — PROGRESS NOTES
SHIFT REPORT RECEIVED FROM GORDO CHILDRESS.  ASSESSMENT COMPLETED.  PT IN CRIB,
RESTING COMFORTABLY, DOES NOT APPEAR TO BE IN ANY DISTRESS.  RESPIRATIONS
EVEN AND UNLABORED, LUNGS SOUND SLIGHTLY COARSE AT THIS TIME, NO COUGHING
NOTED PER PT'S MOTHER.  VAPOTHERM IN PLACE AT 4L @28%.  HR REGULAR.  BOWEL
TONES ACTIVE.  SKIN GROSSLY INTACT.  IV CURRENTLY INFUSING ANTIBIOTIC, APPEARS
INTACT.  VITAL SIGNS STABLE, AFEBRILE AT THIS TIME. Subjective   Merlin William Ihnen is a 76 y.o. male.   Chief Complaint   Patient presents with   • Hyperlipidemia   • Hypertension       Merlin is a pleasant 75 yo male who present for hypertension and hyperlipidemia follow up. He also has chronic elevated AST. Patient denies abdominal pain, change in bowel habits, indigestion, or history of liver mass/cyst. He denies overuse of tylenol or NSAIDs. He reports casual alcohol use. He denies no more than 2 drinks in a day and denies regular use. He admit to poor eating habits and slightly elevated LDL compared to 6 months earlier. Patient has a history of stomach cancer and recent EGD negative for abnormalities. Colonoscopy completed without significant findings.     Hyperlipidemia  This is a chronic problem. The current episode started more than 1 year ago. The problem is uncontrolled. Recent lipid tests were reviewed and are high. Exacerbating diseases include liver disease and obesity. Factors aggravating his hyperlipidemia include fatty foods. Pertinent negatives include no chest pain or shortness of breath. Current antihyperlipidemic treatment includes exercise, diet change and statins. The current treatment provides moderate improvement of lipids. Compliance problems include adherence to diet and adherence to exercise.  Risk factors for coronary artery disease include hypertension, male sex, a sedentary lifestyle and dyslipidemia.   Hypertension  This is a chronic problem. The current episode started more than 1 year ago. The problem has been waxing and waning since onset. The problem is controlled. Pertinent negatives include no blurred vision, chest pain, neck pain, palpitations or shortness of breath. Current antihypertension treatment includes ACE inhibitors. The current treatment provides moderate improvement. Compliance problems include exercise and diet.         The following portions of the patient's history were reviewed and updated as appropriate:  allergies, current medications, past family history, past medical history, past social history, past surgical history and problem list.    Review of Systems   Constitutional: Negative for activity change, appetite change, fatigue, fever, unexpected weight gain and unexpected weight loss.   HENT: Negative for swollen glands, trouble swallowing and voice change.    Eyes: Negative for blurred vision and visual disturbance.   Respiratory: Negative for cough and shortness of breath.    Cardiovascular: Negative for chest pain, palpitations and leg swelling.   Gastrointestinal: Negative for abdominal pain, constipation, diarrhea, nausea, vomiting and indigestion.   Endocrine: Negative for cold intolerance, heat intolerance, polydipsia and polyphagia.   Genitourinary: Negative for dysuria and frequency.   Musculoskeletal: Negative for arthralgias, back pain, joint swelling and neck pain.   Skin: Negative for color change, rash and skin lesions.   Neurological: Negative for dizziness, weakness, headache, memory problem and confusion.   Hematological: Does not bruise/bleed easily.   Psychiatric/Behavioral: Negative for agitation, hallucinations and suicidal ideas. The patient is not nervous/anxious.        Objective   Past Medical History:   Diagnosis Date   • Atrial fibrillation (HCC)    • Cancer (HCC)    • Elevated liver enzymes    • Gastrointestinal stromal tumor (GIST) (HCC)    • GERD (gastroesophageal reflux disease)    • Pacemaker    • Squamous cell skin cancer       Past Surgical History:   Procedure Laterality Date   • COLON SURGERY     • COLONOSCOPY N/A 9/9/2021    Procedure: COLONOSCOPY WITH ANESTHESIA;  Surgeon: Keyon Ann MD;  Location: [facility] ENDOSCOPY;  Service: Gastroenterology;  Laterality: N/A;  preop; hx of polyps  postop; diverticulosis  PCP Celestina Pritchard    • COLONOSCOPY W/ POLYPECTOMY  07/19/2016    Polyp at 70 cm proximal to anus no changes of adenomatous or hyperplastic polyp identified repeat  exam in 5 years   • COLONOSCOPY W/ POLYPECTOMY  04/19/2013    Early Tubular adenoma repeat exam in 3 years   • ENDOSCOPY  07/19/2016    Chronic esophagogastritis mild with Intestinal Metaplasia repeat exam in 3 years   • ENDOSCOPY  04/19/2013    Mild chronic inflammation negative for Intestinal Metaplasia    • ENDOSCOPY N/A 10/2/2019    Focal Intestinal Metaplasia repeat exam in 3 years   • NECK SURGERY     • PACEMAKER IMPLANTATION     • SQUAMOUS CELL CARCINOMA EXCISION          Current Outpatient Medications:   •  Apoaequorin (Prevagen) 10 MG capsule, Take 1 capsule by mouth Daily., Disp: , Rfl:   •  aspirin 81 MG EC tablet, Take 81 mg by mouth Daily. Three times a week, Disp: , Rfl:   •  cetirizine (zyrTEC) 10 MG tablet, Take  by mouth., Disp: , Rfl:   •  Cholecalciferol (VITAMIN D3) 2000 UNITS capsule, Take  by mouth., Disp: , Rfl:   •  fluticasone (Flonase) 50 MCG/ACT nasal spray, 1 spray into the nostril(s) as directed by provider Daily., Disp: , Rfl:   •  lisinopril (PRINIVIL,ZESTRIL) 10 MG tablet, TAKE 1 TABLET EVERY DAY, Disp: 90 tablet, Rfl: 3  •  lovastatin (MEVACOR) 40 MG tablet, TAKE 1 TABLET BY MOUTH EVERY NIGHT., Disp: 90 tablet, Rfl: 3  •  omeprazole (priLOSEC) 40 MG capsule, TAKE 1 CAPSULE EVERY DAY NEED MD APPOINTMENT FOR REFILLS, Disp: 90 capsule, Rfl: 2  •  therapeutic multivitamin-minerals (THERAGRAN-M) tablet, Take  by mouth., Disp: , Rfl:   •  triamcinolone (KENALOG) 0.1 % ointment, Apply 1 application topically to the appropriate area as directed 2 (Two) Times a Day As Needed., Disp: , Rfl:       Vitals:    11/30/21 0957   BP: 132/78   Pulse: 85   Temp: 97.1 °F (36.2 °C)   SpO2: 99%         11/30/21 0957   Weight: 103 kg (226 lb)       Body mass index is 29.82 kg/m².    Physical Exam  Vitals and nursing note reviewed.   Constitutional:       Appearance: Normal appearance. He is well-developed, well-groomed and overweight.   HENT:      Head: Normocephalic and atraumatic.      Right Ear:  Hearing and external ear normal.      Left Ear: Hearing and external ear normal.      Nose: Nose normal.      Mouth/Throat:      Lips: Pink.      Mouth: Mucous membranes are moist.   Eyes:      General: Lids are normal. Lids are everted, no foreign bodies appreciated. Vision grossly intact.      Extraocular Movements: Extraocular movements intact.      Conjunctiva/sclera: Conjunctivae normal.      Pupils: Pupils are equal, round, and reactive to light.   Neck:      Thyroid: No thyromegaly.      Vascular: No carotid bruit.   Cardiovascular:      Rate and Rhythm: Normal rate and regular rhythm.      Pulses: Normal pulses.      Heart sounds: Normal heart sounds.      Comments: pacemaker  Pulmonary:      Effort: Pulmonary effort is normal.      Breath sounds: Normal breath sounds.   Abdominal:      General: Abdomen is protuberant. Bowel sounds are normal.      Palpations: Abdomen is soft. There is no hepatomegaly, splenomegaly, mass or pulsatile mass.      Tenderness: There is no abdominal tenderness. There is no right CVA tenderness, left CVA tenderness, guarding or rebound.   Musculoskeletal:      Cervical back: Normal range of motion and neck supple.      Right lower leg: No edema.      Left lower leg: No edema.   Lymphadenopathy:      Head:      Right side of head: No submental, submandibular, tonsillar, preauricular, posterior auricular or occipital adenopathy.      Left side of head: No submental, submandibular, tonsillar, preauricular, posterior auricular or occipital adenopathy.      Cervical: No cervical adenopathy.   Skin:     General: Skin is warm and dry.      Capillary Refill: Capillary refill takes less than 2 seconds.   Neurological:      General: No focal deficit present.      Mental Status: He is alert and oriented to person, place, and time.      Deep Tendon Reflexes: Reflexes are normal and symmetric.   Psychiatric:         Attention and Perception: Attention normal.         Mood and Affect: Mood  normal.         Speech: Speech normal.         Behavior: Behavior normal. Behavior is cooperative.         Thought Content: Thought content normal.         Assessment/Plan   Diagnoses and all orders for this visit:    1. High cholesterol (Primary)    2. HTN (hypertension), benign    3. Elevated liver enzymes    Other orders  -     Fluzone High-Dose 65+yrs      Flu shot completed today.    Blood pressure controlled with current medication therapy. Will need to continue to monitor blood pressure 2-3 times a week. If > 140/90, will need to call the office for sooner appointment.     Elevated AST within the baseline trend. Will continue to monitor and educated on worrisome symptoms. Will continue to monitor every 6 months. Offered liver ultrasound in office today, declined at this time. Continue to work on AHA diet and lifestyle changes to help with high cholesterol. Continue with lovastatin 40 mg and repeat cholesterol and liver enzymes in 6 months.

## 2022-02-23 DIAGNOSIS — K21.9 GASTROESOPHAGEAL REFLUX DISEASE: ICD-10-CM

## 2022-02-24 RX ORDER — OMEPRAZOLE 40 MG/1
CAPSULE, DELAYED RELEASE ORAL
Qty: 90 CAPSULE | Refills: 2 | Status: SHIPPED | OUTPATIENT
Start: 2022-02-24 | End: 2022-10-06

## 2022-03-03 ENCOUNTER — CLINICAL SUPPORT (OUTPATIENT)
Dept: INTERNAL MEDICINE | Facility: CLINIC | Age: 77
End: 2022-03-03

## 2022-03-03 DIAGNOSIS — H61.21 IMPACTED CERUMEN OF RIGHT EAR: ICD-10-CM

## 2022-03-03 DIAGNOSIS — T16.1XXA FOREIGN BODY OF RIGHT EAR, INITIAL ENCOUNTER: Primary | ICD-10-CM

## 2022-03-03 PROCEDURE — 69209 REMOVE IMPACTED EAR WAX UNI: CPT | Performed by: FAMILY MEDICINE

## 2022-03-03 PROCEDURE — 99211 OFF/OP EST MAY X REQ PHY/QHP: CPT | Performed by: FAMILY MEDICINE

## 2022-03-03 NOTE — PROGRESS NOTES
Patient presents to the office stating he has the rubber piece off his hearing aid stuck in his right ear canal.  [facility] and [facility] told [facility] this, but would not or could not remove it.  I looked in his ear and could see the foreign object and removed the rubber piece with forceps.  Pt had a good deal of wax around the object and wax remaining in canal after F.B. was removed, so lavaged and cleared of wax.  Pt voiced no complaints after procedure.

## 2022-03-22 ENCOUNTER — OFFICE VISIT (OUTPATIENT)
Dept: CARDIOLOGY CLINIC | Age: 77
End: 2022-03-22
Payer: MEDICARE

## 2022-03-22 VITALS
HEART RATE: 67 BPM | HEIGHT: 73 IN | BODY MASS INDEX: 29.95 KG/M2 | WEIGHT: 226 LBS | DIASTOLIC BLOOD PRESSURE: 66 MMHG | SYSTOLIC BLOOD PRESSURE: 114 MMHG

## 2022-03-22 DIAGNOSIS — Z95.0 PACEMAKER: ICD-10-CM

## 2022-03-22 DIAGNOSIS — R00.1 BRADYCARDIA: Primary | ICD-10-CM

## 2022-03-22 PROCEDURE — G8484 FLU IMMUNIZE NO ADMIN: HCPCS | Performed by: INTERNAL MEDICINE

## 2022-03-22 PROCEDURE — G8417 CALC BMI ABV UP PARAM F/U: HCPCS | Performed by: INTERNAL MEDICINE

## 2022-03-22 PROCEDURE — 99214 OFFICE O/P EST MOD 30 MIN: CPT | Performed by: INTERNAL MEDICINE

## 2022-03-22 PROCEDURE — 93280 PM DEVICE PROGR EVAL DUAL: CPT | Performed by: INTERNAL MEDICINE

## 2022-03-22 PROCEDURE — 1036F TOBACCO NON-USER: CPT | Performed by: INTERNAL MEDICINE

## 2022-03-22 PROCEDURE — 1123F ACP DISCUSS/DSCN MKR DOCD: CPT | Performed by: INTERNAL MEDICINE

## 2022-03-22 PROCEDURE — 4040F PNEUMOC VAC/ADMIN/RCVD: CPT | Performed by: INTERNAL MEDICINE

## 2022-03-22 PROCEDURE — G8427 DOCREV CUR MEDS BY ELIG CLIN: HCPCS | Performed by: INTERNAL MEDICINE

## 2022-03-22 NOTE — PROGRESS NOTES
Pacemaker interrogated  Presenting rhythm:  AS VS, AP 51.6%,  1.6%  [place]attey voltage 2.74 V, 3 years  Lead status:  Lead impedance within range and stable  Sensing:  P waves 4-5.6 mV,  R waves 15.68-22.40 mV  Thresholds:  Atrial 0.5V @ 0.4ms, ventricular 1.0@ 0.4ms  Observations:  3 new mode switch episodes duration < 1 minute  Reprogramming for sensitivity and threshold testing  Next [facility] appointment:  6/22/22

## 2022-03-22 NOTE — PROGRESS NOTES
HISTORY  58-year-old gentleman with history of past tobacco abuse, dyslipidemia, hypertension, and sinus node dysfunction/pacemaker returns for routine follow-up visit. First underwent implantation of his pacemaker in 2003 with a battery change in 2013. Has had demonstrated atrial fib in the past with an episode lasting as long as 21 hours though they have been asymptomatic. His lipids were last recorded from May 2021 with an LDL of 103, HDL 51, and triglycerides of 138. On return today he relates no symptoms that would suggest LV dysfunction, coronary ischemia, or dysrhythmia/pacer dysfunction. He has been vaccinated and boosted for COVID-19. PHYSICAL EXAM  On exam he carries 226 pounds on a 6 or 1 inch frame. Pressure is 114/66 with a pulse of 67. Nagi complected gentleman with a normal male balding pattern. EOMs full, sclerae and conjunctiva normal. PERRLA. Mask in place. Trachea midline with no neck masses. Assessment of internal jugular veins reveals no elevation of central venous pressure at 45 degrees. Carotid pulses normal without delay or bruit. Thyroid normal to palpation. Pacer pocket well-healed. Chest exam reveals normal respiratory effort, no abnormal breath sounds and normal expiratory phase. No skin lesions seen. PMI normal. S1, S2 normal without murmur or andre or click. Normal bowel sounds without palpable mass or bruit. No clubbing or acrocyanosis. No significant lower extremity edema or signs of venous insufficiency. General motor strength appears to be within normal limits. Normal range of motion with normal gait. Alert, oriented x 3, memory and cognition normal as reflected by history and conversation. EKG reveals sinus rhythm with some nonspecific ST-T wave changes and pacer interrogation reveals normal function, atrial pacing about 54% of the time, and 3 years battery life remaining. ASSESSMENT/PLAN:   1.   Sinus node dysfunction -no symptoms, normal pacer function, no evidence of atrial fibrillation, and 3 years battery life left. 2.  Dyslipidemia -acceptable control. Continue lovastatin 40 mg  3. Hypertension -good control. Continue lisinopril  4.   Pandemic response -appropriate/vaccinated/boosted

## 2022-05-13 RX ORDER — LOVASTATIN 40 MG/1
40 TABLET ORAL NIGHTLY
Qty: 90 TABLET | Refills: 3 | Status: SHIPPED | OUTPATIENT
Start: 2022-05-13 | End: 2023-03-22

## 2022-05-25 DIAGNOSIS — R73.01 IFG (IMPAIRED FASTING GLUCOSE): ICD-10-CM

## 2022-05-25 DIAGNOSIS — E78.00 HIGH CHOLESTEROL: ICD-10-CM

## 2022-05-25 DIAGNOSIS — Z12.5 SPECIAL SCREENING FOR MALIGNANT NEOPLASM OF PROSTATE: ICD-10-CM

## 2022-05-25 DIAGNOSIS — I10 HTN (HYPERTENSION), BENIGN: ICD-10-CM

## 2022-05-26 LAB
ALBUMIN SERPL-MCNC: 4.4 G/DL (ref 3.5–5.2)
ALBUMIN/GLOB SERPL: 1.4 G/DL
ALP SERPL-CCNC: 51 U/L (ref 39–117)
ALT SERPL-CCNC: 29 U/L (ref 1–41)
APPEARANCE UR: CLEAR
AST SERPL-CCNC: 53 U/L (ref 1–40)
BACTERIA #/AREA URNS HPF: NORMAL /HPF
BASOPHILS # BLD AUTO: 0.03 10*3/MM3 (ref 0–0.2)
BASOPHILS NFR BLD AUTO: 0.5 % (ref 0–1.5)
BILIRUB SERPL-MCNC: 0.7 MG/DL (ref 0–1.2)
BILIRUB UR QL STRIP: NEGATIVE
BUN SERPL-MCNC: 15 MG/DL (ref 8–23)
BUN/CREAT SERPL: 16.9 (ref 7–25)
CALCIUM SERPL-MCNC: 9.6 MG/DL (ref 8.6–10.5)
CASTS URNS MICRO: NORMAL
CHLORIDE SERPL-SCNC: 98 MMOL/L (ref 98–107)
CHOLEST SERPL-MCNC: 174 MG/DL (ref 0–200)
CO2 SERPL-SCNC: 24.3 MMOL/L (ref 22–29)
COLOR UR: YELLOW
CREAT SERPL-MCNC: 0.89 MG/DL (ref 0.76–1.27)
EGFRCR SERPLBLD CKD-EPI 2021: 87.2 ML/MIN/1.73
EOSINOPHIL # BLD AUTO: 0.31 10*3/MM3 (ref 0–0.4)
EOSINOPHIL NFR BLD AUTO: 5.6 % (ref 0.3–6.2)
EPI CELLS #/AREA URNS HPF: NORMAL /HPF
ERYTHROCYTE [DISTWIDTH] IN BLOOD BY AUTOMATED COUNT: 11.8 % (ref 12.3–15.4)
GLOBULIN SER CALC-MCNC: 3.1 GM/DL
GLUCOSE SERPL-MCNC: 108 MG/DL (ref 65–99)
GLUCOSE UR QL STRIP: NEGATIVE
HBA1C MFR BLD: 5.2 % (ref 4.8–5.6)
HCT VFR BLD AUTO: 42.1 % (ref 37.5–51)
HDLC SERPL-MCNC: 43 MG/DL (ref 40–60)
HGB BLD-MCNC: 14.4 G/DL (ref 13–17.7)
HGB UR QL STRIP: NEGATIVE
IMM GRANULOCYTES # BLD AUTO: 0.02 10*3/MM3 (ref 0–0.05)
IMM GRANULOCYTES NFR BLD AUTO: 0.4 % (ref 0–0.5)
KETONES UR QL STRIP: NEGATIVE
LDLC SERPL CALC-MCNC: 111 MG/DL (ref 0–100)
LEUKOCYTE ESTERASE UR QL STRIP: NEGATIVE
LYMPHOCYTES # BLD AUTO: 1.44 10*3/MM3 (ref 0.7–3.1)
LYMPHOCYTES NFR BLD AUTO: 26.2 % (ref 19.6–45.3)
MCH RBC QN AUTO: 32.7 PG (ref 26.6–33)
MCHC RBC AUTO-ENTMCNC: 34.2 G/DL (ref 31.5–35.7)
MCV RBC AUTO: 95.7 FL (ref 79–97)
MONOCYTES # BLD AUTO: 0.4 10*3/MM3 (ref 0.1–0.9)
MONOCYTES NFR BLD AUTO: 7.3 % (ref 5–12)
NEUTROPHILS # BLD AUTO: 3.29 10*3/MM3 (ref 1.7–7)
NEUTROPHILS NFR BLD AUTO: 60 % (ref 42.7–76)
NITRITE UR QL STRIP: NEGATIVE
NRBC BLD AUTO-RTO: 0 /100 WBC (ref 0–0.2)
PH UR STRIP: 6.5 [PH] (ref 5–8)
PLATELET # BLD AUTO: 174 10*3/MM3 (ref 140–450)
POTASSIUM SERPL-SCNC: 4.6 MMOL/L (ref 3.5–5.2)
PROT SERPL-MCNC: 7.5 G/DL (ref 6–8.5)
PROT UR QL STRIP: NEGATIVE
PSA SERPL-MCNC: 2.68 NG/ML (ref 0–4)
RBC # BLD AUTO: 4.4 10*6/MM3 (ref 4.14–5.8)
RBC #/AREA URNS HPF: NORMAL /HPF
SODIUM SERPL-SCNC: 135 MMOL/L (ref 136–145)
SP GR UR STRIP: 1.01 (ref 1–1.03)
TRIGL SERPL-MCNC: 112 MG/DL (ref 0–150)
TSH SERPL DL<=0.005 MIU/L-ACNC: 3.21 UIU/ML (ref 0.27–4.2)
URATE SERPL-MCNC: 5.9 MG/DL (ref 3.4–7)
UROBILINOGEN UR STRIP-MCNC: NORMAL MG/DL
VLDLC SERPL CALC-MCNC: 20 MG/DL (ref 5–40)
WBC # BLD AUTO: 5.49 10*3/MM3 (ref 3.4–10.8)
WBC #/AREA URNS HPF: NORMAL /HPF

## 2022-06-01 ENCOUNTER — OFFICE VISIT (OUTPATIENT)
Dept: INTERNAL MEDICINE | Facility: CLINIC | Age: 77
End: 2022-06-01

## 2022-06-01 VITALS
DIASTOLIC BLOOD PRESSURE: 82 MMHG | TEMPERATURE: 97.1 F | OXYGEN SATURATION: 97 % | SYSTOLIC BLOOD PRESSURE: 140 MMHG | WEIGHT: 226 LBS | HEART RATE: 64 BPM | BODY MASS INDEX: 29.82 KG/M2

## 2022-06-01 DIAGNOSIS — R73.01 ELEVATED FASTING GLUCOSE: ICD-10-CM

## 2022-06-01 DIAGNOSIS — I10 HTN (HYPERTENSION), BENIGN: ICD-10-CM

## 2022-06-01 DIAGNOSIS — J30.9 ALLERGIC RHINITIS, MILD: ICD-10-CM

## 2022-06-01 DIAGNOSIS — K21.9 GASTROESOPHAGEAL REFLUX DISEASE, UNSPECIFIED WHETHER ESOPHAGITIS PRESENT: ICD-10-CM

## 2022-06-01 DIAGNOSIS — Z00.00 ENCOUNTER FOR ANNUAL WELLNESS EXAM IN MEDICARE PATIENT: Primary | ICD-10-CM

## 2022-06-01 DIAGNOSIS — R39.11 URINARY HESITANCY: ICD-10-CM

## 2022-06-01 DIAGNOSIS — E78.00 HIGH CHOLESTEROL: ICD-10-CM

## 2022-06-01 DIAGNOSIS — R39.14 BENIGN PROSTATIC HYPERPLASIA WITH INCOMPLETE BLADDER EMPTYING: ICD-10-CM

## 2022-06-01 DIAGNOSIS — N40.1 BENIGN PROSTATIC HYPERPLASIA WITH INCOMPLETE BLADDER EMPTYING: ICD-10-CM

## 2022-06-01 PROBLEM — R33.9 URINARY RETENTION: Status: ACTIVE | Noted: 2022-06-01

## 2022-06-01 PROCEDURE — 1170F FXNL STATUS ASSESSED: CPT

## 2022-06-01 PROCEDURE — 1159F MED LIST DOCD IN RCRD: CPT

## 2022-06-01 PROCEDURE — G0439 PPPS, SUBSEQ VISIT: HCPCS

## 2022-06-01 RX ORDER — TAMSULOSIN HYDROCHLORIDE 0.4 MG/1
1 CAPSULE ORAL DAILY
Qty: 90 CAPSULE | Refills: 0 | Status: SHIPPED | OUTPATIENT
Start: 2022-06-01 | End: 2022-08-04

## 2022-06-01 NOTE — PROGRESS NOTES
The ABCs of the Annual Wellness Visit  Subsequent Medicare Wellness Visit    Chief Complaint   Patient presents with   • Medicare Wellness-subsequent      Subjective    History of Present Illness:  Merlin William Ihnen is a 77 y.o. male who presents for a Subsequent Medicare Wellness Visit.  He is under current management for hypertension, GERD, elevated cholesterol, allergic rhinitis.  He reports that overall he has been doing very well he denies any chest pain, shortness of breath, palpitations, headaches, activity intolerance, dizziness, no GI issues, omeprazole keeps his GERD under control.  No bloody or black tarry stools.  He states that he keeps busy out in his garage he creates things out of stained-glass.  He does have a question concerning worsening left shoulder pain, he states is not very bad not bad enough to even take any medication for but he was curious since he has had all 4 of his COVID shots in that arm if that has something to do with it.  He still has full range of motion of left arm, no numbness or tingling.  He does state that he has issues completely emptying his bladder, he feels like he can go to the bathroom and then a few minutes later has to go back and completely empty it.  He has had no incontinence, no dribbling.  He was seen by Dr. Cruz in the past when he had an elevated PSA level.  Has not followed with urology for years.  Has never had Flomax.    The following portions of the patient's history were reviewed and   updated as appropriate: allergies, current medications, past family history, past medical history, past social history, past surgical history and problem list.    Compared to one year ago, the patient feels his physical   health is the same.    Compared to one year ago, the patient feels his mental   health is the same.    Recent Hospitalizations:  He was not admitted to the hospital during the last year.       Current Medical Providers:  Patient Care Team:  Jessica  MARIAN Gaffney as PCP - General (Internal Medicine)  Keyon Ann MD as Consulting Physician (Gastroenterology)    Outpatient Medications Prior to Visit   Medication Sig Dispense Refill   • Apoaequorin (Prevagen) 10 MG capsule Take 1 capsule by mouth Daily.     • aspirin 81 MG EC tablet Take 81 mg by mouth Daily. Three times a week     • cetirizine (zyrTEC) 10 MG tablet Take  by mouth.     • Cholecalciferol (VITAMIN D3) 2000 UNITS capsule Take  by mouth.     • fluticasone (FLONASE) 50 MCG/ACT nasal spray 1 spray into the nostril(s) as directed by provider Daily.     • lisinopril (PRINIVIL,ZESTRIL) 10 MG tablet TAKE 1 TABLET EVERY DAY 90 tablet 3   • lovastatin (MEVACOR) 40 MG tablet Take 1 tablet by mouth Every Night. 90 tablet 3   • omeprazole (priLOSEC) 40 MG capsule TAKE 1 CAPSULE EVERY DAY. NEED MD APPOINTMENT FOR REFILLS 90 capsule 2   • therapeutic multivitamin-minerals (THERAGRAN-M) tablet Take  by mouth.     • triamcinolone (KENALOG) 0.1 % ointment Apply 1 application topically to the appropriate area as directed 2 (Two) Times a Day As Needed.       No facility-administered medications prior to visit.       No opioid medication identified on active medication list. I have reviewed chart for other potential  high risk medication/s and harmful drug interactions in the elderly.          Aspirin is on active medication list. Aspirin use is indicated based on review of current medical condition/s. Pros and cons of this therapy have been discussed today. Benefits of this medication outweigh potential harm.  Patient has been encouraged to continue taking this medication.  .      Patient Active Problem List   Diagnosis   • FHx: prostate cancer   • Pacemaker   • Peptic ulcer disease   • BPH (benign prostatic hyperplasia)   • Bradycardia   • Erectile dysfunction of organic origin   • Gastroesophageal reflux disease   • Fleming's esophagus without dysplasia   • Nonsmoker   • Obesity, unspecified obesity severity,  unspecified obesity type   • HTN (hypertension), benign   • PAF (paroxysmal atrial fibrillation) (HCC)   • Hx of adenomatous colonic polyps   • Allergic rhinitis, mild   • At low risk for fall   • Elevated fasting glucose   • High cholesterol   • Incisional hernia, without obstruction or gangrene   • Negative depression screening   • Urinary retention     Advance Care Planning  Advance Directive is not on file.  ACP discussion was held with the patient during this visit. Patient does not have an advance directive, declines further assistance.          Objective    Vitals:    06/01/22 1024   BP: 140/82   BP Location: Left arm   Patient Position: Sitting   Cuff Size: Adult   Pulse: 64   Temp: 97.1 °F (36.2 °C)   TempSrc: Temporal   SpO2: 97%   Weight: 103 kg (226 lb)   PainSc: 0-No pain     Body mass index is 29.82 kg/m².  BMI has not been calculated during today's encounter.     Does the patient have evidence of cognitive impairment? No    Physical Exam  Vitals and nursing note reviewed.   Constitutional:       General: He is not in acute distress.     Appearance: Normal appearance. He is normal weight. He is not ill-appearing, toxic-appearing or diaphoretic.   HENT:      Head: Normocephalic and atraumatic.      Right Ear: Tympanic membrane, ear canal and external ear normal. There is no impacted cerumen.      Left Ear: Tympanic membrane, ear canal and external ear normal. There is no impacted cerumen.      Ears:      Comments: Has bilateral hearing aids     Nose: Nose normal. No congestion or rhinorrhea.      Mouth/Throat:      Mouth: Mucous membranes are moist.      Pharynx: Oropharynx is clear. No oropharyngeal exudate or posterior oropharyngeal erythema.   Eyes:      General:         Right eye: No discharge.         Left eye: No discharge.      Extraocular Movements: Extraocular movements intact.      Conjunctiva/sclera: Conjunctivae normal.      Pupils: Pupils are equal, round, and reactive to light.   Neck:       Thyroid: No thyroid mass, thyromegaly or thyroid tenderness.      Vascular: No carotid bruit.   Cardiovascular:      Rate and Rhythm: Normal rate and regular rhythm.      Pulses: Normal pulses.      Heart sounds: Normal heart sounds. No murmur heard.    No friction rub. No gallop.   Pulmonary:      Effort: Pulmonary effort is normal. No respiratory distress.      Breath sounds: Normal breath sounds. No stridor. No wheezing, rhonchi or rales.   Chest:      Chest wall: No tenderness.   Abdominal:      General: Abdomen is flat. Bowel sounds are normal. There is no distension.      Palpations: Abdomen is soft. There is no mass.      Tenderness: There is no abdominal tenderness. There is no guarding or rebound.      Hernia: No hernia is present.   Musculoskeletal:         General: No swelling, tenderness, deformity or signs of injury. Normal range of motion.      Cervical back: Normal range of motion and neck supple. No rigidity or tenderness.      Right lower leg: No edema.      Left lower leg: No edema.   Lymphadenopathy:      Cervical: No cervical adenopathy.   Skin:     General: Skin is warm and dry.      Capillary Refill: Capillary refill takes less than 2 seconds.      Coloration: Skin is not jaundiced or pale.      Findings: No bruising, erythema, lesion or rash.   Neurological:      General: No focal deficit present.      Mental Status: He is alert and oriented to person, place, and time. Mental status is at baseline.      Sensory: No sensory deficit.      Motor: No weakness.      Coordination: Coordination normal.      Gait: Gait normal.   Psychiatric:         Mood and Affect: Mood normal.         Behavior: Behavior normal.         Thought Content: Thought content normal.         Judgment: Judgment normal.       Lab Results   Component Value Date    CHLPL 174 05/25/2022    TRIG 112 05/25/2022    HDL 43 05/25/2022     (H) 05/25/2022    VLDL 20 05/25/2022    HGBA1C 5.20 05/25/2022            HEALTH RISK  ASSESSMENT    Smoking Status:  Social History     Tobacco Use   Smoking Status Former Smoker   • Packs/day: 0.50   • Years: 10.00   • Pack years: 5.00   • Types: Cigarettes   Smokeless Tobacco Never Used   Tobacco Comment    quit over 50 yrs ago     Alcohol Consumption:  Social History     Substance and Sexual Activity   Alcohol Use Yes    Comment: 4-5 times weekly beer     Fall Risk Screen:    CARLOS Fall Risk Assessment was completed, and patient is at LOW risk for falls.Assessment completed on:6/1/2022    Depression Screening:  PHQ-2/PHQ-9 Depression Screening 6/1/2022   Retired PHQ-9 Total Score -   Retired Total Score -   Little Interest or Pleasure in Doing Things 0-->not at all   Feeling Down, Depressed or Hopeless 0-->not at all   PHQ-9: Brief Depression Severity Measure Score 0       Health Habits and Functional and Cognitive Screening:  Functional & Cognitive Status 6/1/2022   Do you have difficulty preparing food and eating? No   Do you have difficulty bathing yourself, getting dressed or grooming yourself? No   Do you have difficulty using the toilet? No   Do you have difficulty moving around from place to place? No   Do you have trouble with steps or getting out of a bed or a chair? No   Current Diet Well Balanced Diet   Dental Exam Up to date   Eye Exam Up to date   Exercise (times per week) 5 times per week   Current Exercises Include Walking   Current Exercise Activities Include -   Do you need help using the phone?  No   Are you deaf or do you have serious difficulty hearing?  No   Do you need help with transportation? No   Do you need help shopping? No   Do you need help preparing meals?  No   Do you need help with housework?  No   Do you need help with laundry? No   Do you need help taking your medications? No   Do you need help managing money? No   Do you ever drive or ride in a car without wearing a seat belt? No   Have you felt unusual stress, anger or loneliness in the last month? No   Who do  you live with? Spouse   If you need help, do you have trouble finding someone available to you? No   Have you been bothered in the last four weeks by sexual problems? No   Do you have difficulty concentrating, remembering or making decisions? No       Age-appropriate Screening Schedule:  Refer to the list below for future screening recommendations based on patient's age, sex and/or medical conditions. Orders for these recommended tests are listed in the plan section. The patient has been provided with a written plan.    Health Maintenance   Topic Date Due   • ZOSTER VACCINE (1 of 2) 06/01/2023 (Originally 1/30/1995)   • INFLUENZA VACCINE  08/01/2022   • LIPID PANEL  05/25/2023   • TDAP/TD VACCINES (2 - Td or Tdap) 05/21/2030              Assessment & Plan   CMS Preventative Services Quick Reference  Risk Factors Identified During Encounter  Alcohol Misuse  Dementia/Memory   Fall Risk-High or Moderate  Immunizations Discussed/Encouraged (specific Immunizations; Shingrix  Obesity/Overweight   The above risks/problems have been discussed with the patient.  Follow up actions/plans if indicated are seen below in the Assessment/Plan Section.  Pertinent information has been shared with the patient in the After Visit Summary.    Diagnoses and all orders for this visit:    1. Encounter for annual wellness exam in Medicare patient (Primary)    2. HTN (hypertension), benign    3. Gastroesophageal reflux disease, unspecified whether esophagitis present    4. Elevated fasting glucose    5. Urinary hesitancy  -     tamsulosin (FLOMAX) 0.4 MG capsule 24 hr capsule; Take 1 capsule by mouth Daily.  Dispense: 90 capsule; Refill: 0    6. Benign prostatic hyperplasia with incomplete bladder emptying    7. Allergic rhinitis, mild    8. High cholesterol        Follow Up:   Return in about 4 weeks (around 6/29/2022) for Recheck flomax.     An After Visit Summary and PPPS were made available to the patient.                 Plan of care and  lab work reviewed, overall cholesterol is improved, minimally impaired fasting glucose, PSA is normal.  We will trial 1 month of Flomax and see him back in the office to see if this helps with his symptoms, if not we will consider referring him to urology.  I advised him to keep an eye on his blood pressure at home, Flomax may cause some orthostatic hypotension.  We will continue current therapy for hypertension, allergic rhinitis, high cholesterol.  He is up-to-date on his vaccines.  I advised that he could get the Shingrix vaccine at his pharmacy, he states understanding.  Up-to-date on colonoscopy.  Has routine eye exams and dental exams done.  I advised that I could not provide a definite answer on the increased left shoulder pain as it relates to the COVID vaccines, however considering the risks of what COVID could do to his overall health I still feel like he made the right choice in getting the vaccinations, he states understanding.  We reviewed his medication list, tolerating all medications currently.  Will follow-up in 1 month to evaluate effectiveness of the Flomax.  Can be seen prior to this as needed.

## 2022-06-02 RX ORDER — LISINOPRIL 10 MG/1
10 TABLET ORAL DAILY
Qty: 90 TABLET | Refills: 3 | Status: SHIPPED | OUTPATIENT
Start: 2022-06-02

## 2022-06-22 DIAGNOSIS — Z95.0 PACEMAKER: Primary | ICD-10-CM

## 2022-06-22 DIAGNOSIS — R00.1 BRADYCARDIA: ICD-10-CM

## 2022-06-22 PROCEDURE — 93296 REM INTERROG EVL PM/IDS: CPT | Performed by: CLINICAL NURSE SPECIALIST

## 2022-06-22 PROCEDURE — 93294 REM INTERROG EVL PM/LDLS PM: CPT | Performed by: CLINICAL NURSE SPECIALIST

## 2022-07-07 ENCOUNTER — OFFICE VISIT (OUTPATIENT)
Dept: INTERNAL MEDICINE | Facility: CLINIC | Age: 77
End: 2022-07-07

## 2022-07-07 VITALS
DIASTOLIC BLOOD PRESSURE: 72 MMHG | TEMPERATURE: 97.1 F | WEIGHT: 244.4 LBS | SYSTOLIC BLOOD PRESSURE: 136 MMHG | OXYGEN SATURATION: 96 % | BODY MASS INDEX: 32.24 KG/M2 | HEART RATE: 79 BPM

## 2022-07-07 DIAGNOSIS — R39.14 BENIGN PROSTATIC HYPERPLASIA WITH INCOMPLETE BLADDER EMPTYING: Primary | ICD-10-CM

## 2022-07-07 DIAGNOSIS — N40.1 BENIGN PROSTATIC HYPERPLASIA WITH INCOMPLETE BLADDER EMPTYING: Primary | ICD-10-CM

## 2022-07-07 PROCEDURE — 99213 OFFICE O/P EST LOW 20 MIN: CPT

## 2022-07-07 NOTE — PROGRESS NOTES
Subjective   Merlin William Ihnen is a 77 y.o. male.   Chief Complaint   Patient presents with   • Follow-up     4 wk flomax f/u, states it seems to be helping some       History of Present Illness   Mr. Eugene is here today for a 4 week follow up on urinary symptoms of poor flow and having to use the bathroom shortly after voiding.  He reports that he has noted some improvement in flow, but it is stronger.  He also notes that he does not have to follow-up in the bathroom as frequently as he used to.  He states that he gets up about once a night to urinate.  Denies any incontinence, dysuria, increased frequency.  He denies any dizziness or orthostatic hypotension.  Denies any chest pain or shortness of breath.   He brings a log of blood pressure readings, majority of readings are 130 systolic Summar 120 systolic over 60s 70s.    The following portions of the patient's history were reviewed and updated as appropriate: allergies, current medications, past family history, past medical history, past social history, past surgical history and problem list.    Review of Systems    Objective   Past Medical History:   Diagnosis Date   • Atrial fibrillation (HCC)    • Cancer (HCC)    • Elevated liver enzymes    • Gastrointestinal stromal tumor (GIST) (HCC)    • GERD (gastroesophageal reflux disease)    • Pacemaker    • Squamous cell skin cancer       Past Surgical History:   Procedure Laterality Date   • COLON SURGERY     • COLONOSCOPY N/A 9/9/2021    Procedure: COLONOSCOPY WITH ANESTHESIA;  Surgeon: Keyon Ann MD;  Location: [facility] ENDOSCOPY;  Service: Gastroenterology;  Laterality: N/A;  preop; hx of polyps  postop; diverticulosis  PCP Celestina Pritchard    • COLONOSCOPY W/ POLYPECTOMY  07/19/2016    Polyp at 70 cm proximal to anus no changes of adenomatous or hyperplastic polyp identified repeat exam in 5 years   • COLONOSCOPY W/ POLYPECTOMY  04/19/2013    Early Tubular adenoma repeat exam in 3 years   • ENDOSCOPY   07/19/2016    Chronic esophagogastritis mild with Intestinal Metaplasia repeat exam in 3 years   • ENDOSCOPY  04/19/2013    Mild chronic inflammation negative for Intestinal Metaplasia    • ENDOSCOPY N/A 10/2/2019    Focal Intestinal Metaplasia repeat exam in 3 years   • NECK SURGERY     • PACEMAKER IMPLANTATION     • SQUAMOUS CELL CARCINOMA EXCISION          Current Outpatient Medications:   •  Apoaequorin (Prevagen) 10 MG capsule, Take 1 capsule by mouth Daily., Disp: , Rfl:   •  aspirin 81 MG EC tablet, Take 81 mg by mouth Daily. Three times a week, Disp: , Rfl:   •  cetirizine (zyrTEC) 10 MG tablet, Take  by mouth., Disp: , Rfl:   •  Cholecalciferol (VITAMIN D3) 2000 UNITS capsule, Take  by mouth., Disp: , Rfl:   •  fluticasone (FLONASE) 50 MCG/ACT nasal spray, 1 spray into the nostril(s) as directed by provider Daily., Disp: , Rfl:   •  lisinopril (PRINIVIL,ZESTRIL) 10 MG tablet, Take 1 tablet by mouth Daily., Disp: 90 tablet, Rfl: 3  •  lovastatin (MEVACOR) 40 MG tablet, Take 1 tablet by mouth Every Night., Disp: 90 tablet, Rfl: 3  •  omeprazole (priLOSEC) 40 MG capsule, TAKE 1 CAPSULE EVERY DAY. NEED MD APPOINTMENT FOR REFILLS, Disp: 90 capsule, Rfl: 2  •  tamsulosin (FLOMAX) 0.4 MG capsule 24 hr capsule, Take 1 capsule by mouth Daily., Disp: 90 capsule, Rfl: 0  •  therapeutic multivitamin-minerals (THERAGRAN-M) tablet, Take  by mouth., Disp: , Rfl:   •  triamcinolone (KENALOG) 0.1 % ointment, Apply 1 application topically to the appropriate area as directed 2 (Two) Times a Day As Needed., Disp: , Rfl:       /72 (BP Location: Left arm, Patient Position: Sitting, Cuff Size: Adult)   Pulse 79   Temp 97.1 °F (36.2 °C) (Temporal)   Wt 111 kg (244 lb 6.4 oz)   SpO2 96%   BMI 32.24 kg/m²      Body mass index is 32.24 kg/m².         Physical Exam  Constitutional:       Appearance: Normal appearance.   HENT:      Head: Normocephalic and atraumatic.   Eyes:      Pupils: Pupils are equal, round, and reactive  to light.   Cardiovascular:      Rate and Rhythm: Normal rate.      Pulses: Normal pulses.   Pulmonary:      Effort: Pulmonary effort is normal.      Breath sounds: Normal breath sounds.   Abdominal:      General: Bowel sounds are normal. There is no distension.      Palpations: Abdomen is soft.      Tenderness: There is no abdominal tenderness.   Musculoskeletal:         General: Normal range of motion.      Cervical back: Normal range of motion.      Right lower leg: No edema.      Left lower leg: No edema.   Skin:     General: Skin is warm and dry.   Neurological:      General: No focal deficit present.      Mental Status: He is alert. Mental status is at baseline.   Psychiatric:         Mood and Affect: Mood normal.         Behavior: Behavior normal.         Thought Content: Thought content normal.         Judgment: Judgment normal.               Assessment & Plan   Diagnoses and all orders for this visit:    1. Benign prostatic hyperplasia with incomplete bladder emptying (Primary)               Plan of care reviewed with Mr. Eugene  Flomax 0.4 mg every day appears to have been effective for him we will continue with this therapy.  Has had no negative side effects.  I have asked him to please reach out if therapy becomes ineffective or he develops any new urinary symptoms.  Will follow-up in November for his usual 6-month recheck on hyperlipidemia, hypertension.  Can be seen prior to this as needed.

## 2022-08-04 DIAGNOSIS — R39.11 URINARY HESITANCY: ICD-10-CM

## 2022-08-04 RX ORDER — TAMSULOSIN HYDROCHLORIDE 0.4 MG/1
CAPSULE ORAL
Qty: 90 CAPSULE | Refills: 3 | Status: SHIPPED | OUTPATIENT
Start: 2022-08-04 | End: 2023-01-18

## 2022-10-04 DIAGNOSIS — K21.9 GASTROESOPHAGEAL REFLUX DISEASE: ICD-10-CM

## 2022-10-05 ENCOUNTER — OFFICE VISIT (OUTPATIENT)
Dept: CARDIOLOGY CLINIC | Age: 77
End: 2022-10-05
Payer: MEDICARE

## 2022-10-05 VITALS
HEART RATE: 68 BPM | WEIGHT: 230 LBS | BODY MASS INDEX: 30.48 KG/M2 | HEIGHT: 73 IN | DIASTOLIC BLOOD PRESSURE: 80 MMHG | SYSTOLIC BLOOD PRESSURE: 136 MMHG | OXYGEN SATURATION: 97 %

## 2022-10-05 DIAGNOSIS — R00.1 BRADYCARDIA: ICD-10-CM

## 2022-10-05 DIAGNOSIS — I48.0 PAF (PAROXYSMAL ATRIAL FIBRILLATION) (HCC): ICD-10-CM

## 2022-10-05 DIAGNOSIS — Z95.0 PACEMAKER: Primary | ICD-10-CM

## 2022-10-05 PROCEDURE — 1123F ACP DISCUSS/DSCN MKR DOCD: CPT | Performed by: CLINICAL NURSE SPECIALIST

## 2022-10-05 PROCEDURE — G8417 CALC BMI ABV UP PARAM F/U: HCPCS | Performed by: CLINICAL NURSE SPECIALIST

## 2022-10-05 PROCEDURE — G8484 FLU IMMUNIZE NO ADMIN: HCPCS | Performed by: CLINICAL NURSE SPECIALIST

## 2022-10-05 PROCEDURE — 99213 OFFICE O/P EST LOW 20 MIN: CPT | Performed by: CLINICAL NURSE SPECIALIST

## 2022-10-05 PROCEDURE — G8427 DOCREV CUR MEDS BY ELIG CLIN: HCPCS | Performed by: CLINICAL NURSE SPECIALIST

## 2022-10-05 PROCEDURE — 93288 INTERROG EVL PM/LDLS PM IP: CPT | Performed by: CLINICAL NURSE SPECIALIST

## 2022-10-05 PROCEDURE — 1036F TOBACCO NON-USER: CPT | Performed by: CLINICAL NURSE SPECIALIST

## 2022-10-05 RX ORDER — TAMSULOSIN HYDROCHLORIDE 0.4 MG/1
CAPSULE ORAL
COMMUNITY
Start: 2022-08-04

## 2022-10-05 NOTE — PATIENT INSTRUCTIONS
1-5-23 for home pacemaker reading   Follow up in 6 mos  With Dr. Erin Saint and pacemaker   Call with any questions or concerns  Follow up with CHERYL Eddy - NP for non cardiac problems  Report any new problems  Cardiovascular Fitness-Exercise as tolerated. Strive for 30 minutes of exercise most days of the week. Cardiac / Healthy Diet  Continue current medications as directed  Continue plan of treatment  It is always recommended that you bring your medications bottles with you to each visit - this is for your safety!

## 2022-10-05 NOTE — PROGRESS NOTES
[id number] [facility] Cardiology  [address] [place] [address]  Phone: [phone]  Fax: [phone]    OFFICE VISIT:  10/5/2022    Mellissa Penn - : 1945    Reason For Visit:  Naren Fry is a 76 y.o. male who is here for 6 Month Follow-Up (Patient denies any cardiac symptoms. )  History of past tobacco abuse, dyslipidemia, hypertension and sinus node dysfunction with pacemaker placement in  and generator replacement in . Is here today for routine follow-up and pacemaker interrogation  He is walking an hours 3-5 days a week    Subjective  Merlin denies exertional chest pain, shortness of breath, orthopnea, paroxysmal nocturnal dyspnea, syncope, presyncope, arrhythmia, edema and fatigue. The patient denies numbness or weakness to suggest cerebrovascular accident or transient ischemic attack. CHERYL Kinney NP is PCP and follows labs.   Mellissa Anitatevin has the following history as recorded in [facility]:    Patient Active Problem List    Diagnosis Date Noted    BPH (benign prostatic hyperplasia) 06/15/2016    Erectile dysfunction of organic origin 06/15/2016    FHx: prostate cancer 06/15/2016    Peptic ulcer disease     Pacemaker     Bradycardia      Past Medical History:   Diagnosis Date    BCC (basal cell carcinoma of skin)     BPH (benign prostatic hyperplasia) 6/15/2016    Bradycardia     Dysmetabolic syndrome     Elevated PSA     Fatty liver     Gout     Hypercholesteremia     Dr. Sourav Dubois manages    Hyperlipidemia     Hypertension     Pacemaker 13  MDL    generator replacement    Peptic ulcer disease     Stomach cancer ([address]      Past Surgical History:   Procedure Laterality Date    CERVICAL SPINE SURGERY      COLONOSCOPY      CYST REMOVAL      GASTRECTOMY      HEMICOLECTOMY      PACEMAKER INSERTION  13    generator change    UPPER GASTROINTESTINAL ENDOSCOPY       Family History   Problem Relation Age of Onset    Heart Disease Mother     Prostate Cancer Brother      Social History     Tobacco Use    Smoking status: Former     Types: Cigarettes     Quit date:      Years since quittin.7    Smokeless tobacco: Never   Substance Use Topics    Alcohol use: Yes      Current Outpatient Medications   Medication Sig Dispense Refill    tamsulosin (FLOMAX) 0.4 MG capsule TAKE 1 CAPSULE EVERY DAY      zinc 50 MG TABS tablet Take 50 mg by mouth daily      Apoaequorin 10 MG CAPS Take by mouth daily      lisinopril (PRINIVIL;ZESTRIL) 10 MG tablet Take 10 mg by mouth daily      lovastatin (MEVACOR) 40 MG tablet Take 40 mg by mouth nightly      aspirin 81 MG tablet Take 81 mg by mouth three times a week       omeprazole (PRILOSEC) 40 MG capsule Take 1 capsule by mouth daily      cetirizine (ZYRTEC) 10 MG tablet Take 10 mg by mouth daily. Multiple Vitamins-Minerals (THERAPEUTIC MULTIVITAMIN-MINERALS) tablet Take 1 tablet by mouth daily. Cholecalciferol (VITAMIN D3) 2000 UNITS CAPS Take by mouth daily        No current facility-administered medications for this visit. Allergies: Amoxicillin and Penicillins    Review of Systems  Constitutional - no significant activity change, appetite change, or unexpected weight change. No fever, chills or diaphoresis. No fatigue. HEENT - no significant rhinorrhea or epistaxis. No tinnitus or significant hearing loss. Eyes - no sudden vision change or amaurosis. Respiratory - no significant wheezing, stridor, apnea or cough. No dyspnea on exertion or shortness of breath. Cardiovascular - no exertional chest pain, orthopnea or PND. No sensation of arrhythmia or slow heart rate. No claudication or leg edema. Gastrointestinal - no abdominal swelling or pain. No blood in stool. No severe constipation, diarrhea, nausea, or vomiting. Genitourinary - no difficulty urinating, dysuria, frequency, or urgency. No flank pain or hematuria. Musculoskeletal - no back pain, gait disturbance, or myalgia. Skin - no color change or rash.   No pallor. No new surgical incision. Neurologic - no speech difficulty, facial asymmetry or lateralizing weakness. No seizures, presyncope, syncope, or significant dizziness. Hematologic - no easy bruising or excessive bleeding. Psychiatric - no severe anxiety or insomnia. No confusion. All other review of systems are negative. Objective  Vital Signs - /80   Pulse 68   Ht 6' 1\" (1.854 m)   Wt 230 lb (104.3 kg)   SpO2 97%   BMI 30.34 kg/m²   General - Merlin is alert, cooperative, and pleasant. Well groomed. No acute distress. Body habitus is overweight. HEENT - The head is normocephalic. No circumoral cyanosis. Dentition is normal.   EYES -  No Xanthelasma, no arcus senilis, no conjunctival hemorrhages or discharge. Neck - Supple, without increased jugular venous pressures. No carotid bruits. No mass. Respiratory - Lungs are clear bilaterally. No wheezes or rales. Normal effort without use of accessory muscles. Cardiovascular - Heart has regular rhythm and rate. No murmurs, rubs or gallops. + pedal pulses and no varicosities. Abdominal -  Soft, nontender, nondistended. Bowel sounds are intact. Extremities - No clubbing, cyanosis, or  edema. Musculoskeletal -  No clubbing . No Osler's nodes. Gait normal .  No kyphosis or scoliosis. Skin -  no statis ulcers or dermatitis. Neurological - No focal signs are identified. Oriented to person, place and time. Psychiatric -  Appropriate affect and mood. Assessment:     Diagnosis Orders   1. Pacemaker        2. Bradycardia        3.  PAF (paroxysmal atrial fibrillation) ([facility]          Data:  BP Readings from Last 3 Encounters:   10/05/22 136/80   03/22/22 114/66   08/24/21 130/80    Pulse Readings from Last 3 Encounters:   10/05/22 68   03/22/22 67   08/24/21 62        Wt Readings from Last 3 Encounters:   10/05/22 230 lb (104.3 kg)   03/22/22 226 lb (102.5 kg)   08/24/21 233 lb (105.7 kg)   Pacemaker check showed adequate battery status- approximately 30 months   and  appropriate diagnostics without any sustained arrhythmias. Mode AAIR-DDDR at 60  AS-VS 45.6%  AP- VS 52%  AS- 0.5%  AP-  2%  Next check in 3 months via [employer] home monitoring system      Blood pressure and heart rate well controlled. Medical management includes  ACE inhibitor. Also on low-dose aspirin and statin  She is active and doing well. Has no change in exercise/activity tolerance     Reviewed PCP recent notes   Reviewed recent labs     States taking medications as prescribed  Stable cardiovascular status. No evidence of overt heart failure, angina or dysrhythmia. 20 minutes were spent preparing, reviewing and seeing patient. All questions answered    Plan    1-5-23 for home pacemaker reading   Follow up in 6 mos  With Dr. Roro Castrejon and pacemaker   Call with any questions or concerns  Follow up with CHERYL Ramirez - NP for non cardiac problems and labs  Report any new problems  Cardiovascular Fitness-Exercise as tolerated. Strive for 30 minutes of exercise most days of the week. Cardiac / Healthy Diet  Continue current medications as directed  Continue plan of treatment  It is always recommended that you bring your medications bottles with you to each visit - this is for your safety! CHERYL Qiu    EMR dragon/transcription disclaimer: Much of this encounter note is electronic transcription/translation of spoken language to printed tach. Electronic translation of spoken language may be erroneous, or at times, nonsensical words or phrases may be inadvertently transcribed.  Although, I have reviewed the note for such errors, some may still exist.

## 2022-10-06 ENCOUNTER — OFFICE VISIT (OUTPATIENT)
Dept: GASTROENTEROLOGY | Facility: CLINIC | Age: 77
End: 2022-10-06

## 2022-10-06 VITALS
DIASTOLIC BLOOD PRESSURE: 78 MMHG | WEIGHT: 225 LBS | OXYGEN SATURATION: 96 % | BODY MASS INDEX: 29.82 KG/M2 | SYSTOLIC BLOOD PRESSURE: 138 MMHG | HEART RATE: 84 BPM | TEMPERATURE: 96.9 F | HEIGHT: 73 IN

## 2022-10-06 DIAGNOSIS — K22.70 BARRETT'S ESOPHAGUS WITHOUT DYSPLASIA: ICD-10-CM

## 2022-10-06 DIAGNOSIS — K21.00 GASTROESOPHAGEAL REFLUX DISEASE WITH ESOPHAGITIS WITHOUT HEMORRHAGE: Primary | ICD-10-CM

## 2022-10-06 PROCEDURE — 99214 OFFICE O/P EST MOD 30 MIN: CPT | Performed by: CLINICAL NURSE SPECIALIST

## 2022-10-06 RX ORDER — OMEPRAZOLE 40 MG/1
CAPSULE, DELAYED RELEASE ORAL
Qty: 90 CAPSULE | Refills: 2 | Status: SHIPPED | OUTPATIENT
Start: 2022-10-06

## 2022-10-06 NOTE — PROGRESS NOTES
Chief Complaint   Patient presents with   • Endoscopy     Subjective   HPI  Merlin William Ihnen is a 77 y.o. male who presents with hx of Barretts esophagus determined by biopsy. Course is constant.  Disease is stable , maintains on Prilosec for the management of this. Last Endoscopy reviewed. He has no complaints of nausea or vomiting. No change in bowels. No wt loss. No BRBPR. No melena. No abdominal pain or dysphagia.  Past Medical History:   Diagnosis Date   • Atrial fibrillation (HCC)    • Cancer (HCC)    • Elevated liver enzymes    • Gastrointestinal stromal tumor (GIST) (HCC)    • GERD (gastroesophageal reflux disease)    • Pacemaker    • Squamous cell skin cancer      Past Surgical History:   Procedure Laterality Date   • COLON SURGERY     • COLONOSCOPY N/A 09/09/2021    Normal exam repeat in 5 years   • COLONOSCOPY W/ POLYPECTOMY  07/19/2016    Polyp at 70 cm proximal to anus no changes of adenomatous or hyperplastic polyp identified repeat exam in 5 years   • COLONOSCOPY W/ POLYPECTOMY  04/19/2013    Early Tubular adenoma repeat exam in 3 years   • ENDOSCOPY  07/19/2016    Chronic esophagogastritis mild with Intestinal Metaplasia repeat exam in 3 years   • ENDOSCOPY  04/19/2013    Mild chronic inflammation negative for Intestinal Metaplasia    • ENDOSCOPY N/A 10/02/2019    Focal Intestinal Metaplasia repeat exam in 3 years   • NECK SURGERY     • PACEMAKER IMPLANTATION     • SQUAMOUS CELL CARCINOMA EXCISION             Current Outpatient Medications:   •  Apoaequorin (Prevagen) 10 MG capsule, Take 1 capsule by mouth Daily., Disp: , Rfl:   •  aspirin 81 MG EC tablet, Take 81 mg by mouth Daily. Three times a week, Disp: , Rfl:   •  cetirizine (zyrTEC) 10 MG tablet, Take  by mouth., Disp: , Rfl:   •  Cholecalciferol (VITAMIN D3) 2000 UNITS capsule, Take  by mouth., Disp: , Rfl:   •  fluticasone (FLONASE) 50 MCG/ACT nasal spray, 1 spray into the nostril(s) as directed by provider Daily., Disp: , Rfl:   •   lisinopril (PRINIVIL,ZESTRIL) 10 MG tablet, Take 1 tablet by mouth Daily., Disp: 90 tablet, Rfl: 3  •  lovastatin (MEVACOR) 40 MG tablet, Take 1 tablet by mouth Every Night., Disp: 90 tablet, Rfl: 3  •  omeprazole (priLOSEC) 40 MG capsule, TAKE 1 CAPSULE EVERY DAY., Disp: 90 capsule, Rfl: 2  •  tamsulosin (FLOMAX) 0.4 MG capsule 24 hr capsule, TAKE 1 CAPSULE EVERY DAY, Disp: 90 capsule, Rfl: 3  •  therapeutic multivitamin-minerals (THERAGRAN-M) tablet, Take  by mouth., Disp: , Rfl:   •  triamcinolone (KENALOG) 0.1 % ointment, Apply 1 application topically to the appropriate area as directed 2 (Two) Times a Day As Needed., Disp: , Rfl:   Allergies   Allergen Reactions   • Amoxicillin Itching   • Penicillins Itching   • Tape Other (See Comments)     bruising   • Latex Rash     Social History     Socioeconomic History   • Marital status:    Tobacco Use   • Smoking status: Former Smoker     Packs/day: 0.50     Years: 10.00     Pack years: 5.00     Types: Cigarettes   • Smokeless tobacco: Never Used   • Tobacco comment: quit over 50 yrs ago   Vaping Use   • Vaping Use: Never used   Substance and Sexual Activity   • Alcohol use: Yes     Comment: 4-5 times weekly beer   • Drug use: No   • Sexual activity: Yes     Partners: Female     Family History   Problem Relation Age of Onset   • Heart disease Mother    • No Known Problems Father    • Colon polyps Neg Hx    • Colon cancer Neg Hx      Health Maintenance   Topic Date Due   • COVID-19 Vaccine (5 - Booster for Pfizer series) 06/22/2022   • INFLUENZA VACCINE  08/01/2022   • ZOSTER VACCINE (1 of 2) 06/01/2023 (Originally 1/30/1993)   • LIPID PANEL  05/25/2023   • ANNUAL WELLNESS VISIT  06/01/2023   • COLORECTAL CANCER SCREENING  09/09/2026   • TDAP/TD VACCINES (2 - Td or Tdap) 05/21/2030   • HEPATITIS C SCREENING  Completed   • Pneumococcal Vaccine 65+  Completed     Review of Systems   Constitutional: Negative for activity change, appetite change, chills,  "diaphoresis, fatigue, fever and unexpected weight change.   HENT: Negative for ear pain, hearing loss, mouth sores, sore throat, trouble swallowing and voice change.    Eyes: Negative.    Respiratory: Negative for cough, choking, shortness of breath and wheezing.    Cardiovascular: Negative for chest pain and palpitations.   Gastrointestinal: Negative for abdominal pain, blood in stool, constipation, diarrhea, nausea and vomiting.   Endocrine: Negative for cold intolerance and heat intolerance.   Genitourinary: Negative for decreased urine volume, dysuria, frequency, hematuria and urgency.   Musculoskeletal: Negative for back pain, gait problem and myalgias.   Skin: Negative for color change, pallor and rash.   Allergic/Immunologic: Negative for food allergies and immunocompromised state.   Neurological: Negative for dizziness, tremors, seizures, syncope, weakness, light-headedness, numbness and headaches.   Hematological: Negative for adenopathy. Does not bruise/bleed easily.   Psychiatric/Behavioral: Negative for agitation and confusion. The patient is not nervous/anxious.    All other systems reviewed and are negative.    Objective   Vitals:    10/06/22 1028   BP: 138/78   Pulse: 84   Temp: 96.9 °F (36.1 °C)   SpO2: 96%   Weight: 102 kg (225 lb)   Height: 185.4 cm (73\")     Body mass index is 29.69 kg/m².    Physical Exam  Constitutional:       Appearance: He is well-developed.   HENT:      Head: Normocephalic and atraumatic.   Eyes:      Pupils: Pupils are equal, round, and reactive to light.   Neck:      Trachea: No tracheal deviation.   Cardiovascular:      Rate and Rhythm: Normal rate and regular rhythm.      Heart sounds: Normal heart sounds. No murmur heard.    No friction rub. No gallop.   Pulmonary:      Effort: Pulmonary effort is normal. No respiratory distress.      Breath sounds: Normal breath sounds. No wheezing or rales.   Chest:      Chest wall: No tenderness.   Abdominal:      General: Bowel " sounds are normal. There is no distension.      Palpations: Abdomen is soft. Abdomen is not rigid.      Tenderness: There is no abdominal tenderness. There is no guarding or rebound.   Musculoskeletal:         General: No tenderness or deformity. Normal range of motion.      Cervical back: Normal range of motion and neck supple.   Skin:     General: Skin is warm and dry.      Coloration: Skin is not pale.      Findings: No rash.   Neurological:      Mental Status: He is alert and oriented to person, place, and time.      Deep Tendon Reflexes: Reflexes are normal and symmetric.   Psychiatric:         Behavior: Behavior normal.         Thought Content: Thought content normal.         Judgment: Judgment normal.         Assessment & Plan   Diagnoses and all orders for this visit:    1. Gastroesophageal reflux disease with esophagitis without hemorrhage (Primary)    2. Fleming's esophagus without dysplasia  -     Case Request; Standing  -     Follow Anesthesia Guidelines / Standing Orders; Future  -     Obtain Informed Consent; Future  -     Case Request    cont PPI therapy long term  I discussed non pharmaceutical treatment of gerd.  This includes gradually losing weight to achieve ideal body wt., elevation of the head of bed by 4-6 inches, nothing to eat or drink 3 hours prior to lying down, avoiding tight clothing, stress reduction, tobacco cessation, reduction of alcohol intake, and dietary restrictions (avoiding caffeine, coffee, fatty foods, mints, chocolate, spicy foods and tomato based sauces as much as possible).      ESOPHAGOGASTRODUODENOSCOPY WITH ANESTHESIA (N/A)  BMI is >= 25 and <30. (Overweight) The following options were offered after discussion;: weight loss educational material (shared in after visit summary)      MARIAN Cadena  10/6/2022  11:05 CDT      IF YOU SMOKE OR USE TOBACCO PLEASE READ THE FOLLOWING:   3.5 minutes provided    Why is smoking bad for me?  Smoking increases the risk of  heart disease, lung disease, vascular disease, stroke, and cancer.     If you smoke, STOP!    If you would like more information on quitting smoking, please visit the [employer] website: www.[employer] Resourcesate/healthier-together/smoke   This link will provide additional resources including the QUIT line and the Beat the Pack support groups.     For more information:    Quit Now [place]  1-800-QUIT-NOW  https://[facility]wendi.quitlogix.org/en-US/    Obesity, Adult  Obesity is having too much body fat. If you have a BMI of 30 or more, you are obese. BMI is a number that explains how much body fat you have. Obesity is often caused by taking in (consuming) more calories than your body uses.  Obesity can cause serious health problems. Changing your lifestyle can help to treat obesity.  Follow these instructions at home:  Eating and drinking     · Follow advice from your doctor about what to eat and drink. Your doctor may tell you to:  ¨ Cut down on (limit) fast foods, sweets, and processed snack foods.  ¨ Choose low-fat options. For example, choose low-fat milk instead of whole milk.  ¨ Eat 5 or more servings of fruits or vegetables every day.  ¨ Eat at home more often. This gives you more control over what you eat.  ¨ Choose healthy foods when you eat out.  ¨ Learn what a healthy portion size is. A portion size is the amount of a certain food that is healthy for you to eat at one time. This is different for each person.  ¨ Keep low-fat snacks available.  ¨ Avoid sugary drinks. These include soda, fruit juice, iced tea that is sweetened with sugar, and flavored milk.  ¨ Eat a healthy breakfast.  · Drink enough water to keep your pee (urine) clear or pale yellow.  · Do not go without eating for long periods of time (do not fast).  · Do not go on popular or trendy diets (fad diets).  Physical Activity   · Exercise often, as told by your doctor. Ask your doctor:  ¨ What types of exercise are safe for  you.  ¨ How often you should exercise.  · Warm up and stretch before being active.  · Do slow stretching after being active (cool down).  · Rest between times of being active.  Lifestyle   · Limit how much time you spend in front of your TV, computer, or video game system (be less sedentary).  · Find ways to reward yourself that do not involve food.  · Limit alcohol intake to no more than 1 drink a day for nonpregnant women and 2 drinks a day for men. One drink equals 12 oz of beer, 5 oz of wine, or 1½ oz of hard liquor.  General instructions   · Keep a weight loss journal. This can help you keep track of:  ¨ The food that you eat.  ¨ The exercise that you do.  · Take over-the-counter and prescription medicines only as told by your doctor.  · Take vitamins and supplements only as told by your doctor.  · Think about joining a support group. Your doctor may be able to help with this.  · Keep all follow-up visits as told by your doctor. This is important.  Contact a doctor if:  · You cannot meet your weight loss goal after you have changed your diet and lifestyle for 6 weeks.  This information is not intended to replace advice given to you by your health care provider. Make sure you discuss any questions you have with your health care provider.  Document Released: 03/11/2013 Document Revised: 05/25/2017 Document Reviewed: 10/05/2016  [employer] Patient Education © 2017 [employer]

## 2022-11-02 ENCOUNTER — ANESTHESIA (OUTPATIENT)
Dept: GASTROENTEROLOGY | Facility: HOSPITAL | Age: 77
End: 2022-11-02

## 2022-11-02 ENCOUNTER — HOSPITAL ENCOUNTER (OUTPATIENT)
Facility: HOSPITAL | Age: 77
Setting detail: HOSPITAL OUTPATIENT SURGERY
Discharge: HOME | End: 2022-11-02
Attending: INTERNAL MEDICINE | Admitting: INTERNAL MEDICINE

## 2022-11-02 ENCOUNTER — ANESTHESIA EVENT (OUTPATIENT)
Dept: GASTROENTEROLOGY | Facility: HOSPITAL | Age: 77
End: 2022-11-02

## 2022-11-02 VITALS
SYSTOLIC BLOOD PRESSURE: 151 MMHG | DIASTOLIC BLOOD PRESSURE: 94 MMHG | WEIGHT: 228 LBS | TEMPERATURE: 97 F | BODY MASS INDEX: 30.22 KG/M2 | RESPIRATION RATE: 20 BRPM | OXYGEN SATURATION: 97 % | HEART RATE: 67 BPM | HEIGHT: 73 IN

## 2022-11-02 DIAGNOSIS — K22.70 BARRETT'S ESOPHAGUS WITHOUT DYSPLASIA: ICD-10-CM

## 2022-11-02 PROCEDURE — 43239 EGD BIOPSY SINGLE/MULTIPLE: CPT | Performed by: INTERNAL MEDICINE

## 2022-11-02 PROCEDURE — 25010000002 PROPOFOL 10 MG/ML EMULSION: Performed by: NURSE ANESTHETIST, CERTIFIED REGISTERED

## 2022-11-02 PROCEDURE — 88305 TISSUE EXAM BY PATHOLOGIST: CPT | Performed by: INTERNAL MEDICINE

## 2022-11-02 RX ORDER — SODIUM CHLORIDE 0.9 % (FLUSH) 0.9 %
10 SYRINGE (ML) INJECTION AS NEEDED
Status: DISCONTINUED | OUTPATIENT
Start: 2022-11-02 | End: 2022-11-02 | Stop reason: HOSPADM

## 2022-11-02 RX ORDER — LIDOCAINE HYDROCHLORIDE 10 MG/ML
0.5 INJECTION, SOLUTION EPIDURAL; INFILTRATION; INTRACAUDAL; PERINEURAL ONCE AS NEEDED
Status: DISCONTINUED | OUTPATIENT
Start: 2022-11-02 | End: 2022-11-02 | Stop reason: HOSPADM

## 2022-11-02 RX ORDER — PROPOFOL 10 MG/ML
VIAL (ML) INTRAVENOUS AS NEEDED
Status: DISCONTINUED | OUTPATIENT
Start: 2022-11-02 | End: 2022-11-02 | Stop reason: SURG

## 2022-11-02 RX ORDER — SODIUM CHLORIDE 9 MG/ML
500 INJECTION, SOLUTION INTRAVENOUS CONTINUOUS PRN
Status: DISCONTINUED | OUTPATIENT
Start: 2022-11-02 | End: 2022-11-02 | Stop reason: HOSPADM

## 2022-11-02 RX ORDER — LIDOCAINE HYDROCHLORIDE 20 MG/ML
INJECTION, SOLUTION EPIDURAL; INFILTRATION; INTRACAUDAL; PERINEURAL AS NEEDED
Status: DISCONTINUED | OUTPATIENT
Start: 2022-11-02 | End: 2022-11-02 | Stop reason: SURG

## 2022-11-02 RX ORDER — SODIUM CHLORIDE 9 MG/ML
1000 INJECTION, SOLUTION INTRAVENOUS CONTINUOUS
Status: DISCONTINUED | OUTPATIENT
Start: 2022-11-02 | End: 2022-11-02 | Stop reason: HOSPADM

## 2022-11-02 RX ADMIN — SODIUM CHLORIDE 500 ML: 9 INJECTION, SOLUTION INTRAVENOUS at 10:06

## 2022-11-02 RX ADMIN — LIDOCAINE HYDROCHLORIDE 150 MG: 20 INJECTION, SOLUTION EPIDURAL; INFILTRATION; INTRACAUDAL; PERINEURAL at 10:29

## 2022-11-02 RX ADMIN — PROPOFOL 180 MG: 10 INJECTION, EMULSION INTRAVENOUS at 10:29

## 2022-11-02 NOTE — ANESTHESIA POSTPROCEDURE EVALUATION
"Patient: Merlin William Ihnen    Procedure Summary     Date: 11/02/22 Room / Location:  [facility] 2 /  [facility]    Anesthesia Start: 1027 Anesthesia Stop: 1039    Procedure: ESOPHAGOGASTRODUODENOSCOPY WITH ANESTHESIA Diagnosis:       Fleming's esophagus without dysplasia      (Fleming's esophagus without dysplasia [K22.70])    Surgeons: Keyon Ann MD Provider: Dina Alcantara CRNA    Anesthesia Type: MAC ASA Status: 3          Anesthesia Type: MAC    Vitals  Vitals Value Taken Time   /81 11/02/22 1046   Temp     Pulse 69 11/02/22 1049   Resp 16 11/02/22 1040   SpO2 96 % 11/02/22 1049   Vitals shown include unvalidated device data.        Post Anesthesia Care and Evaluation    Patient location during evaluation: PHASE II  Patient participation: complete - patient participated  Level of consciousness: awake and alert  Pain management: adequate    Airway patency: patent  Anesthetic complications: No anesthetic complications  PONV Status: none  Cardiovascular status: acceptable  Respiratory status: acceptable  Hydration status: acceptable    Comments: Blood pressure 124/77, pulse 67, temperature 97 °F (36.1 °C), temperature source Temporal, resp. rate 16, height 185.4 cm (73\"), weight 103 kg (228 lb), SpO2 95 %.      No anesthesia care post op    "

## 2022-11-02 NOTE — ANESTHESIA PREPROCEDURE EVALUATION
Anesthesia Evaluation     Patient summary reviewed   no history of anesthetic complications:  NPO Solid Status: > 8 hours             Airway   Mallampati: II  Dental      Pulmonary - negative pulmonary ROS   Cardiovascular   Exercise tolerance: excellent (>7 METS)    (+) pacemaker (medtronic) pacemaker, hypertension, dysrhythmias Paroxysmal Atrial Fib, hyperlipidemia,       Neuro/Psych- negative ROS  GI/Hepatic/Renal/Endo - negative ROS     Musculoskeletal     Abdominal    Substance History      OB/GYN          Other                        Anesthesia Plan    ASA 3     MAC       Anesthetic plan, risks, benefits, and alternatives have been provided, discussed and informed consent has been obtained with: patient.        CODE STATUS:

## 2022-11-03 LAB
CYTO UR: NORMAL
LAB AP CASE REPORT: NORMAL
Lab: NORMAL
PATH REPORT.FINAL DX SPEC: NORMAL
PATH REPORT.GROSS SPEC: NORMAL

## 2022-11-07 DIAGNOSIS — I10 HTN (HYPERTENSION), BENIGN: Primary | ICD-10-CM

## 2022-11-07 DIAGNOSIS — R73.01 ELEVATED FASTING GLUCOSE: ICD-10-CM

## 2022-11-07 DIAGNOSIS — E78.00 HIGH CHOLESTEROL: ICD-10-CM

## 2022-11-08 LAB
ALBUMIN SERPL-MCNC: 4.3 G/DL (ref 3.5–5.2)
ALBUMIN/GLOB SERPL: 1.4 G/DL
ALP SERPL-CCNC: 57 U/L (ref 39–117)
ALT SERPL-CCNC: 37 U/L (ref 1–41)
AST SERPL-CCNC: 64 U/L (ref 1–40)
BILIRUB SERPL-MCNC: 0.6 MG/DL (ref 0–1.2)
BUN SERPL-MCNC: 10 MG/DL (ref 8–23)
BUN/CREAT SERPL: 11.2 (ref 7–25)
CALCIUM SERPL-MCNC: 9.7 MG/DL (ref 8.6–10.5)
CHLORIDE SERPL-SCNC: 98 MMOL/L (ref 98–107)
CHOLEST SERPL-MCNC: 167 MG/DL (ref 0–200)
CO2 SERPL-SCNC: 27.2 MMOL/L (ref 22–29)
CREAT SERPL-MCNC: 0.89 MG/DL (ref 0.76–1.27)
EGFRCR SERPLBLD CKD-EPI 2021: 87.2 ML/MIN/1.73
GLOBULIN SER CALC-MCNC: 3.1 GM/DL
GLUCOSE SERPL-MCNC: 103 MG/DL (ref 65–99)
HBA1C MFR BLD: 5.5 % (ref 4.8–5.6)
HDLC SERPL-MCNC: 49 MG/DL (ref 40–60)
LDLC SERPL CALC-MCNC: 100 MG/DL (ref 0–100)
POTASSIUM SERPL-SCNC: 4.8 MMOL/L (ref 3.5–5.2)
PROT SERPL-MCNC: 7.4 G/DL (ref 6–8.5)
SODIUM SERPL-SCNC: 134 MMOL/L (ref 136–145)
TRIGL SERPL-MCNC: 97 MG/DL (ref 0–150)
VLDLC SERPL CALC-MCNC: 18 MG/DL (ref 5–40)

## 2022-11-14 ENCOUNTER — OFFICE VISIT (OUTPATIENT)
Dept: INTERNAL MEDICINE | Facility: CLINIC | Age: 77
End: 2022-11-14

## 2022-11-14 VITALS
BODY MASS INDEX: 30.54 KG/M2 | WEIGHT: 230.4 LBS | DIASTOLIC BLOOD PRESSURE: 92 MMHG | SYSTOLIC BLOOD PRESSURE: 144 MMHG | TEMPERATURE: 96.9 F | HEIGHT: 73 IN | OXYGEN SATURATION: 97 % | HEART RATE: 98 BPM

## 2022-11-14 DIAGNOSIS — E78.00 HIGH CHOLESTEROL: ICD-10-CM

## 2022-11-14 DIAGNOSIS — R39.14 BENIGN PROSTATIC HYPERPLASIA WITH INCOMPLETE BLADDER EMPTYING: ICD-10-CM

## 2022-11-14 DIAGNOSIS — N40.1 BENIGN PROSTATIC HYPERPLASIA WITH INCOMPLETE BLADDER EMPTYING: ICD-10-CM

## 2022-11-14 DIAGNOSIS — I10 HTN (HYPERTENSION), BENIGN: Primary | ICD-10-CM

## 2022-11-14 PROCEDURE — 99213 OFFICE O/P EST LOW 20 MIN: CPT

## 2022-11-14 NOTE — PROGRESS NOTES
Subjective   Merlin William Ihnen is a 77 y.o. male.   Chief Complaint   Patient presents with   • Hypertension   • Follow-up     Four month follow up   • Hyperlipidemia     Labs in Epic        History of Present Illness   Mr. Eugene presents here today for a 3-month reevaluation of management of hypertension, hyperlipidemia, and BPH.  He reports that he used to check his blood pressures more routinely but has felt fine and they were running normally so he has not been monitoring his closely.  He reports no chest pain, shortness of air, headaches, palpitations, or activity intolerance.  Blood pressure today is 144/92, recent evaluation prior to EGD was 151/94.  He denies any changes to diet or lifestyle.  Continues to be compliant with all medications.  He states that he continues to see improvement in BPH symptoms with use of Flomax, states that he will typically get up 1 time at night to void sometimes twice.  States that he still feels some issues with incomplete emptying however is improved from before starting the Flomax.  Denies any dysuria, hematuria, or foul odor, flank pain, or pelvic discomfort.    The following portions of the patient's history were reviewed and updated as appropriate: allergies, current medications, past family history, past medical history, past social history, past surgical history and problem list.    Review of Systems    Objective   Past Medical History:   Diagnosis Date   • Atrial fibrillation (HCC)    • Cancer (HCC)    • Elevated liver enzymes    • Gastrointestinal stromal tumor (GIST) (HCC)    • GERD (gastroesophageal reflux disease)    • Pacemaker    • Squamous cell skin cancer       Past Surgical History:   Procedure Laterality Date   • COLON SURGERY     • COLONOSCOPY N/A 09/09/2021    Normal exam repeat in 5 years   • COLONOSCOPY W/ POLYPECTOMY  07/19/2016    Polyp at 70 cm proximal to anus no changes of adenomatous or hyperplastic polyp identified repeat exam in 5 years   •  COLONOSCOPY W/ POLYPECTOMY  04/19/2013    Early Tubular adenoma repeat exam in 3 years   • ENDOSCOPY  07/19/2016    Chronic esophagogastritis mild with Intestinal Metaplasia repeat exam in 3 years   • ENDOSCOPY  04/19/2013    Mild chronic inflammation negative for Intestinal Metaplasia    • ENDOSCOPY N/A 10/02/2019    Focal Intestinal Metaplasia repeat exam in 3 years   • ENDOSCOPY N/A 11/2/2022    Procedure: ESOPHAGOGASTRODUODENOSCOPY WITH ANESTHESIA;  Surgeon: Keyon Ann MD;  Location: [facility] ENDOSCOPY;  Service: Gastroenterology;  Laterality: N/A;  pre: barretts  post: barretts. gastric polyps.   Paulina Lopez, APRN   • NECK SURGERY     • PACEMAKER IMPLANTATION     • SQUAMOUS CELL CARCINOMA EXCISION          Current Outpatient Medications:   •  Apoaequorin (Prevagen) 10 MG capsule, Take 1 capsule by mouth Daily., Disp: , Rfl:   •  aspirin 81 MG EC tablet, Take 81 mg by mouth Daily. Three times a week, Disp: , Rfl:   •  cetirizine (zyrTEC) 10 MG tablet, Take  by mouth., Disp: , Rfl:   •  Cholecalciferol (VITAMIN D3) 2000 UNITS capsule, Take  by mouth., Disp: , Rfl:   •  fluticasone (FLONASE) 50 MCG/ACT nasal spray, 1 spray into the nostril(s) as directed by provider Daily., Disp: , Rfl:   •  lisinopril (PRINIVIL,ZESTRIL) 10 MG tablet, Take 1 tablet by mouth Daily., Disp: 90 tablet, Rfl: 3  •  lovastatin (MEVACOR) 40 MG tablet, Take 1 tablet by mouth Every Night., Disp: 90 tablet, Rfl: 3  •  omeprazole (priLOSEC) 40 MG capsule, TAKE 1 CAPSULE EVERY DAY., Disp: 90 capsule, Rfl: 2  •  tamsulosin (FLOMAX) 0.4 MG capsule 24 hr capsule, TAKE 1 CAPSULE EVERY DAY, Disp: 90 capsule, Rfl: 3  •  therapeutic multivitamin-minerals (THERAGRAN-M) tablet, Take  by mouth., Disp: , Rfl:   •  triamcinolone (KENALOG) 0.1 % ointment, Apply 1 application topically to the appropriate area as directed 2 (Two) Times a Day As Needed., Disp: , Rfl:       /92 (BP Location: Right leg, Patient Position: Sitting, Cuff Size:  "Adult)   Pulse 98   Temp 96.9 °F (36.1 °C) (Skin)   Ht 185.4 cm (72.99\")   Wt 105 kg (230 lb 6.4 oz)   SpO2 97%   BMI 30.40 kg/m²      Body mass index is 30.4 kg/m².  BMI is >= 30 and <35. (Class 1 Obesity). The following options were offered after discussion;: exercise counseling/recommendations and nutrition counseling/recommendations       Physical Exam  Vitals and nursing note reviewed.   Constitutional:       General: He is not in acute distress.     Appearance: Normal appearance. He is obese. He is not ill-appearing, toxic-appearing or diaphoretic.   HENT:      Head: Normocephalic and atraumatic.   Eyes:      Extraocular Movements: Extraocular movements intact.      Conjunctiva/sclera: Conjunctivae normal.      Pupils: Pupils are equal, round, and reactive to light.   Cardiovascular:      Rate and Rhythm: Normal rate and regular rhythm.      Pulses: Normal pulses.      Heart sounds: Normal heart sounds.   Pulmonary:      Effort: Pulmonary effort is normal.      Breath sounds: Normal breath sounds.   Abdominal:      General: Bowel sounds are normal.   Musculoskeletal:         General: Normal range of motion.      Cervical back: Normal range of motion and neck supple.      Right lower leg: No edema.      Left lower leg: No edema.   Skin:     General: Skin is warm and dry.      Capillary Refill: Capillary refill takes less than 2 seconds.   Neurological:      General: No focal deficit present.      Mental Status: He is alert and oriented to person, place, and time. Mental status is at baseline.   Psychiatric:         Mood and Affect: Mood normal.         Behavior: Behavior normal.         Thought Content: Thought content normal.         Judgment: Judgment normal.               Assessment & Plan   Diagnoses and all orders for this visit:    1. HTN (hypertension), benign (Primary)    2. High cholesterol    3. Benign prostatic hyperplasia with incomplete bladder emptying               Plan of care and lab " results reviewed with Mr. Eugene  Lipid panel is unremarkable, we will continue with current statin therapy  A1c is 5.5, we will continue with current lifestyle measures  CMP reveals a fasting blood sugar of 103, sodium of 134, and AST of 64.  Did have a biopsy done of his liver many years ago, continues to avoid excessive use of Tylenol and NSAIDs, will intermittently use Aleve for lower back pain, states this is only maybe once or twice weekly.  Advise caution with overuse of NSAIDs or acetaminophen.  We will continue to monitor  Flomax is currently working well for BPH, we will continue with current therapy.  Reassessed blood pressure myself and it was 145/87, I will have him monitor his blood pressures routinely at home for 2 weeks, he is to call if readings are greater than 150/90, he will call in 2 weeks time and if consistently elevated will have to increase his medication.  For now we will follow-up in 3 months, sooner if indicated.

## 2022-12-11 ENCOUNTER — APPOINTMENT (OUTPATIENT)
Dept: CT IMAGING | Age: 77
End: 2022-12-11
Payer: MEDICARE

## 2022-12-11 ENCOUNTER — HOSPITAL ENCOUNTER (INPATIENT)
Age: 77
LOS: 11 days | Discharge: HOME HEALTH | End: 2022-12-22
Attending: EMERGENCY MEDICINE | Admitting: INTERNAL MEDICINE
Payer: MEDICARE

## 2022-12-11 DIAGNOSIS — K56.609 SMALL BOWEL OBSTRUCTION (HCC): ICD-10-CM

## 2022-12-11 DIAGNOSIS — K56.609 SBO (SMALL BOWEL OBSTRUCTION) (HCC): Primary | ICD-10-CM

## 2022-12-11 PROBLEM — E87.1 HYPONATREMIA: Status: ACTIVE | Noted: 2022-12-11

## 2022-12-11 LAB
ALBUMIN SERPL-MCNC: 4.4 G/DL (ref 3.5–5.2)
ALP BLD-CCNC: 67 U/L (ref 40–130)
ALT SERPL-CCNC: 38 U/L (ref 5–41)
AMMONIA: 25 UMOL/L (ref 16–60)
ANION GAP SERPL CALCULATED.3IONS-SCNC: 11 MMOL/L (ref 7–19)
ANION GAP SERPL CALCULATED.3IONS-SCNC: 14 MMOL/L (ref 7–19)
AST SERPL-CCNC: 65 U/L (ref 5–40)
BASOPHILS ABSOLUTE: 0 K/UL (ref 0–0.2)
BASOPHILS RELATIVE PERCENT: 0.3 % (ref 0–1)
BILIRUB SERPL-MCNC: 0.5 MG/DL (ref 0.2–1.2)
BILIRUBIN URINE: NEGATIVE
BLOOD, URINE: NEGATIVE
BUN BLDV-MCNC: 14 MG/DL (ref 8–23)
BUN BLDV-MCNC: 16 MG/DL (ref 8–23)
C-REACTIVE PROTEIN: <0.3 MG/DL (ref 0–0.5)
CALCIUM SERPL-MCNC: 9.3 MG/DL (ref 8.8–10.2)
CALCIUM SERPL-MCNC: 9.9 MG/DL (ref 8.8–10.2)
CHLORIDE BLD-SCNC: 91 MMOL/L (ref 98–111)
CHLORIDE BLD-SCNC: 94 MMOL/L (ref 98–111)
CLARITY: CLEAR
CO2: 23 MMOL/L (ref 22–29)
CO2: 26 MMOL/L (ref 22–29)
COLOR: YELLOW
CREAT SERPL-MCNC: 0.7 MG/DL (ref 0.5–1.2)
CREAT SERPL-MCNC: 0.9 MG/DL (ref 0.5–1.2)
EOSINOPHILS ABSOLUTE: 0 K/UL (ref 0–0.6)
EOSINOPHILS RELATIVE PERCENT: 0.3 % (ref 0–5)
FERRITIN: 474.9 NG/ML (ref 30–400)
GFR SERPL CREATININE-BSD FRML MDRD: >60 ML/MIN/{1.73_M2}
GFR SERPL CREATININE-BSD FRML MDRD: >60 ML/MIN/{1.73_M2}
GLUCOSE BLD-MCNC: 129 MG/DL (ref 74–109)
GLUCOSE BLD-MCNC: 139 MG/DL (ref 74–109)
GLUCOSE URINE: NEGATIVE MG/DL
HCT VFR BLD CALC: 39.8 % (ref 42–52)
HCT VFR BLD CALC: 42 % (ref 42–52)
HEMOGLOBIN: 14 G/DL (ref 14–18)
HEMOGLOBIN: 15.5 G/DL (ref 14–18)
IMMATURE GRANULOCYTES #: 0.1 K/UL
IRON SATURATION: 12 % (ref 14–50)
IRON: 38 UG/DL (ref 59–158)
KETONES, URINE: ABNORMAL MG/DL
LACTIC ACID: 1.1 MMOL/L (ref 0.5–1.9)
LEUKOCYTE ESTERASE, URINE: NEGATIVE
LIPASE: 47 U/L (ref 13–60)
LYMPHOCYTES ABSOLUTE: 0.9 K/UL (ref 1.1–4.5)
LYMPHOCYTES RELATIVE PERCENT: 11.5 % (ref 20–40)
MAGNESIUM: 1.5 MG/DL (ref 1.6–2.4)
MAGNESIUM: 1.9 MG/DL (ref 1.6–2.4)
MCH RBC QN AUTO: 33.2 PG (ref 27–31)
MCH RBC QN AUTO: 33.9 PG (ref 27–31)
MCHC RBC AUTO-ENTMCNC: 35.2 G/DL (ref 33–37)
MCHC RBC AUTO-ENTMCNC: 36.9 G/DL (ref 33–37)
MCV RBC AUTO: 91.9 FL (ref 80–94)
MCV RBC AUTO: 94.3 FL (ref 80–94)
MONOCYTES ABSOLUTE: 0.4 K/UL (ref 0–0.9)
MONOCYTES RELATIVE PERCENT: 4.9 % (ref 0–10)
NEUTROPHILS ABSOLUTE: 6.5 K/UL (ref 1.5–7.5)
NEUTROPHILS RELATIVE PERCENT: 82.4 % (ref 50–65)
NITRITE, URINE: NEGATIVE
OSMOLALITY: 268 MOSM/KG (ref 275–300)
PDW BLD-RTO: 11.3 % (ref 11.5–14.5)
PDW BLD-RTO: 11.4 % (ref 11.5–14.5)
PH UA: 6 (ref 5–8)
PLATELET # BLD: 224 K/UL (ref 130–400)
PLATELET # BLD: 240 K/UL (ref 130–400)
PMV BLD AUTO: 8.3 FL (ref 9.4–12.4)
PMV BLD AUTO: 8.6 FL (ref 9.4–12.4)
POTASSIUM SERPL-SCNC: 4.3 MMOL/L (ref 3.5–5)
POTASSIUM SERPL-SCNC: 4.3 MMOL/L (ref 3.5–5)
PRO-BNP: 23 PG/ML (ref 0–1800)
PROTEIN UA: NEGATIVE MG/DL
RBC # BLD: 4.22 M/UL (ref 4.7–6.1)
RBC # BLD: 4.57 M/UL (ref 4.7–6.1)
SARS-COV-2, NAAT: DETECTED
SODIUM BLD-SCNC: 128 MMOL/L (ref 136–145)
SODIUM BLD-SCNC: 131 MMOL/L (ref 136–145)
SPECIFIC GRAVITY UA: 1.01 (ref 1–1.03)
TOTAL IRON BINDING CAPACITY: 307 UG/DL (ref 250–400)
TOTAL PROTEIN: 8.1 G/DL (ref 6.6–8.7)
TROPONIN: <0.01 NG/ML (ref 0–0.03)
TSH SERPL DL<=0.05 MIU/L-ACNC: 1.47 UIU/ML (ref 0.27–4.2)
UROBILINOGEN, URINE: 0.2 E.U./DL
VITAMIN D 25-HYDROXY: >100 NG/ML
WBC # BLD: 7 K/UL (ref 4.8–10.8)
WBC # BLD: 7.8 K/UL (ref 4.8–10.8)

## 2022-12-11 PROCEDURE — 83880 ASSAY OF NATRIURETIC PEPTIDE: CPT

## 2022-12-11 PROCEDURE — 87635 SARS-COV-2 COVID-19 AMP PRB: CPT

## 2022-12-11 PROCEDURE — 6360000004 HC RX CONTRAST MEDICATION: Performed by: EMERGENCY MEDICINE

## 2022-12-11 PROCEDURE — 74177 CT ABD & PELVIS W/CONTRAST: CPT

## 2022-12-11 PROCEDURE — 6360000002 HC RX W HCPCS: Performed by: EMERGENCY MEDICINE

## 2022-12-11 PROCEDURE — 36415 COLL VENOUS BLD VENIPUNCTURE: CPT

## 2022-12-11 PROCEDURE — 82728 ASSAY OF FERRITIN: CPT

## 2022-12-11 PROCEDURE — 85025 COMPLETE CBC W/AUTO DIFF WBC: CPT

## 2022-12-11 PROCEDURE — 85027 COMPLETE CBC AUTOMATED: CPT

## 2022-12-11 PROCEDURE — 96374 THER/PROPH/DIAG INJ IV PUSH: CPT

## 2022-12-11 PROCEDURE — 82140 ASSAY OF AMMONIA: CPT

## 2022-12-11 PROCEDURE — 1210000000 HC MED SURG R&B

## 2022-12-11 PROCEDURE — 83690 ASSAY OF LIPASE: CPT

## 2022-12-11 PROCEDURE — 83540 ASSAY OF IRON: CPT

## 2022-12-11 PROCEDURE — 99222 1ST HOSP IP/OBS MODERATE 55: CPT | Performed by: SURGERY

## 2022-12-11 PROCEDURE — 84484 ASSAY OF TROPONIN QUANT: CPT

## 2022-12-11 PROCEDURE — 2580000003 HC RX 258: Performed by: EMERGENCY MEDICINE

## 2022-12-11 PROCEDURE — 80053 COMPREHEN METABOLIC PANEL: CPT

## 2022-12-11 PROCEDURE — 2580000003 HC RX 258: Performed by: HOSPITALIST

## 2022-12-11 PROCEDURE — 6370000000 HC RX 637 (ALT 250 FOR IP): Performed by: HOSPITALIST

## 2022-12-11 PROCEDURE — 84443 ASSAY THYROID STIM HORMONE: CPT

## 2022-12-11 PROCEDURE — 86140 C-REACTIVE PROTEIN: CPT

## 2022-12-11 PROCEDURE — 99285 EMERGENCY DEPT VISIT HI MDM: CPT

## 2022-12-11 PROCEDURE — 82306 VITAMIN D 25 HYDROXY: CPT

## 2022-12-11 PROCEDURE — 81003 URINALYSIS AUTO W/O SCOPE: CPT

## 2022-12-11 PROCEDURE — 83735 ASSAY OF MAGNESIUM: CPT

## 2022-12-11 PROCEDURE — 6360000002 HC RX W HCPCS: Performed by: HOSPITALIST

## 2022-12-11 PROCEDURE — 83930 ASSAY OF BLOOD OSMOLALITY: CPT

## 2022-12-11 PROCEDURE — 83550 IRON BINDING TEST: CPT

## 2022-12-11 PROCEDURE — 0D9670Z DRAINAGE OF STOMACH WITH DRAINAGE DEVICE, VIA NATURAL OR ARTIFICIAL OPENING: ICD-10-PCS | Performed by: STUDENT IN AN ORGANIZED HEALTH CARE EDUCATION/TRAINING PROGRAM

## 2022-12-11 PROCEDURE — 93005 ELECTROCARDIOGRAM TRACING: CPT | Performed by: EMERGENCY MEDICINE

## 2022-12-11 PROCEDURE — 83605 ASSAY OF LACTIC ACID: CPT

## 2022-12-11 PROCEDURE — 96375 TX/PRO/DX INJ NEW DRUG ADDON: CPT

## 2022-12-11 RX ORDER — MORPHINE SULFATE 4 MG/ML
4 INJECTION, SOLUTION INTRAMUSCULAR; INTRAVENOUS ONCE
Status: COMPLETED | OUTPATIENT
Start: 2022-12-11 | End: 2022-12-11

## 2022-12-11 RX ORDER — SODIUM CHLORIDE 9 MG/ML
INJECTION, SOLUTION INTRAVENOUS CONTINUOUS
Status: DISCONTINUED | OUTPATIENT
Start: 2022-12-11 | End: 2022-12-14

## 2022-12-11 RX ORDER — SODIUM CHLORIDE 0.9 % (FLUSH) 0.9 %
5-40 SYRINGE (ML) INJECTION PRN
Status: DISCONTINUED | OUTPATIENT
Start: 2022-12-11 | End: 2022-12-14

## 2022-12-11 RX ORDER — PANTOPRAZOLE SODIUM 20 MG/1
20 TABLET, DELAYED RELEASE ORAL
Status: DISCONTINUED | OUTPATIENT
Start: 2022-12-11 | End: 2022-12-14

## 2022-12-11 RX ORDER — TAMSULOSIN HYDROCHLORIDE 0.4 MG/1
0.4 CAPSULE ORAL DAILY
Status: DISCONTINUED | OUTPATIENT
Start: 2022-12-11 | End: 2022-12-22 | Stop reason: HOSPADM

## 2022-12-11 RX ORDER — MORPHINE SULFATE 4 MG/ML
4 INJECTION, SOLUTION INTRAMUSCULAR; INTRAVENOUS ONCE
Status: DISCONTINUED | OUTPATIENT
Start: 2022-12-11 | End: 2022-12-11

## 2022-12-11 RX ORDER — MORPHINE SULFATE 2 MG/ML
2 INJECTION, SOLUTION INTRAMUSCULAR; INTRAVENOUS
Status: DISCONTINUED | OUTPATIENT
Start: 2022-12-11 | End: 2022-12-14

## 2022-12-11 RX ORDER — SODIUM CHLORIDE, SODIUM LACTATE, POTASSIUM CHLORIDE, CALCIUM CHLORIDE 600; 310; 30; 20 MG/100ML; MG/100ML; MG/100ML; MG/100ML
INJECTION, SOLUTION INTRAVENOUS CONTINUOUS
Status: DISCONTINUED | OUTPATIENT
Start: 2022-12-11 | End: 2022-12-11

## 2022-12-11 RX ORDER — ONDANSETRON 2 MG/ML
4 INJECTION INTRAMUSCULAR; INTRAVENOUS ONCE
Status: COMPLETED | OUTPATIENT
Start: 2022-12-11 | End: 2022-12-11

## 2022-12-11 RX ORDER — ACETAMINOPHEN 325 MG/1
650 TABLET ORAL EVERY 6 HOURS PRN
Status: DISCONTINUED | OUTPATIENT
Start: 2022-12-11 | End: 2022-12-14

## 2022-12-11 RX ORDER — ACETAMINOPHEN 650 MG/1
650 SUPPOSITORY RECTAL EVERY 6 HOURS PRN
Status: DISCONTINUED | OUTPATIENT
Start: 2022-12-11 | End: 2022-12-14

## 2022-12-11 RX ORDER — ENOXAPARIN SODIUM 100 MG/ML
30 INJECTION SUBCUTANEOUS 2 TIMES DAILY
Status: DISCONTINUED | OUTPATIENT
Start: 2022-12-12 | End: 2022-12-22 | Stop reason: HOSPADM

## 2022-12-11 RX ORDER — MAGNESIUM SULFATE IN WATER 40 MG/ML
2000 INJECTION, SOLUTION INTRAVENOUS ONCE
Status: COMPLETED | OUTPATIENT
Start: 2022-12-11 | End: 2022-12-11

## 2022-12-11 RX ORDER — ONDANSETRON 4 MG/1
4 TABLET, ORALLY DISINTEGRATING ORAL EVERY 8 HOURS PRN
Status: DISCONTINUED | OUTPATIENT
Start: 2022-12-11 | End: 2022-12-15 | Stop reason: SDUPTHER

## 2022-12-11 RX ORDER — POLYETHYLENE GLYCOL 3350 17 G/17G
17 POWDER, FOR SOLUTION ORAL DAILY PRN
Status: DISCONTINUED | OUTPATIENT
Start: 2022-12-11 | End: 2022-12-14

## 2022-12-11 RX ORDER — ASPIRIN 81 MG/1
81 TABLET ORAL
Status: DISCONTINUED | OUTPATIENT
Start: 2022-12-12 | End: 2022-12-22 | Stop reason: HOSPADM

## 2022-12-11 RX ORDER — SODIUM CHLORIDE 0.9 % (FLUSH) 0.9 %
5-40 SYRINGE (ML) INJECTION EVERY 12 HOURS SCHEDULED
Status: DISCONTINUED | OUTPATIENT
Start: 2022-12-11 | End: 2022-12-22 | Stop reason: HOSPADM

## 2022-12-11 RX ORDER — 0.9 % SODIUM CHLORIDE 0.9 %
1000 INTRAVENOUS SOLUTION INTRAVENOUS ONCE
Status: COMPLETED | OUTPATIENT
Start: 2022-12-11 | End: 2022-12-11

## 2022-12-11 RX ORDER — HYDROMORPHONE HYDROCHLORIDE 1 MG/ML
1 INJECTION, SOLUTION INTRAMUSCULAR; INTRAVENOUS; SUBCUTANEOUS ONCE
Status: COMPLETED | OUTPATIENT
Start: 2022-12-11 | End: 2022-12-11

## 2022-12-11 RX ORDER — ONDANSETRON 2 MG/ML
4 INJECTION INTRAMUSCULAR; INTRAVENOUS EVERY 6 HOURS PRN
Status: DISCONTINUED | OUTPATIENT
Start: 2022-12-11 | End: 2022-12-15 | Stop reason: SDUPTHER

## 2022-12-11 RX ORDER — SODIUM CHLORIDE 9 MG/ML
INJECTION, SOLUTION INTRAVENOUS PRN
Status: DISCONTINUED | OUTPATIENT
Start: 2022-12-11 | End: 2022-12-22 | Stop reason: HOSPADM

## 2022-12-11 RX ADMIN — ONDANSETRON 4 MG: 2 INJECTION INTRAMUSCULAR; INTRAVENOUS at 19:52

## 2022-12-11 RX ADMIN — TAMSULOSIN HYDROCHLORIDE 0.4 MG: 0.4 CAPSULE ORAL at 08:26

## 2022-12-11 RX ADMIN — SODIUM CHLORIDE 1000 ML: 9 INJECTION, SOLUTION INTRAVENOUS at 03:17

## 2022-12-11 RX ADMIN — MORPHINE SULFATE 2 MG: 2 INJECTION, SOLUTION INTRAMUSCULAR; INTRAVENOUS at 15:58

## 2022-12-11 RX ADMIN — HYDROMORPHONE HYDROCHLORIDE 1 MG: 1 INJECTION, SOLUTION INTRAMUSCULAR; INTRAVENOUS; SUBCUTANEOUS at 05:51

## 2022-12-11 RX ADMIN — MAGNESIUM SULFATE HEPTAHYDRATE 2000 MG: 40 INJECTION, SOLUTION INTRAVENOUS at 08:34

## 2022-12-11 RX ADMIN — SODIUM CHLORIDE: 9 INJECTION, SOLUTION INTRAVENOUS at 08:33

## 2022-12-11 RX ADMIN — MORPHINE SULFATE 2 MG: 2 INJECTION, SOLUTION INTRAMUSCULAR; INTRAVENOUS at 12:30

## 2022-12-11 RX ADMIN — MORPHINE SULFATE 4 MG: 4 INJECTION, SOLUTION INTRAMUSCULAR; INTRAVENOUS at 03:14

## 2022-12-11 RX ADMIN — ONDANSETRON 4 MG: 2 INJECTION INTRAMUSCULAR; INTRAVENOUS at 03:14

## 2022-12-11 RX ADMIN — IOPAMIDOL 90 ML: 755 INJECTION, SOLUTION INTRAVENOUS at 03:02

## 2022-12-11 RX ADMIN — SODIUM CHLORIDE, PRESERVATIVE FREE 10 ML: 5 INJECTION INTRAVENOUS at 08:26

## 2022-12-11 RX ADMIN — MORPHINE SULFATE 2 MG: 2 INJECTION, SOLUTION INTRAMUSCULAR; INTRAVENOUS at 08:26

## 2022-12-11 RX ADMIN — PANTOPRAZOLE SODIUM 20 MG: 20 TABLET, DELAYED RELEASE ORAL at 08:26

## 2022-12-11 RX ADMIN — MORPHINE SULFATE 2 MG: 2 INJECTION, SOLUTION INTRAMUSCULAR; INTRAVENOUS at 23:27

## 2022-12-11 RX ADMIN — SODIUM CHLORIDE: 9 INJECTION, SOLUTION INTRAVENOUS at 19:51

## 2022-12-11 RX ADMIN — MORPHINE SULFATE 2 MG: 2 INJECTION, SOLUTION INTRAMUSCULAR; INTRAVENOUS at 19:52

## 2022-12-11 ASSESSMENT — ENCOUNTER SYMPTOMS
CONSTIPATION: 1
SINUS PRESSURE: 1
COLOR CHANGE: 0
COUGH: 1
NAUSEA: 1
DIARRHEA: 1
RHINORRHEA: 0
SINUS PAIN: 1
ABDOMINAL DISTENTION: 1
EYE PAIN: 0
SORE THROAT: 0
EYE REDNESS: 0
RHINORRHEA: 1
VOMITING: 1
CHEST TIGHTNESS: 0
ABDOMINAL PAIN: 1
SHORTNESS OF BREATH: 0
BACK PAIN: 0

## 2022-12-11 ASSESSMENT — PAIN SCALES - GENERAL
PAINLEVEL_OUTOF10: 6
PAINLEVEL_OUTOF10: 7
PAINLEVEL_OUTOF10: 4
PAINLEVEL_OUTOF10: 9
PAINLEVEL_OUTOF10: 5
PAINLEVEL_OUTOF10: 0
PAINLEVEL_OUTOF10: 6
PAINLEVEL_OUTOF10: 0
PAINLEVEL_OUTOF10: 10
PAINLEVEL_OUTOF10: 6

## 2022-12-11 ASSESSMENT — PAIN DESCRIPTION - DESCRIPTORS
DESCRIPTORS: DISCOMFORT
DESCRIPTORS: PRESSURE
DESCRIPTORS: DISCOMFORT
DESCRIPTORS: ACHING

## 2022-12-11 ASSESSMENT — PAIN DESCRIPTION - PAIN TYPE: TYPE: ACUTE PAIN

## 2022-12-11 ASSESSMENT — PAIN DESCRIPTION - LOCATION
LOCATION: ABDOMEN

## 2022-12-11 ASSESSMENT — PAIN - FUNCTIONAL ASSESSMENT
PAIN_FUNCTIONAL_ASSESSMENT: PREVENTS OR INTERFERES SOME ACTIVE ACTIVITIES AND ADLS
PAIN_FUNCTIONAL_ASSESSMENT: 0-10

## 2022-12-11 ASSESSMENT — PAIN DESCRIPTION - ORIENTATION
ORIENTATION: UPPER
ORIENTATION: UPPER
ORIENTATION: MID

## 2022-12-11 ASSESSMENT — PAIN DESCRIPTION - FREQUENCY: FREQUENCY: CONTINUOUS

## 2022-12-11 ASSESSMENT — PAIN DESCRIPTION - DIRECTION: RADIATING_TOWARDS: NO

## 2022-12-11 ASSESSMENT — PAIN DESCRIPTION - ONSET: ONSET: ON-GOING

## 2022-12-11 NOTE — H&P
History and Physical    Patient Name:  Vanita Riddle    :  1945    Chief Complaint:   Abd pain     History of Present Illness:   Vanita Riddle presents to [facility] with 2 day history of abd pain associated with N/V/D, no fever or chills. Abd pain 10 out 10. Today in ER had BM and pain now is 2 out 10. He complains of sneezing and cough. He was found to be Covid positive. Ct ab shows Small bowel obstruction. Transition point is suspected to be within the right hemiabdomen. Upstream bowel loops measure up to 4.5 cm. No evidence of perforation or free air. Mild mesenteric ascites which may be reactive in nature. Past Medical History:   has a past medical history of BCC (basal cell carcinoma of skin), BPH (benign prostatic hyperplasia), Bradycardia, Dysmetabolic syndrome, Elevated PSA, Fatty liver, Gout, Hypercholesteremia, Hyperlipidemia, Hypertension, Pacemaker, Peptic ulcer disease, and Stomach cancer ([address] Surgical History:   has a past surgical history that includes Cervical spine surgery; gastrectomy; cyst removal; hemicolectomy; Upper gastrointestinal endoscopy; Colonoscopy; and Pacemaker insertion (13). Social History:   reports that he quit smoking about 42 years ago. His smoking use included cigarettes. He has never used smokeless tobacco. He reports current alcohol use. He reports that he does not use drugs. Family History:  family history includes Heart Disease in his mother; Prostate Cancer in his brother. Medications:  Prior to Admission medications    Medication Sig Start Date End Date Taking?  Authorizing Provider   tamsulosin (FLOMAX) 0.4 MG capsule TAKE 1 CAPSULE EVERY DAY 22   Historical Provider, MD   zinc 50 MG TABS tablet Take 50 mg by mouth daily    Historical Provider, MD   lisinopril (PRINIVIL;ZESTRIL) 10 MG tablet Take 10 mg by mouth daily    Historical Provider, MD   lovastatin (MEVACOR) 40 MG tablet Take 40 mg by mouth nightly Historical Provider, MD   aspirin 81 MG tablet Take 81 mg by mouth three times a week     Historical Provider, MD   omeprazole (PRILOSEC) 40 MG capsule Take 1 capsule by mouth daily 5/4/16   Historical Provider, MD   Multiple Vitamins-Minerals (THERAPEUTIC MULTIVITAMIN-MINERALS) tablet Take 1 tablet by mouth daily. Historical Provider, MD   Cholecalciferol (VITAMIN D3) 2000 UNITS CAPS Take by mouth daily     Historical Provider, MD       Allergies:  Amoxicillin and Penicillins     Review of Systems:   Constitutional: there has been no unanticipated weight loss. There's been change in energy level, ctivity level. Eyes: No visual changes or diplopia. No scleral icterus. ENT: No Headaches, hearing loss or vertigo. No mouth sores or sore throat. Cardiovascular: No chest pain, palpitations or loss of consciousness. No pleuritic pain or phlebitis. No orthopnea, PND or peripheral edema. Respiratory: No cough or wheezing, no sputum production. No hemoptysis. No dyspnea     Gastrointestinal: abdominal pain, appetite loss, N/V. No blood per rectum. Genitourinary: No dysuria, trouble voiding, or hematuria. Musculoskeletal:  No gait disturbance, weakness or joint complaints. Integumentary: No rash or pruritis. Neurological: No headache, diplopia, change in muscle strength, numbness or tingling. No change in gait, balance, coordination. Psychiatric: No anxiety, or depression. Endocrine: No temperature intolerance. No excessive thirst, fluid intake, or urination. No tremor. Hematologic/Lymphatic: No abnormal bruising or bleeding, blood clots or swollen lymph nodes. Allergic/Immunologic: No nasal congestion or hives. Physical Examination:    Vital Signs: /76   Pulse 78   Temp 98 °F (36.7 °C) (Oral)   Resp 20   Wt 225 lb (102.1 kg)   SpO2 98%   BMI 29.69 kg/m²   General appearance: Well preserved, mesomorphic body habitus, alert, mild distress.   Skin: Skin color, texture, turgor normal. No rashes or lesions. No induration or tightening palpated. Head: Normocephalic. No masses, lesions, tenderness or abnormalities  Eyes: conjunctivae/corneas clear. EOM's intact. Sclera non icteric. Ears: External ears normal.  Hearing normal to finger rub. Nose/Sinuses: Nares normal. Septum midline. Mucosa normal. No drainage or sinus tenderness. Oropharynx: Lips, mucosa, and tongue normal. Oropharynx clear with no exudate seen. Neck: Neck supple, and symmetric. No adenopathy. Trachea is midline. Carotids brisk in upstroke without bruits, No abnormal JVP noted at 45°. Lungs: Lungs clear to auscultation bilaterally. No retractions or use of accessory muscles. No vocal fremitus. No ronchi, crackle or rale. Heart:  S1 > S2. Regular rate and rhythm. No gallop, murmur, rub, palpable thrill or heave noted. Abdomen: Abdomen soft, tender. BS absent. No masses, organomegaly. No hernia noted. Extremities: Extremities normal. No deformities, edema, or skin discoloration. No cyanosis or clubbing noted to the nails. Peripheral pulses 4/4. Musculoskeletal: Spine ROM normal. Muscular strength intact. Neuro: Cranial nerves intact. Motor: Strength 5/5 in all extremities. No focal weakness. Sensory: grossly normal to touch. Pertinent Labs:  CBC:   Recent Labs     12/11/22 0215   WBC 7.8   RBC 4.57*   HGB 15.5   HCT 42.0   MCV 91.9   MCH 33.9*   MCHC 36.9   RDW 11.3*      MPV 8.6*     BMP:   Recent Labs     12/11/22 0215   *   K 4.3   CL 91*   CO2 23   BUN 16   CREATININE 0.7   GLUCOSE 139*   CALCIUM 9.9     ABGs: No results for input(s): PO2, PCO2, PH, HCO3, BE, O2SAT in the last 72 hours. INR: No results for input(s): INR, PROTIME in the last 72 hours.   BNP:  No results found for: BNP  TSH: No results found for: TSH  Cardiac Injury Profile:   Lab Results   Component Value Date    TROPONINI <0.01 12/11/2022     Lipid Profile: No components found for: CHLPL  No results found for: TRIG  No results found for: HDL  No results found for: LDLCALC  No results found for: LABVLDL  Hemoglobin A1C: No results found for: LABA1C  No results found for: EAG      Assessment/Plan:  SBO- IVF, IV pain control. NPO. Gen surgery consult    Hypomagnesemia- replace   Covid infection   Patient Active Problem List:     Peptic ulcer disease     Pacemaker     Bradycardia     BPH (benign prostatic hyperplasia)     Erectile dysfunction of organic origin     FHx: prostate cancer                 I have reviewed my findings and recommendations in detail with Leela Verdugo.     Tigre Abbasi MD

## 2022-12-11 NOTE — ACP (ADVANCE CARE PLANNING)
Advance Care Planning   Healthcare Decision Maker:    Primary Decision Maker: Radha Mejia - [phone]    Secondary Decision Maker: Kennethshailakevin Jackson Roberto Guillaume - [phone]    Click here to complete Healthcare Decision Makers including selection of the Healthcare Decision Maker Relationship (ie \"Primary\"). Today we documented Decision Maker(s) consistent with Legal Next of Kin hierarchy. details…

## 2022-12-11 NOTE — CONSULTS
[facility]    Patient ID: Mellissa Penn  76 y.o.  male  YOB: 1945    Admitting Diagnosis: SBO (small bowel obstruction) ([facility] [K56.609]    Chief Complaint:  Chief Complaint   Patient presents with    Abdominal Pain     Pt reports abdominal pain. Emesis     Pt reports vomiting. Pt reports diarrhea this morning. Pt states that he has not had a normal BM in a week. Subjective:    Mr. Briseida Bassett is a 76 y.o. male who presented overnight with abdominal pain and nausea and vomiting. He had been suffering from an upper respiratory infection at home that started last weekend. He states he had some diarrhea with it, most of that was on Wednesday. He did have a small BM Friday, but he hasn't had a normal Bm in a week or so. He had some nausea that developed Friday and on Saturday he started vomiting, which brought him to the emergency room. He was distended, but the NGT that was just now placed has helped with that some. He notes no flatus today, but did have gas yesterday.  He has never had this problem in the past.     Past Medical History:   Diagnosis Date    BCC (basal cell carcinoma of skin)     BPH (benign prostatic hyperplasia) 6/15/2016    Bradycardia     Dysmetabolic syndrome     Elevated PSA     Fatty liver     Gout     Hypercholesteremia     Dr. Sourav Dubois manages    Hyperlipidemia     Hypertension     Pacemaker 9/9/13  MDL    generator replacement    Peptic ulcer disease     Stomach cancer [facility])      Past Surgical History:   Procedure Laterality Date    CERVICAL SPINE SURGERY      COLONOSCOPY      CYST REMOVAL      GASTRECTOMY      HEMICOLECTOMY      PACEMAKER INSERTION  9/6/13    generator change    UPPER GASTROINTESTINAL ENDOSCOPY       Current Facility-Administered Medications   Medication Dose Route Frequency Provider Last Rate Last Admin    0.9 % sodium chloride infusion   IntraVENous Continuous Greg Triplett  mL/hr at 12/11/22 1050 Rate Verify at 22 1050    sodium chloride flush 0.9 % injection 5-40 mL  5-40 mL IntraVENous 2 times per day Lalo Soriano MD   10 mL at 22 0826    sodium chloride flush 0.9 % injection 5-40 mL  5-40 mL IntraVENous PRN Lalo Soriano MD        0.9 % sodium chloride infusion   IntraVENous PRN MD Eitan Garcia Ramin ON 2022] enoxaparin Sodium (LOVENOX) injection 30 mg  30 mg SubCUTAneous BID Lalo Soriano MD        ondansetron (ZOFRAN-ODT) disintegrating tablet 4 mg  4 mg Oral Q8H PRN Lalo Soriano MD        Or    ondansetron [facility]) injection 4 mg  4 mg IntraVENous Q6H PRN Lalo Soriano MD        polyethylene glycol (GLYCOLAX) packet 17 g  17 g Oral Daily PRN Lalo Soriano MD        acetaminophen (TYLENOL) tablet 650 mg  650 mg Oral Q6H PRN Lalo Soriano MD        Or    acetaminophen (TYLENOL) suppository 650 mg  650 mg Rectal Q6H PRN Lalo Soriano MD        [Held by provider] aspirin EC tablet 81 mg  81 mg Oral Once per day on  Lalo Soriano MD        pantoprazole (PROTONIX) tablet 20 mg  20 mg Oral QAM AC Lalo Soriano MD   20 mg at 22 3530    tamsulosin (FLOMAX) capsule 0.4 mg  0.4 mg Oral Daily Lalo Soriano MD   0.4 mg at 22 0310    morphine (PF) injection 2 mg  2 mg IntraVENous Q3H PRN Lalo Soriano MD   2 mg at 22 1143     Allergies: Amoxicillin and Penicillins    Family History   Problem Relation Age of Onset    Heart Disease Mother     Prostate Cancer Brother        Social History     Tobacco Use    Smoking status: Former     Types: Cigarettes     Quit date: 1980     Years since quittin.9    Smokeless tobacco: Never   Substance Use Topics    Alcohol use: Yes       Review of Systems   Constitutional:  Positive for activity change, appetite change and fatigue. Negative for fever and unexpected weight change.    HENT:  Positive for congestion, postnasal drip, rhinorrhea, sinus pressure and sinus pain. Negative for hearing loss, nosebleeds and sore throat. Eyes:  Negative for pain, redness and visual disturbance. Respiratory:  Positive for cough. Negative for chest tightness and shortness of breath. Cardiovascular:  Negative for chest pain, palpitations and leg swelling. Gastrointestinal:  Positive for abdominal distention, abdominal pain, constipation, diarrhea, nausea and vomiting. Endocrine: Negative for cold intolerance, heat intolerance and polydipsia. Genitourinary:  Negative for difficulty urinating, frequency and urgency. Musculoskeletal:  Negative for back pain, joint swelling and neck pain. Skin:  Negative for color change, rash and wound. Allergic/Immunologic: Negative for environmental allergies and food allergies. Neurological:  Positive for weakness. Negative for seizures, light-headedness and headaches. Hematological:  Negative for adenopathy. Does not bruise/bleed easily. Psychiatric/Behavioral:  Negative for confusion, sleep disturbance and suicidal ideas. Objective:   /86   Pulse 81   Temp 97.5 °F (36.4 °C) (Temporal)   Resp 20   Ht 6' 1\" (1.854 m)   Wt 225 lb (102.1 kg)   SpO2 96%   BMI 29.69 kg/m²     Intake/Output Summary (Last 24 hours) at 12/11/2022 1215  Last data filed at 12/11/2022 1050  Gross per 24 hour   Intake 1022.29 ml   Output --   Net 1022.29 ml     Physical Exam  Vitals reviewed. Constitutional:       Appearance: He is well-developed. HENT:      Head: Normocephalic and atraumatic. Eyes:      Pupils: Pupils are equal, round, and reactive to light. Cardiovascular:      Rate and Rhythm: Normal rate and regular rhythm. Heart sounds: Normal heart sounds. Pulmonary:      Effort: Pulmonary effort is normal.      Breath sounds: Normal breath sounds. No wheezing or rales. Abdominal:      General: Abdomen is protuberant. A surgical scar is present.  There is distension. Palpations: Abdomen is soft. There is no mass. Tenderness: There is no abdominal tenderness. There is no guarding or rebound. Hernia: A hernia (incisional hernia, reducible and nontender.) is present. Musculoskeletal:         General: Normal range of motion. Cervical back: Normal range of motion and neck supple. Lymphadenopathy:      Cervical: No cervical adenopathy. Skin:     General: Skin is warm and dry. Neurological:      Mental Status: He is alert and oriented to person, place, and time. Psychiatric:         Behavior: Behavior normal.         Thought Content: Thought content normal.         Judgment: Judgment normal.       Data:  CBC:   Recent Labs     12/11/22 0215 12/11/22  0751   WBC 7.8 7.0   RBC 4.57* 4.22*   HGB 15.5 14.0   HCT 42.0 39.8*   MCV 91.9 94.3*   RDW 11.3* 11.4*    224     BMP:   Recent Labs     12/11/22 0215 12/11/22  0751   * 131*   K 4.3 4.3   CL 91* 94*   CO2 23 26   ANIONGAP 14 11   GLUCOSE 139* 129*   CREATININE 0.7 0.9   LABGLOM >60 >60   CALCIUM 9.9 9.3     LFT:   Recent Labs     12/11/22 0215   PROT 8.1   LABALBU 4.4   BILITOT 0.5   ALKPHOS 67   ALT 38   AST 65*     PT/INR:No results for input(s): PROTIME, INR in the last 72 hours. CT abd/pelvis   and devices:  Partially visualized pacemaker leads. Impression:     1. Small bowel obstruction. Transition point is suspected to be within the right hemiabdomen (series 2 image 50). Upstream bowel loops measure up to 4.5 cm. Please note this is adjacent to suture material within the right aspect of   the transverse colon and stomach and may relate to adhesions. 2. No evidence of perforation or free air. Mild mesenteric ascites which may be reactive in nature. 3. No other acute findings. 4. Incidental findings as described in the body of   report. Communications:12/11/22 04:52 Verify Receipt Verified receipt with YANETH Arechiga.    on 12/11 04:52 (-06:00)Electronically signed by Maximilian William MD on 12-11-22 at 0425       Assessment:     Principal Problem:    SBO (small bowel obstruction) ([address]  Active Problems:    Hyponatremia  Resolved Problems:    * No resolved hospital problems. *      Plan:     CT images personally reviewed by me. Agree with findings of a transition point and suspected SBO. Discussed with patient and his wife that we will treat conservatively first, with NGT decompression, npo, and ivf. Encourage ambulation and OOBTC if able. Plan for axr tomorrow. Thank you for the courtesy of this consultation. I will continue to follow this patient along with you.            Electronically signed by Navya Bynum DO on 24/99/3875 at 12:15 PM

## 2022-12-11 NOTE — ED PROVIDER NOTES
[facility] EMERGENCY DEPT  eMERGENCY dEPARTMENT eNCOUnter      Pt Name: Kemper Hatchet  MRN: [id number]  [place] 1945  Date of evaluation: 12/11/2022  Provider: Dagoberto Swanson MD    CHIEF COMPLAINT       Chief Complaint   Patient presents with    Abdominal Pain     Pt reports abdominal pain. Emesis     Pt reports vomiting. Pt reports diarrhea this morning. Pt states that he has not had a normal BM in a week. HISTORY OF PRESENT ILLNESS   (Location/Symptom, Timing/Onset,Context/Setting, Quality, Duration, Modifying Factors, Severity)  Note limiting factors. Kemper Hatchet is a 76 y.o. male who presents to the emergency department with epigastric pain nausea vomiting and diarrhea. The patient states he had some mild pain about a week ago when he had a URI but had improved until yesterday. Today he has severe epigastric abdominal pain with vomiting. He has had no fever. He had some diarrhea this morning. HPI    NursingNotes were reviewed. REVIEW OF SYSTEMS    (2-9 systems for level 4, 10 or more for level 5)     Review of Systems   Constitutional:  Negative for chills and fever. HENT:  Negative for rhinorrhea and sore throat. Respiratory:  Negative for shortness of breath. Cardiovascular:  Negative for chest pain and leg swelling. Gastrointestinal:  Positive for abdominal pain, diarrhea, nausea and vomiting. Genitourinary:  Negative for difficulty urinating. Musculoskeletal:  Negative for back pain and neck pain. Skin:  Negative for rash. Neurological:  Negative for weakness and headaches. Psychiatric/Behavioral:  Negative for confusion. A complete review of systems was performed and is negative except as noted above in the HPI.        PAST MEDICAL HISTORY     Past Medical History:   Diagnosis Date    BCC (basal cell carcinoma of skin)     BPH (benign prostatic hyperplasia) 6/15/2016    Bradycardia     Dysmetabolic syndrome     Elevated PSA     Fatty liver     Gout Hypercholesteremia     Dr. 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    generator replacement    Peptic ulcer disease     Stomach cancer ([address]          SURGICAL HISTORY       Past Surgical History:   Procedure Laterality Date    CERVICAL SPINE SURGERY      COLONOSCOPY      CYST REMOVAL      GASTRECTOMY      HEMICOLECTOMY      PACEMAKER INSERTION  13    generator change    UPPER GASTROINTESTINAL ENDOSCOPY           CURRENT MEDICATIONS       Previous Medications    ASPIRIN 81 MG TABLET    Take 81 mg by mouth three times a week     CHOLECALCIFEROL (VITAMIN D3) 2000 UNITS CAPS    Take by mouth daily     LISINOPRIL (PRINIVIL;ZESTRIL) 10 MG TABLET    Take 10 mg by mouth daily    LOVASTATIN (MEVACOR) 40 MG TABLET    Take 40 mg by mouth nightly    MULTIPLE VITAMINS-MINERALS (THERAPEUTIC MULTIVITAMIN-MINERALS) TABLET    Take 1 tablet by mouth daily.     OMEPRAZOLE (PRILOSEC) 40 MG CAPSULE    Take 1 capsule by mouth daily    TAMSULOSIN (FLOMAX) 0.4 MG CAPSULE    TAKE 1 CAPSULE EVERY DAY    ZINC 50 MG TABS TABLET    Take 50 mg by mouth daily       ALLERGIES     Amoxicillin and Penicillins    FAMILY HISTORY       Family History   Problem Relation Age of Onset    Heart Disease Mother     Prostate Cancer Brother           SOCIAL HISTORY       Social History     Socioeconomic History    Marital status:      Spouse name: None    Number of children: None    Years of education: None    Highest education level: None   Tobacco Use    Smoking status: Former     Types: Cigarettes     Quit date:      Years since quittin.9    Smokeless tobacco: Never   Vaping Use    Vaping Use: Never used   Substance and Sexual Activity    Alcohol use: Yes    Drug use: No       SCREENINGS    Jyothi Coma Scale  Eye Opening: Spontaneous  Best Verbal Response: Oriented  Best Motor Response: Obeys commands  Jyothi Coma Scale Score: 15        PHYSICAL EXAM    (up to 7 for level 4, 8 or more for level 5) ED Triage Vitals   BP Temp Temp Source Heart Rate Resp SpO2 Height Weight   12/11/22 0209 12/11/22 0211 12/11/22 0211 12/11/22 0211 12/11/22 0211 12/11/22 0211 -- 12/11/22 0209   (!) 174/91 98 °F (36.7 °C) Oral 75 18 94 %  225 lb (102.1 kg)       Physical Exam  Constitutional:       General: He is not in acute distress. Appearance: He is well-developed. He is ill-appearing. He is not diaphoretic. HENT:      Head: Normocephalic and atraumatic. Eyes:      Pupils: Pupils are equal, round, and reactive to light. Cardiovascular:      Rate and Rhythm: Normal rate and regular rhythm. Heart sounds: Normal heart sounds. Pulmonary:      Effort: Pulmonary effort is normal. No respiratory distress. Breath sounds: Normal breath sounds. Abdominal:      General: Bowel sounds are normal. There is no distension. Palpations: Abdomen is soft. Tenderness: There is abdominal tenderness in the right upper quadrant and epigastric area. Musculoskeletal:         General: Normal range of motion. Cervical back: Normal range of motion and neck supple. Skin:     General: Skin is warm and dry. Findings: No rash. Neurological:      Mental Status: He is alert and oriented to person, place, and time. Cranial Nerves: No cranial nerve deficit. Motor: No abnormal muscle tone.       Coordination: Coordination normal.   Psychiatric:         Behavior: Behavior normal.       DIAGNOSTIC RESULTS     EKG: All EKG's are interpreted by the Emergency Department Physician who either signs or Co-signs this chart in the absence of a cardiologist.    Normal sinus rhythm rate 75 nonspecific ST waves some changes from prior     RADIOLOGY:   Non-plain film images such as CT, Ultrasound and MRI are read by the radiologist. Plainradiographic images are visualized and preliminarily interpreted by the emergency physician with the below findings:      Interpretation per the Radiologist below, if available at the time of this note:    CT ABDOMEN PELVIS W IV CONTRAST Additional Contrast? None   Final Result   1. Small bowel obstruction. Transition point is suspected to be within the right hemiabdomen (series 2 image 50). Upstream bowel loops measure up to 4.5 cm. Please note this is adjacent to suture material within the right aspect of    the transverse colon and stomach and may relate to adhesions. 2. No evidence of perforation or free air. Mild mesenteric ascites which may be reactive in nature. 3. No other acute findings. 4. Incidental findings as described in the body of    report. Communications:12/11/22 04:52 Verify Receipt Verified receipt with YANETH Winston.    on 12/11 04:52 (-06:00)Electronically signed by Maximilian William MD on 12-11-22 at [address] [place] [address]            ED BEDSIDE ULTRASOUND:   Performed by ED Physician - none    LABS:  Labs Reviewed   CBC WITH AUTO DIFFERENTIAL - Abnormal; Notable for the following components:       Result Value    RBC 4.57 (*)     MCH 33.9 (*)     RDW 11.3 (*)     MPV 8.6 (*)     Neutrophils % 82.4 (*)     Lymphocytes % 11.5 (*)     Lymphocytes Absolute 0.9 (*)     All other components within normal limits   COMPREHENSIVE METABOLIC PANEL - Abnormal; Notable for the following components:    Sodium 128 (*)     Chloride 91 (*)     Glucose 139 (*)     AST 65 (*)     All other components within normal limits   URINALYSIS WITH REFLEX TO CULTURE - Abnormal; Notable for the following components:    Ketones, Urine TRACE (*)     All other components within normal limits   MAGNESIUM - Abnormal; Notable for the following components:    Magnesium 1.5 (*)     All other components within normal limits   OSMOLALITY, SERUM - Abnormal; Notable for the following components:    Osmolality 268 (*)     All other components within normal limits   COVID-19, RAPID   LIPASE   TROPONIN   LACTIC ACID   BRAIN NATRIURETIC PEPTIDE   TSH   AMMONIA   VITAMIN D 25 HYDROXY   CBC   BASIC METABOLIC PANEL       All other labs were within normal range or not returned as of this dictation. 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 No ho bowel obstruction in the past. Last BM was last night, last passed gas last night. D/w Dr Martines Petties regarding SBO.  Requested pt admission to hospitalist  D/w Dr Celeste Proctor for admission    CONSULTS:  [address]:  Unless otherwise notedbelow, none     Procedures    FINAL IMPRESSION     1. SBO (small bowel obstruction) ([address]          DISPOSITION/PLAN   DISPOSITION Admitted 12/11/2022 05:00:00 AM      PATIENT REFERRED TO:  @FUP@    DISCHARGE MEDICATIONS:  New Prescriptions    No medications on file          (Please note that portions of this note were completed with a voice recognition program.  Efforts were made to edit the dictations butoccasionally words are mis-transcribed.)    Leonila Carson MD (electronically signed)  AttendingEmergency Physician          Leonila Carson MD  12/11/22 9526

## 2022-12-11 NOTE — PROGRESS NOTES
18 Fr NG tube placed through R nare at bedside with immediate return of 500 mL yellow/brown, thin stomach contents. No complications. Pt states improvement in bloating. Low-intermittent suction applied.        Electronically signed by Josiah Zimmerman RN on 12/11/2022 at 1:13 PM

## 2022-12-12 ENCOUNTER — APPOINTMENT (OUTPATIENT)
Dept: GENERAL RADIOLOGY | Age: 77
End: 2022-12-12
Payer: MEDICARE

## 2022-12-12 PROBLEM — U07.1 COVID-19: Status: ACTIVE | Noted: 2022-12-12

## 2022-12-12 LAB
ANION GAP SERPL CALCULATED.3IONS-SCNC: 12 MMOL/L (ref 7–19)
BUN BLDV-MCNC: 14 MG/DL (ref 8–23)
CALCIUM SERPL-MCNC: 8.9 MG/DL (ref 8.8–10.2)
CHLORIDE BLD-SCNC: 98 MMOL/L (ref 98–111)
CO2: 24 MMOL/L (ref 22–29)
CREAT SERPL-MCNC: 0.8 MG/DL (ref 0.5–1.2)
EKG P AXIS: 163 DEGREES
EKG P-R INTERVAL: 208 MS
EKG Q-T INTERVAL: 398 MS
EKG QRS DURATION: 96 MS
EKG QTC CALCULATION (BAZETT): 422 MS
EKG T AXIS: 118 DEGREES
GFR SERPL CREATININE-BSD FRML MDRD: >60 ML/MIN/{1.73_M2}
GLUCOSE BLD-MCNC: 101 MG/DL (ref 74–109)
HCT VFR BLD CALC: 39.9 % (ref 42–52)
HEMOGLOBIN: 13.9 G/DL (ref 14–18)
MAGNESIUM: 1.9 MG/DL (ref 1.6–2.4)
MCH RBC QN AUTO: 33.1 PG (ref 27–31)
MCHC RBC AUTO-ENTMCNC: 34.8 G/DL (ref 33–37)
MCV RBC AUTO: 95 FL (ref 80–94)
PDW BLD-RTO: 11.7 % (ref 11.5–14.5)
PLATELET # BLD: 233 K/UL (ref 130–400)
PMV BLD AUTO: 8.6 FL (ref 9.4–12.4)
POTASSIUM SERPL-SCNC: 4.3 MMOL/L (ref 3.5–5)
RBC # BLD: 4.2 M/UL (ref 4.7–6.1)
SODIUM BLD-SCNC: 134 MMOL/L (ref 136–145)
WBC # BLD: 8.8 K/UL (ref 4.8–10.8)

## 2022-12-12 PROCEDURE — 6360000002 HC RX W HCPCS: Performed by: NURSE PRACTITIONER

## 2022-12-12 PROCEDURE — 6360000002 HC RX W HCPCS: Performed by: HOSPITALIST

## 2022-12-12 PROCEDURE — 2580000003 HC RX 258: Performed by: HOSPITALIST

## 2022-12-12 PROCEDURE — 85027 COMPLETE CBC AUTOMATED: CPT

## 2022-12-12 PROCEDURE — 74019 RADEX ABDOMEN 2 VIEWS: CPT

## 2022-12-12 PROCEDURE — 2580000003 HC RX 258: Performed by: NURSE PRACTITIONER

## 2022-12-12 PROCEDURE — C9113 INJ PANTOPRAZOLE SODIUM, VIA: HCPCS | Performed by: NURSE PRACTITIONER

## 2022-12-12 PROCEDURE — 83735 ASSAY OF MAGNESIUM: CPT

## 2022-12-12 PROCEDURE — 99233 SBSQ HOSP IP/OBS HIGH 50: CPT | Performed by: SURGERY

## 2022-12-12 PROCEDURE — 1210000000 HC MED SURG R&B

## 2022-12-12 PROCEDURE — 93010 ELECTROCARDIOGRAM REPORT: CPT | Performed by: INTERNAL MEDICINE

## 2022-12-12 PROCEDURE — 94760 N-INVAS EAR/PLS OXIMETRY 1: CPT

## 2022-12-12 PROCEDURE — 74019 RADEX ABDOMEN 2 VIEWS: CPT | Performed by: RADIOLOGY

## 2022-12-12 PROCEDURE — 80048 BASIC METABOLIC PNL TOTAL CA: CPT

## 2022-12-12 PROCEDURE — 36415 COLL VENOUS BLD VENIPUNCTURE: CPT

## 2022-12-12 RX ORDER — FLUTICASONE PROPIONATE 50 MCG
1 SPRAY, SUSPENSION (ML) NASAL DAILY
Status: DISCONTINUED | OUTPATIENT
Start: 2022-12-13 | End: 2022-12-22 | Stop reason: HOSPADM

## 2022-12-12 RX ADMIN — ENOXAPARIN SODIUM 30 MG: 100 INJECTION SUBCUTANEOUS at 11:45

## 2022-12-12 RX ADMIN — MORPHINE SULFATE 2 MG: 2 INJECTION, SOLUTION INTRAMUSCULAR; INTRAVENOUS at 08:04

## 2022-12-12 RX ADMIN — SODIUM CHLORIDE: 9 INJECTION, SOLUTION INTRAVENOUS at 03:55

## 2022-12-12 RX ADMIN — SODIUM CHLORIDE, PRESERVATIVE FREE 40 MG: 5 INJECTION INTRAVENOUS at 11:20

## 2022-12-12 RX ADMIN — ENOXAPARIN SODIUM 30 MG: 100 INJECTION SUBCUTANEOUS at 21:26

## 2022-12-12 RX ADMIN — ONDANSETRON 4 MG: 2 INJECTION INTRAMUSCULAR; INTRAVENOUS at 18:21

## 2022-12-12 RX ADMIN — MORPHINE SULFATE 2 MG: 2 INJECTION, SOLUTION INTRAMUSCULAR; INTRAVENOUS at 14:57

## 2022-12-12 RX ADMIN — MORPHINE SULFATE 2 MG: 2 INJECTION, SOLUTION INTRAMUSCULAR; INTRAVENOUS at 18:22

## 2022-12-12 RX ADMIN — MORPHINE SULFATE 2 MG: 2 INJECTION, SOLUTION INTRAMUSCULAR; INTRAVENOUS at 11:45

## 2022-12-12 RX ADMIN — MORPHINE SULFATE 2 MG: 2 INJECTION, SOLUTION INTRAMUSCULAR; INTRAVENOUS at 04:29

## 2022-12-12 RX ADMIN — SODIUM CHLORIDE, PRESERVATIVE FREE 10 ML: 5 INJECTION INTRAVENOUS at 11:21

## 2022-12-12 RX ADMIN — ONDANSETRON 4 MG: 2 INJECTION INTRAMUSCULAR; INTRAVENOUS at 08:04

## 2022-12-12 RX ADMIN — MORPHINE SULFATE 2 MG: 2 INJECTION, SOLUTION INTRAMUSCULAR; INTRAVENOUS at 22:06

## 2022-12-12 ASSESSMENT — PAIN SCALES - GENERAL
PAINLEVEL_OUTOF10: 7
PAINLEVEL_OUTOF10: 7
PAINLEVEL_OUTOF10: 0
PAINLEVEL_OUTOF10: 7
PAINLEVEL_OUTOF10: 0
PAINLEVEL_OUTOF10: 8
PAINLEVEL_OUTOF10: 7
PAINLEVEL_OUTOF10: 9

## 2022-12-12 ASSESSMENT — PAIN - FUNCTIONAL ASSESSMENT: PAIN_FUNCTIONAL_ASSESSMENT: ACTIVITIES ARE NOT PREVENTED

## 2022-12-12 ASSESSMENT — PAIN DESCRIPTION - LOCATION
LOCATION: ABDOMEN

## 2022-12-12 ASSESSMENT — PAIN DESCRIPTION - DESCRIPTORS
DESCRIPTORS: ACHING;SQUEEZING
DESCRIPTORS: ACHING;SQUEEZING
DESCRIPTORS: ACHING;DISCOMFORT

## 2022-12-12 ASSESSMENT — PAIN DESCRIPTION - ORIENTATION
ORIENTATION: UPPER
ORIENTATION: MID

## 2022-12-12 NOTE — PLAN OF CARE
Problem: ABCDS Injury Assessment  Goal: Absence of physical injury  12/12/2022 1602 by Joseph Ravi RN  Outcome: Progressing  12/12/2022 0311 by Rehana Hampton LPN  Outcome: Progressing  Flowsheets (Taken 12/12/2022 0028)  Absence of Physical Injury: Implement safety measures based on patient assessment  12/12/2022 0310 by Rehana Hampton LPN  Outcome: Progressing  Flowsheets (Taken 12/12/2022 0028)  Absence of Physical Injury: Implement safety measures based on patient assessment     Problem: Pain  Goal: Verbalizes/displays adequate comfort level or baseline comfort level  12/12/2022 1602 by Joseph Ravi RN  Outcome: Progressing  12/12/2022 0311 by Rehana Hampton LPN  Outcome: Progressing  Flowsheets (Taken 12/12/2022 0015)  Verbalizes/displays adequate comfort level or baseline comfort level:   Encourage patient to monitor pain and request assistance   Assess pain using appropriate pain scale   Administer analgesics based on type and severity of pain and evaluate response   Implement non-pharmacological measures as appropriate and evaluate response   Consider cultural and social influences on pain and pain management   Notify Licensed Independent Practitioner if interventions unsuccessful or patient reports new pain  12/12/2022 0310 by Rehana Hampton LPN  Outcome: Progressing  Flowsheets (Taken 12/12/2022 0015)  Verbalizes/displays adequate comfort level or baseline comfort level:   Encourage patient to monitor pain and request assistance   Assess pain using appropriate pain scale   Administer analgesics based on type and severity of pain and evaluate response   Implement non-pharmacological measures as appropriate and evaluate response   Consider cultural and social influences on pain and pain management   Notify Licensed Independent Practitioner if interventions unsuccessful or patient reports new pain     Problem: Gastrointestinal - Adult  Goal: Minimal or absence of nausea and vomiting  Outcome: Progressing  Goal: Maintains or returns to baseline bowel function  Outcome: Progressing  Goal: Maintains adequate nutritional intake  Outcome: Progressing  Goal: Establish and maintain optimal ostomy function  Outcome: Progressing     Problem: Infection - Adult  Goal: Absence of infection at discharge  Outcome: Progressing  Goal: Absence of infection during hospitalization  Outcome: Progressing  Goal: Absence of fever/infection during anticipated neutropenic period  Outcome: Progressing

## 2022-12-12 NOTE — CARE COORDINATION
Patient Contact Information:  [address]  [address]  [phone] (home)   Above information verified? [x]   Yes  []   No    Have you been vaccinated for COVID-19 (SARS-CoV-2)? [x]   Yes  []   No                   If so, when? Which :  [x]   Pfizer-BioNTech  []   Moderna  []   Iam Vines  []   Other:       Pharmacy:    [facility] [address]  [address] [facility] [address]  Phone: [phone] Fax: [phone]    [facility] [place] [address] [address] [place] [id number] [facility] [phone]  [facility]  [address] [place] [address]  Phone: [phone] Fax: [phone]      Active with HD/PD prior to admission:           []   Yes  [x]   No  HD Center:       Financial:  Payor: MEDICARE / Plan: MEDICARE PART A AND B / Product Type: *No Product type* /     Pre-Cert required for SNF:   []   Yes  [x]   No    Patient Deficits:  []   Yes   [x]   No    If yes:  []   Confusion/Memory  []   Visual  []   Motor/Sensory         []   Right arm         []   Right leg         []   Left arm         []   Left leg  []   Language/Speech         []   Aphasia         []   Dysarthria         []   Swallow         Jyothi Coma Scale  Eye Opening: Spontaneous  Best Verbal Response: Oriented  Best Motor Response: Obeys commands  Jyothi Coma Scale Score: 15    Patient Deficit Notes: Additional CM/SW Notes:   Patient lives at home with his spouse and plans to return home at discharge. Prior to admission, patient was independent with ADLs. He is not interested in [address] [place] [address] services.     Dedra Bell RN  [facility]     12/12/22 4093   Service Assessment   Patient Orientation Alert and Oriented;Person;Place;Situation   Cognition Alert   History Provided By Patient   Primary Caregiver Self   Support Systems Spouse/Significant Other   PCP Verified by CM Yes Prior Functional Level Independent in ADLs/IADLs   Current Functional Level Independent in ADLs/IADLs   Can patient return to prior living arrangement Yes   Ability to make needs known: Good   Family able to assist with home care needs: Yes   Financial Resources   (no financial needs identified)   Social/Functional History   Lives With Spouse   Type of [address] One level   Home Access Stairs to enter with rails   Entrance Stairs - Number of Steps 3   Bathroom Shower/Tub Walk-in shower   ADL Assistance Independent   Ambulation Assistance Independent   Transfer Assistance Independent   Active  Yes   Discharge Planning   Type of [address] Prior To Admission None   Potential Assistance Needed N/A   DME Ordered? No   Potential Assistance Purchasing Medications No   Type of Home Care Services None   Patient expects to be discharged to: [facility] Discharge   Services At/After Discharge None   Mode of Transport at Discharge Other (see comment)  (spouse)   Condition of Participation: Discharge Planning   The Patient and/or Patient Representative was provided with a Choice of Provider? Patient   The Patient and/Or Patient Representative agree with the Discharge Plan?  Yes

## 2022-12-12 NOTE — PLAN OF CARE
Problem: ABCDS Injury Assessment  Goal: Absence of physical injury  12/12/2022 0311 by Cadence Obrien LPN  Outcome: Progressing  Flowsheets (Taken 12/12/2022 0028)  Absence of Physical Injury: Implement safety measures based on patient assessment  12/12/2022 0310 by Cadence Obrien LPN  Outcome: Progressing  Flowsheets (Taken 12/12/2022 0028)  Absence of Physical Injury: Implement safety measures based on patient assessment  12/11/2022 1725 by Romel Marie RN  Outcome: Progressing     Problem: Pain  Goal: Verbalizes/displays adequate comfort level or baseline comfort level  12/12/2022 0311 by Cadence Obrien LPN  Outcome: Progressing  Flowsheets (Taken 12/12/2022 0015)  Verbalizes/displays adequate comfort level or baseline comfort level:   Encourage patient to monitor pain and request assistance   Assess pain using appropriate pain scale   Administer analgesics based on type and severity of pain and evaluate response   Implement non-pharmacological measures as appropriate and evaluate response   Consider cultural and social influences on pain and pain management   Notify Licensed Independent Practitioner if interventions unsuccessful or patient reports new pain  12/12/2022 0310 by Cadence Obrien LPN  Outcome: Progressing  Flowsheets (Taken 12/12/2022 0015)  Verbalizes/displays adequate comfort level or baseline comfort level:   Encourage patient to monitor pain and request assistance   Assess pain using appropriate pain scale   Administer analgesics based on type and severity of pain and evaluate response   Implement non-pharmacological measures as appropriate and evaluate response   Consider cultural and social influences on pain and pain management   Notify Licensed Independent Practitioner if interventions unsuccessful or patient reports new pain  12/11/2022 1725 by Romel Marie RN  Outcome: Progressing

## 2022-12-12 NOTE — PROGRESS NOTES
[facility]      Patient:  Bharat Salomon  YOB: 1945  Date of Service: 12/12/2022  MRN: [id number]   Acct: [de-identified]   Primary Care Physician: CHERYL Alcala NP  Advance Directive: Full Code  Admit Date: 12/11/2022       Hospital Day: 1  Portions of this note have been copied forward, however, changed to reflect the most current clinical status of this patient. CHIEF COMPLAINT Abdominal pain    SUBJECTIVE:  Abdominal pain has improved but does continue. Remains afebrile. Does report that he is passing flatus. C/O intermittent cough. Remains on [address]:  The patient is a 77 yo male with a PMH of pacer, BPH, HTN, HLD, GIST with gastrectomy who presented to [facility] ED on 12/11/2022 with c/o epigastric pain, N/V, and diarrhea for ~1 week that was worsening in nature. He additionally reported a URI that had been improving until the day prior to admission. Further ED work-up revealed that the pt was + for Covid 19, CT of the abdomen and pelvis showed SBO with transition point suspected to be within the hemiabdomen. Upstream bowel loops measured up to 4.5cm. Labs revealed hyponatremia with an Na-128 and Cl-91. The patient was admitted to hospital medicine due to SBO and hyponatremia with a general surgery consult. An NGT was placed to [facility] and general surgery plans to treat conservatively at this time. NGT remains at LIS and the patient remains strict NPO. IVF's continues with improvement of electrolytes-Na-134, otherwise WNL. WBC-8.8, H&H-13.9/39.9 and platelet-233. Remains on RA. KUB on 12/12/2022 continues to show SBO per general surgery. Review of Systems:   Review of Systems   Constitutional:  Negative for chills, diaphoresis, fatigue and fever. HENT:  Negative for congestion and ear pain. Eyes:  Negative for visual disturbance. Respiratory:  Negative for cough, shortness of breath and wheezing.     Cardiovascular:  Negative for chest pain, palpitations and leg swelling. Gastrointestinal:  Positive for abdominal distention and abdominal pain. Negative for blood in stool, constipation, diarrhea, nausea and vomiting. Endocrine: Negative for cold intolerance and heat intolerance. Genitourinary:  Negative for difficulty urinating, flank pain, frequency and urgency. Musculoskeletal:  Negative for arthralgias and myalgias. Skin:  Negative for color change and wound. Neurological:  Negative for dizziness, syncope, weakness, light-headedness, numbness and headaches. Hematological:  Does not bruise/bleed easily. Psychiatric/Behavioral:  Negative for agitation, confusion and dysphoric mood. 14 point review of systems is negative except as specifically addressed above. Objective:   VITALS:  BP (!) 163/84   Pulse 70   Temp 96.8 °F (36 °C) (Temporal)   Resp 18   Ht 6' 1\" (1.854 m)   Wt 225 lb (102.1 kg)   SpO2 95%   BMI 29.69 kg/m²   24HR INTAKE/OUTPUT:    Intake/Output Summary (Last 24 hours) at 12/12/2022 1646  Last data filed at 12/12/2022 1236  Gross per 24 hour   Intake 1930 ml   Output 1300 ml   Net 630 ml         Physical Exam  Vitals and nursing note reviewed. Constitutional:       General: He is not in acute distress. Appearance: Normal appearance. He is ill-appearing. HENT:      Head: Normocephalic and atraumatic. Right Ear: External ear normal.      Left Ear: External ear normal.      Nose: Nose normal.      Comments: NGT to right nares LIS-brown drainage     Mouth/Throat:      Mouth: Mucous membranes are moist.   Eyes:      Extraocular Movements: Extraocular movements intact. Conjunctiva/sclera: Conjunctivae normal.      Pupils: Pupils are equal, round, and reactive to light. Cardiovascular:      Rate and Rhythm: Normal rate and regular rhythm. Pulses: Normal pulses. Heart sounds: Normal heart sounds. Pulmonary:      Effort: Pulmonary effort is normal. No respiratory distress.       Breath sounds: Normal breath sounds. No wheezing, rhonchi or rales. Abdominal:      General: A surgical scar is present. Bowel sounds are decreased. There is distension. Tenderness: There is abdominal tenderness (Minimal). Hernia: A hernia (reducible) is present. Hernia is present in the umbilical area. Musculoskeletal:         General: No swelling, tenderness or deformity. Normal range of motion. Cervical back: Normal range of motion and neck supple. No muscular tenderness. Right lower leg: No edema. Left lower leg: No edema. Skin:     General: Skin is warm and dry. Findings: No bruising or lesion. Neurological:      Mental Status: He is alert and oriented to person, place, and time. Motor: Weakness (Diffuse) present. Psychiatric:         Mood and Affect: Mood normal.         Behavior: Behavior normal.         Thought Content:  Thought content normal.           Medications:      sodium chloride 125 mL/hr at 12/12/22 0355    sodium chloride        pantoprazole (PROTONIX) 40 mg injection  40 mg IntraVENous Daily    sodium chloride flush  5-40 mL IntraVENous 2 times per day    enoxaparin  30 mg SubCUTAneous BID    [Held by provider] aspirin  81 mg Oral Once per day on Mon Wed Fri    [Held by provider] pantoprazole  20 mg Oral QAM AC    [Held by provider] tamsulosin  0.4 mg Oral Daily     sodium chloride flush, sodium chloride, ondansetron **OR** ondansetron, polyethylene glycol, acetaminophen **OR** acetaminophen, morphine  Diet NPO Exceptions are: Sips of Water with Meds     Lab and other Data:     Recent Labs     12/11/22 0215 12/11/22  0751 12/12/22  0256   WBC 7.8 7.0 8.8   HGB 15.5 14.0 13.9*    224 233       Recent Labs     12/11/22  0215 12/11/22  0751 12/12/22  0256   * 131* 134*   K 4.3 4.3 4.3   CL 91* 94* 98   CO2 23 26 24   BUN 16 14 14   CREATININE 0.7 0.9 0.8   GLUCOSE 139* 129* 101       Recent Labs     12/11/22 0215   AST 65*   ALT 38   BILITOT 0.5 ALKPHOS 67       Troponin T:   Recent Labs     12/11/22  0215   TROPONINI <0.01       Pro-BNP: No results for input(s): BNP in the last 72 hours. INR: No results for input(s): INR in the last 72 hours. UA:  Recent Labs     12/11/22  0230   COLORU YELLOW   PHUR 6.0   CLARITYU Clear   SPECGRAV 1.009   LEUKOCYTESUR Negative   UROBILINOGEN 0.2   BILIRUBINUR Negative   BLOODU Negative   GLUCOSEU Negative       A1C: No results for input(s): LABA1C in the last 72 hours. ABG:No results for input(s): PHART, KAO1YGE, PO2ART, ZHF1KJY, BEART, HGBAE, T8YJWMBQ, CARBOXHGBART in the last 72 hours. RAD:   CT ABDOMEN PELVIS W IV CONTRAST Additional Contrast? None    Result Date: 12/11/2022  1. Small bowel obstruction. Transition point is suspected to be within the right hemiabdomen (series 2 image 50). Upstream bowel loops measure up to 4.5 cm. Please note this is adjacent to suture material within the right aspect of the transverse colon and stomach and may relate to adhesions. 2. No evidence of perforation or free air. Mild mesenteric ascites which may be reactive in nature. 3. No other acute findings. 4. Incidental findings as described in the body of report. Communications:12/11/22 04:52 Verify Receipt Verified receipt with YANETH Vela. on 12/11 04:52 (-06:00)Electronically signed by Latha Guillen MD on 12-11-22 at 0420         Assessment/Plan   Principal Problem:    SBO (small bowel obstruction) ([address]   -General surgery following-ongoing recommendations appreciated   -Continue IVF's   -continue NGT to LIS   -Strict NPO   -Daily labs    -Strict I&O   -Monitor vitals    -VTE prophylaxis   -Pain and nausea management   -Supportive care    Active Problems:    Hyponatremia   -improving   -Today's Na-134   -continue IVF's   -Daily labs   -Monitor    Covid-19   -Droplet precautions   -Monitor   -Relatively asymptomatic    Resolved Problems:    * No resolved hospital problems.  *      DVT Prophylaxis: Lovenox    GI prophylaxis:  Protonix    Disposition: TBD    CHERYL Figueroa, 12/12/2022 4:46 PM

## 2022-12-12 NOTE — PROGRESS NOTES
[facility] General Surgery Progress Note    Chief Complaint:   Chief Complaint   Patient presents with    Abdominal Pain     Pt reports abdominal pain. Emesis     Pt reports vomiting. Pt reports diarrhea this morning. Pt states that he has not had a normal BM in a week. SUBJECTIVE:  Mr. Briseida Bassett is a 76 y.o. male is seen and examined at bedside. Denies flatus. States he feels about the same. NGT on LWIS with 250 in cannister. OBJECTIVE:  BP (!) 163/84   Pulse 70   Temp 96.8 °F (36 °C) (Temporal)   Resp 20   Ht 6' 1\" (1.854 m)   Wt 225 lb (102.1 kg)   SpO2 95%   BMI 29.69 kg/m²   CONSTITUTIONAL: Alert, appropriate, no acute distress  SKIN: warm, dry with no rashes or lesions  HEENT: NCAT, Non icteric, PERR. Trachea Midline. CHEST/LUNGS: CTA bilaterally. Normal respiratory effort. CARDIOVASCULAR: RRR, No edema. ABDOMEN: soft, distended, appropriately TTP, +BS though decreased. Umbilical incisional hernia does not contain bowel, reducible  NEUROLOGIC: CN II-XI grossly intact, no motor or sensory deficits   MUSCULOSKELETAL: No clubbing or cyanosis. PSYCHIATRIC:  Normal Mood and Affect. Alert and Oriented. Labs:  CBC:   Recent Labs     12/11/22 0215 12/11/22  0751 12/12/22  0256   WBC 7.8 7.0 8.8   HGB 15.5 14.0 13.9*    224 233     BMP:    Recent Labs     12/11/22  0215 12/11/22  0751 12/12/22  0256   * 131* 134*   K 4.3 4.3 4.3   CL 91* 94* 98   CO2 23 26 24   BUN 16 14 14   CREATININE 0.7 0.9 0.8   GLUCOSE 139* 129* 101       ASSESSMENT:  Principal Problem:    SBO (small bowel obstruction) (HCC)  Active Problems:    Hyponatremia  Resolved Problems:    * No resolved hospital problems. *      PLAN:  Awaiting AXR read. Upon my review, continued dilated small bowel loops with air fluid levels consistent with continued SBO. Continue conservative treatment with NGT. Encouraged Ambulation and OOBTC. They note understanding. Repeat AXR tomorrow.     Tabitha Fountain DO   Electronically Signed on 12/12/2022 at 11:35 AM

## 2022-12-13 ENCOUNTER — APPOINTMENT (OUTPATIENT)
Dept: GENERAL RADIOLOGY | Age: 77
End: 2022-12-13
Payer: MEDICARE

## 2022-12-13 LAB
ANION GAP SERPL CALCULATED.3IONS-SCNC: 10 MMOL/L (ref 7–19)
BUN BLDV-MCNC: 16 MG/DL (ref 8–23)
CALCIUM SERPL-MCNC: 8.7 MG/DL (ref 8.8–10.2)
CHLORIDE BLD-SCNC: 97 MMOL/L (ref 98–111)
CO2: 26 MMOL/L (ref 22–29)
CREAT SERPL-MCNC: 0.8 MG/DL (ref 0.5–1.2)
GFR SERPL CREATININE-BSD FRML MDRD: >60 ML/MIN/{1.73_M2}
GLUCOSE BLD-MCNC: 98 MG/DL (ref 74–109)
HCT VFR BLD CALC: 39.7 % (ref 42–52)
HEMOGLOBIN: 13.9 G/DL (ref 14–18)
MAGNESIUM: 1.8 MG/DL (ref 1.6–2.4)
MCH RBC QN AUTO: 33.4 PG (ref 27–31)
MCHC RBC AUTO-ENTMCNC: 35 G/DL (ref 33–37)
MCV RBC AUTO: 95.4 FL (ref 80–94)
PDW BLD-RTO: 11.5 % (ref 11.5–14.5)
PLATELET # BLD: 237 K/UL (ref 130–400)
PMV BLD AUTO: 8.4 FL (ref 9.4–12.4)
POTASSIUM SERPL-SCNC: 4.2 MMOL/L (ref 3.5–5)
RBC # BLD: 4.16 M/UL (ref 4.7–6.1)
SODIUM BLD-SCNC: 133 MMOL/L (ref 136–145)
WBC # BLD: 8.8 K/UL (ref 4.8–10.8)

## 2022-12-13 PROCEDURE — 71045 X-RAY EXAM CHEST 1 VIEW: CPT

## 2022-12-13 PROCEDURE — 1210000000 HC MED SURG R&B

## 2022-12-13 PROCEDURE — 74019 RADEX ABDOMEN 2 VIEWS: CPT

## 2022-12-13 PROCEDURE — 85027 COMPLETE CBC AUTOMATED: CPT

## 2022-12-13 PROCEDURE — 99233 SBSQ HOSP IP/OBS HIGH 50: CPT | Performed by: SURGERY

## 2022-12-13 PROCEDURE — 6360000002 HC RX W HCPCS: Performed by: NURSE PRACTITIONER

## 2022-12-13 PROCEDURE — 83735 ASSAY OF MAGNESIUM: CPT

## 2022-12-13 PROCEDURE — 36415 COLL VENOUS BLD VENIPUNCTURE: CPT

## 2022-12-13 PROCEDURE — C9113 INJ PANTOPRAZOLE SODIUM, VIA: HCPCS | Performed by: NURSE PRACTITIONER

## 2022-12-13 PROCEDURE — 80048 BASIC METABOLIC PNL TOTAL CA: CPT

## 2022-12-13 PROCEDURE — 2580000003 HC RX 258: Performed by: NURSE PRACTITIONER

## 2022-12-13 PROCEDURE — 6360000002 HC RX W HCPCS: Performed by: HOSPITALIST

## 2022-12-13 RX ADMIN — ENOXAPARIN SODIUM 30 MG: 100 INJECTION SUBCUTANEOUS at 09:33

## 2022-12-13 RX ADMIN — SODIUM CHLORIDE, PRESERVATIVE FREE 40 MG: 5 INJECTION INTRAVENOUS at 09:33

## 2022-12-13 RX ADMIN — ENOXAPARIN SODIUM 30 MG: 100 INJECTION SUBCUTANEOUS at 21:19

## 2022-12-13 RX ADMIN — ONDANSETRON 4 MG: 2 INJECTION INTRAMUSCULAR; INTRAVENOUS at 13:41

## 2022-12-13 RX ADMIN — ONDANSETRON 4 MG: 2 INJECTION INTRAMUSCULAR; INTRAVENOUS at 06:36

## 2022-12-13 RX ADMIN — MORPHINE SULFATE 2 MG: 2 INJECTION, SOLUTION INTRAMUSCULAR; INTRAVENOUS at 13:41

## 2022-12-13 RX ADMIN — MORPHINE SULFATE 2 MG: 2 INJECTION, SOLUTION INTRAMUSCULAR; INTRAVENOUS at 10:21

## 2022-12-13 RX ADMIN — MORPHINE SULFATE 2 MG: 2 INJECTION, SOLUTION INTRAMUSCULAR; INTRAVENOUS at 21:38

## 2022-12-13 RX ADMIN — ONDANSETRON 4 MG: 2 INJECTION INTRAMUSCULAR; INTRAVENOUS at 21:38

## 2022-12-13 RX ADMIN — MORPHINE SULFATE 2 MG: 2 INJECTION, SOLUTION INTRAMUSCULAR; INTRAVENOUS at 18:05

## 2022-12-13 RX ADMIN — MORPHINE SULFATE 2 MG: 2 INJECTION, SOLUTION INTRAMUSCULAR; INTRAVENOUS at 06:34

## 2022-12-13 ASSESSMENT — PAIN DESCRIPTION - ORIENTATION
ORIENTATION: RIGHT
ORIENTATION: UPPER
ORIENTATION: UPPER

## 2022-12-13 ASSESSMENT — PAIN DESCRIPTION - LOCATION
LOCATION: ABDOMEN

## 2022-12-13 ASSESSMENT — PAIN SCALES - GENERAL
PAINLEVEL_OUTOF10: 8
PAINLEVEL_OUTOF10: 7
PAINLEVEL_OUTOF10: 7

## 2022-12-13 ASSESSMENT — PAIN - FUNCTIONAL ASSESSMENT
PAIN_FUNCTIONAL_ASSESSMENT: ACTIVITIES ARE NOT PREVENTED

## 2022-12-13 ASSESSMENT — PAIN DESCRIPTION - DESCRIPTORS
DESCRIPTORS: CRAMPING
DESCRIPTORS: ACHING;CRAMPING
DESCRIPTORS: PRESSURE;TIGHTNESS
DESCRIPTORS: ACHING;CRAMPING
DESCRIPTORS: ACHING;CRAMPING

## 2022-12-13 NOTE — PLAN OF CARE
Problem: ABCDS Injury Assessment  Goal: Absence of physical injury  12/13/2022 0720 by Wilbur Shanks RN  Outcome: Progressing  12/12/2022 2334 by Kaylee Dubon RN  Outcome: Progressing     Problem: Pain  Goal: Verbalizes/displays adequate comfort level or baseline comfort level  12/13/2022 0720 by Wilbur Shanks RN  Outcome: Progressing  12/12/2022 2334 by Kaylee Dubon RN  Outcome: Progressing     Problem: Gastrointestinal - Adult  Goal: Minimal or absence of nausea and vomiting  12/13/2022 0720 by Wilbur Shanks RN  Outcome: Progressing  12/12/2022 2334 by Kaylee Dubon RN  Outcome: Progressing  Goal: Maintains or returns to baseline bowel function  12/13/2022 0720 by Wilbur Shanks RN  Outcome: Progressing  12/12/2022 2334 by Kaylee Dubon RN  Outcome: Progressing  Goal: Maintains adequate nutritional intake  12/13/2022 0720 by Wilbur Shanks RN  Outcome: Progressing  12/12/2022 2334 by Kaylee Dubon RN  Outcome: Progressing  Goal: Establish and maintain optimal ostomy function  12/13/2022 0720 by Wilbur Shanks RN  Outcome: Progressing  12/12/2022 2334 by Kaylee Dubon RN  Outcome: Progressing     Problem: Infection - Adult  Goal: Absence of infection at discharge  12/13/2022 0720 by Wilbur Shanks RN  Outcome: Progressing  12/12/2022 2334 by Kaylee Dubon RN  Outcome: Progressing  Goal: Absence of infection during hospitalization  12/13/2022 0720 by Wilbur Shanks RN  Outcome: Progressing  12/12/2022 2334 by Kaylee Dubon RN  Outcome: Progressing  Goal: Absence of fever/infection during anticipated neutropenic period  12/13/2022 0720 by Wilbur Shanks RN  Outcome: Progressing  12/12/2022 2334 by Kaylee Dubon RN  Outcome: Progressing

## 2022-12-13 NOTE — PROGRESS NOTES
Patient attempted to have BM but was unsuccessful.  Electronically signed by Basilio Contreras RN on 12/13/2022 at 5:47 PM

## 2022-12-13 NOTE — PLAN OF CARE
Problem: ABCDS Injury Assessment  Goal: Absence of physical injury  12/12/2022 2334 by Arin Frey RN  Outcome: Progressing  12/12/2022 1602 by Khai Herrera RN  Outcome: Progressing     Problem: Pain  Goal: Verbalizes/displays adequate comfort level or baseline comfort level  12/12/2022 2334 by Arin Frey RN  Outcome: Progressing  12/12/2022 1602 by Khai Herrera RN  Outcome: Progressing     Problem: Gastrointestinal - Adult  Goal: Minimal or absence of nausea and vomiting  12/12/2022 2334 by Arin Frey RN  Outcome: Progressing  12/12/2022 1602 by Khai Herrera RN  Outcome: Progressing  Goal: Maintains or returns to baseline bowel function  12/12/2022 2334 by Arin Frey RN  Outcome: Progressing  12/12/2022 1602 by Khai Herrera RN  Outcome: Progressing  Goal: Maintains adequate nutritional intake  12/12/2022 2334 by Arin Frey RN  Outcome: Progressing  12/12/2022 1602 by Khai Herrera RN  Outcome: Progressing  Goal: Establish and maintain optimal ostomy function  12/12/2022 2334 by Arin Frey RN  Outcome: Progressing  12/12/2022 1602 by Khai Herrera RN  Outcome: Progressing     Problem: Infection - Adult  Goal: Absence of infection at discharge  12/12/2022 2334 by Arin Frey RN  Outcome: Progressing  12/12/2022 1602 by Khai Herrera RN  Outcome: Progressing  Goal: Absence of infection during hospitalization  12/12/2022 2334 by Arin Frey RN  Outcome: Progressing  12/12/2022 1602 by Khai Herrera RN  Outcome: Progressing  Goal: Absence of fever/infection during anticipated neutropenic period  12/12/2022 2334 by Arin Frey RN  Outcome: Progressing  12/12/2022 1602 by Khai Herrera RN  Outcome: Progressing

## 2022-12-13 NOTE — PROGRESS NOTES
Patient has ambulated several times in hallway today.  Electronically signed by Jonah Olivier RN on 12/13/2022 at 5:48 PM

## 2022-12-13 NOTE — PROGRESS NOTES
[id number] [facility]      Patient:  Kulwant Ross  YOB: 1945  Date of Service: 12/13/2022  MRN: [id number]   Acct: [de-identified]   Primary Care Physician: CHERYL Malagon - COURTNEY  Advance Directive: Full Code  Admit Date: 12/11/2022       Hospital Day: 2  Portions of this note have been copied forward, however, changed to reflect the most current clinical status of this patient. CHIEF COMPLAINT Abdominal pain    SUBJECTIVE:  Abdominal pain improved, but did recently require IV pain meds. Remains afebrile. Remains on RA. Reports passing minimal flatus today. CUMULATIVE HOSPITAL STAY:  The patient is a 75 yo male with a PMH of pacer, BPH, HTN, HLD, GIST with gastrectomy who presented to [facility] ED on 12/11/2022 with c/o epigastric pain, N/V, and diarrhea for ~1 week that was worsening in nature. He additionally reported a URI that had been improving until the day prior to admission. Further ED work-up revealed that the pt was + for Covid 19, CT of the abdomen and pelvis showed SBO with transition point suspected to be within the hemiabdomen. Upstream bowel loops measured up to 4.5cm. Labs revealed hyponatremia with an Na-128 and Cl-91. The patient was admitted to hospital medicine due to SBO and hyponatremia with a general surgery consult. An NGT was placed to [facility] and general surgery plans to treat conservatively upon admission. NGT remains at LIS and the patient remains strict NPO. IVF's continue with electrolytes being relatively [id number], Cl-97. CBC remains unchanged. NGT advanced per KUB reading and recommendations. General surgery plans to perform exploratory laparotomy on 12/14/2022 if no passage of flatus. Review of Systems:   Review of Systems   Constitutional:  Negative for chills, diaphoresis, fatigue and fever. HENT:  Negative for congestion and ear pain. Eyes:  Negative for visual disturbance. Respiratory:  Negative for cough, shortness of breath and wheezing. effort is normal. No respiratory distress. Breath sounds: Normal breath sounds. No wheezing, rhonchi or rales. Abdominal:      General: A surgical scar is present. Bowel sounds are decreased. There is distension. Tenderness: There is abdominal tenderness (Minimal). Hernia: A hernia (reducible) is present. Hernia is present in the umbilical area. Musculoskeletal:         General: No swelling, tenderness or deformity. Normal range of motion. Cervical back: Normal range of motion and neck supple. No muscular tenderness. Right lower leg: No edema. Left lower leg: No edema. Skin:     General: Skin is warm and dry. Findings: No bruising or lesion. Neurological:      Mental Status: He is alert and oriented to person, place, and time. Motor: Weakness (Diffuse) present. Psychiatric:         Mood and Affect: Mood normal.         Behavior: Behavior normal.         Thought Content:  Thought content normal.           Medications:      sodium chloride 100 mL/hr at 12/12/22 1822    sodium chloride        pantoprazole (PROTONIX) 40 mg injection  40 mg IntraVENous Daily    fluticasone  1 spray Each Nostril Daily    sodium chloride flush  5-40 mL IntraVENous 2 times per day    enoxaparin  30 mg SubCUTAneous BID    [Held by provider] aspirin  81 mg Oral Once per day on Mon Wed Fri    [Held by provider] pantoprazole  20 mg Oral QAM AC    [Held by provider] tamsulosin  0.4 mg Oral Daily     sodium chloride flush, sodium chloride, ondansetron **OR** ondansetron, polyethylene glycol, acetaminophen **OR** acetaminophen, morphine  Diet NPO Exceptions are: Sips of Water with Meds     Lab and other Data:     Recent Labs     12/11/22  0751 12/12/22 0256 12/13/22  0235   WBC 7.0 8.8 8.8   HGB 14.0 13.9* 13.9*    233 237       Recent Labs     12/11/22  0751 12/12/22  0256 12/13/22  0235   * 134* 133*   K 4.3 4.3 4.2   CL 94* 98 97*   CO2 26 24 26   BUN 14 14 16   CREATININE 0.9 0.8 0.8   GLUCOSE 129* 101 98       Recent Labs     12/11/22  0215   AST 65*   ALT 38   BILITOT 0.5   ALKPHOS 67       Troponin T:   Recent Labs     12/11/22 0215   TROPONINI <0.01       Pro-BNP: No results for input(s): BNP in the last 72 hours. INR: No results for input(s): INR in the last 72 hours. UA:  Recent Labs     12/11/22  0230   COLORU YELLOW   PHUR 6.0   CLARITYU Clear   SPECGRAV 1.009   LEUKOCYTESUR Negative   UROBILINOGEN 0.2   BILIRUBINUR Negative   BLOODU Negative   GLUCOSEU Negative       A1C: No results for input(s): LABA1C in the last 72 hours. ABG:No results for input(s): PHART, ECW1ODH, PO2ART, SSR9OEK, BEART, HGBAE, Q0PONINK, CARBOXHGBART in the last 72 hours. RAD:   CT ABDOMEN PELVIS W IV CONTRAST Additional Contrast? None    Result Date: 12/11/2022  1. Small bowel obstruction. Transition point is suspected to be within the right hemiabdomen (series 2 image 50). Upstream bowel loops measure up to 4.5 cm. Please note this is adjacent to suture material within the right aspect of the transverse colon and stomach and may relate to adhesions. 2. No evidence of perforation or free air. Mild mesenteric ascites which may be reactive in nature. 3. No other acute findings. 4. Incidental findings as described in the body of report. Communications:12/11/22 04:52 Verify Receipt Verified receipt with ER Dallas Daly.    on 12/11 04:52 (-06:00)Electronically signed by Mario Galarza MD on 12-11-22 at 0428         Assessment/Plan   Principal Problem:    SBO (small bowel obstruction) ([facility] [address]   -General surgery following-ongoing recommendations appreciated   -Plan ex lap tomorrow if no flatus passed   -Continue IVF's   -continue NGT to LIS   -Strict NPO   -Daily labs    -Strict I&O   -Monitor vitals    -VTE prophylaxis   -Pain and nausea management   -Supportive care    Active Problems:    Hyponatremia   -stable   -Today's Na-133   -continue IVF's   -Daily labs   -Monitor    Covid-19   -remains on RA   -Droplet precautions   -Monitor   -Relatively asymptomatic    Resolved Problems:    * No resolved hospital problems.  *      DVT Prophylaxis: Lovenox    GI prophylaxis:  Protonix    Disposition: CHERYL Vallejo, 12/13/2022 10:37 AM

## 2022-12-13 NOTE — PROGRESS NOTES
[facility] General Surgery Progress Note    Chief Complaint:   Chief Complaint   Patient presents with    Abdominal Pain     Pt reports abdominal pain. Emesis     Pt reports vomiting. Pt reports diarrhea this morning. Pt states that he has not had a normal BM in a week. SUBJECTIVE:  Mr. Paradise Cooper is a 76 y.o. male is seen and examined at bedside. Denies flatus. States he feels about the same. NGT on LWIS with 500 in cannister. He thinks he has a little less pain overnight. OBJECTIVE:  BP (!) 152/80   Pulse 73   Temp 97.5 °F (36.4 °C) (Temporal)   Resp 20   Ht 6' 1\" (1.854 m)   Wt 225 lb (102.1 kg)   SpO2 95%   BMI 29.69 kg/m²   CONSTITUTIONAL: Alert, appropriate, no acute distress  SKIN: warm, dry with no rashes or lesions  HEENT: NCAT, Non icteric, PERR. Trachea Midline. CHEST/LUNGS: CTA bilaterally. Normal respiratory effort. CARDIOVASCULAR: RRR, No edema. ABDOMEN: softer today and less distended, appropriately TTP, +BS though decreased. Umbilical incisional hernia does not contain bowel, reducible  NEUROLOGIC: CN II-XI grossly intact, no motor or sensory deficits   MUSCULOSKELETAL: No clubbing or cyanosis. PSYCHIATRIC:  Normal Mood and Affect. Alert and Oriented. Labs:  CBC:   Recent Labs     12/11/22  0751 12/12/22 0256 12/13/22  0235   WBC 7.0 8.8 8.8   HGB 14.0 13.9* 13.9*    233 237     BMP:    Recent Labs     12/11/22  0751 12/12/22 0256 12/13/22  0235   * 134* 133*   K 4.3 4.3 4.2   CL 94* 98 97*   CO2 26 24 26   BUN 14 14 16   CREATININE 0.9 0.8 0.8   GLUCOSE 129* 101 98     12/13/2022 AXR   Impression:     NG tube with sidehole at the GE junction and distal tip in the proximal stomach. Recommend interval advancement at least 4 to 5 cm. Redemonstration of multiple gas dilated loops of small bowel throughout the   abdomen, compatible with SBO.     12/12/2022 AXR   Impression:     Acute small bowel obstruction. Recommendation:   Follow up as clinically indicated. Dictated and Electronically Signed by Michael Briseno MD at 13-Dec-2022 07:07:08 AM           ASSESSMENT:  Principal Problem:    SBO (small bowel obstruction) (HCC)  Active Problems:    Hyponatremia    COVID-19  Resolved Problems:    * No resolved hospital problems. *      PLAN:  Discussed with Mr and Mrs. Winters that he does not appear to be improving. Will advance his NGT as recommended on imaging today. Upon review of imaging, continued presence of bowel obstruction. If no improvement of flatus by tomorrow, will need exploration. They note understanding and wish to proceed.     Paulette Rudolph DO   Electronically Signed on 12/13/2022 at 11:19 AM

## 2022-12-13 NOTE — PROGRESS NOTES
Advanced patients NG tube 5 cm to 64 cm per recommendation of abdominal xray results. Repeat abdominal xray obtained to confirm placement.  Electronically signed by Marvin Alcantara RN on 12/13/2022 at 5:49 PM

## 2022-12-14 ENCOUNTER — ANESTHESIA (OUTPATIENT)
Dept: OPERATING ROOM | Age: 77
End: 2022-12-14
Payer: MEDICARE

## 2022-12-14 ENCOUNTER — APPOINTMENT (OUTPATIENT)
Dept: GENERAL RADIOLOGY | Age: 77
End: 2022-12-14
Payer: MEDICARE

## 2022-12-14 ENCOUNTER — ANESTHESIA EVENT (OUTPATIENT)
Dept: OPERATING ROOM | Age: 77
End: 2022-12-14
Payer: MEDICARE

## 2022-12-14 LAB
ABO/RH: NORMAL
ANION GAP SERPL CALCULATED.3IONS-SCNC: 11 MMOL/L (ref 7–19)
ANTIBODY SCREEN: NORMAL
BASOPHILS ABSOLUTE: 0 K/UL (ref 0–0.2)
BASOPHILS RELATIVE PERCENT: 0.2 % (ref 0–1)
BUN BLDV-MCNC: 16 MG/DL (ref 8–23)
CALCIUM SERPL-MCNC: 8.8 MG/DL (ref 8.8–10.2)
CHLORIDE BLD-SCNC: 99 MMOL/L (ref 98–111)
CO2: 24 MMOL/L (ref 22–29)
CREAT SERPL-MCNC: 0.7 MG/DL (ref 0.5–1.2)
EOSINOPHILS ABSOLUTE: 0.1 K/UL (ref 0–0.6)
EOSINOPHILS RELATIVE PERCENT: 1.4 % (ref 0–5)
GFR SERPL CREATININE-BSD FRML MDRD: >60 ML/MIN/{1.73_M2}
GLUCOSE BLD-MCNC: 95 MG/DL (ref 74–109)
HCT VFR BLD CALC: 37.4 % (ref 42–52)
HEMOGLOBIN: 13.2 G/DL (ref 14–18)
IMMATURE GRANULOCYTES #: 0 K/UL
LYMPHOCYTES ABSOLUTE: 0.9 K/UL (ref 1.1–4.5)
LYMPHOCYTES RELATIVE PERCENT: 9.2 % (ref 20–40)
MCH RBC QN AUTO: 33.7 PG (ref 27–31)
MCHC RBC AUTO-ENTMCNC: 35.3 G/DL (ref 33–37)
MCV RBC AUTO: 95.4 FL (ref 80–94)
MONOCYTES ABSOLUTE: 0.7 K/UL (ref 0–0.9)
MONOCYTES RELATIVE PERCENT: 6.9 % (ref 0–10)
NEUTROPHILS ABSOLUTE: 8.4 K/UL (ref 1.5–7.5)
NEUTROPHILS RELATIVE PERCENT: 81.9 % (ref 50–65)
PDW BLD-RTO: 11.3 % (ref 11.5–14.5)
PLATELET # BLD: 240 K/UL (ref 130–400)
PMV BLD AUTO: 8.4 FL (ref 9.4–12.4)
POTASSIUM REFLEX MAGNESIUM: 4.1 MMOL/L (ref 3.5–5)
RBC # BLD: 3.92 M/UL (ref 4.7–6.1)
SODIUM BLD-SCNC: 134 MMOL/L (ref 136–145)
WBC # BLD: 10.3 K/UL (ref 4.8–10.8)

## 2022-12-14 PROCEDURE — 2720000010 HC SURG SUPPLY STERILE: Performed by: SURGERY

## 2022-12-14 PROCEDURE — 7100000001 HC PACU RECOVERY - ADDTL 15 MIN: Performed by: SURGERY

## 2022-12-14 PROCEDURE — 88307 TISSUE EXAM BY PATHOLOGIST: CPT

## 2022-12-14 PROCEDURE — 86901 BLOOD TYPING SEROLOGIC RH(D): CPT

## 2022-12-14 PROCEDURE — 6360000002 HC RX W HCPCS: Performed by: SURGERY

## 2022-12-14 PROCEDURE — 36415 COLL VENOUS BLD VENIPUNCTURE: CPT

## 2022-12-14 PROCEDURE — 86850 RBC ANTIBODY SCREEN: CPT

## 2022-12-14 PROCEDURE — 2500000003 HC RX 250 WO HCPCS: Performed by: NURSE ANESTHETIST, CERTIFIED REGISTERED

## 2022-12-14 PROCEDURE — 99233 SBSQ HOSP IP/OBS HIGH 50: CPT | Performed by: SURGERY

## 2022-12-14 PROCEDURE — 1210000000 HC MED SURG R&B

## 2022-12-14 PROCEDURE — 49255 REMOVAL OF OMENTUM: CPT | Performed by: SURGERY

## 2022-12-14 PROCEDURE — 74018 RADEX ABDOMEN 1 VIEW: CPT

## 2022-12-14 PROCEDURE — 2580000003 HC RX 258: Performed by: SURGERY

## 2022-12-14 PROCEDURE — 0DNU0ZZ RELEASE OMENTUM, OPEN APPROACH: ICD-10-PCS | Performed by: SURGERY

## 2022-12-14 PROCEDURE — 2500000003 HC RX 250 WO HCPCS: Performed by: SURGERY

## 2022-12-14 PROCEDURE — 44120 REMOVAL OF SMALL INTESTINE: CPT | Performed by: SURGERY

## 2022-12-14 PROCEDURE — 3700000001 HC ADD 15 MINUTES (ANESTHESIA): Performed by: SURGERY

## 2022-12-14 PROCEDURE — 0DBU0ZZ EXCISION OF OMENTUM, OPEN APPROACH: ICD-10-PCS | Performed by: SURGERY

## 2022-12-14 PROCEDURE — 2580000003 HC RX 258: Performed by: NURSE ANESTHETIST, CERTIFIED REGISTERED

## 2022-12-14 PROCEDURE — 6360000004 HC RX CONTRAST MEDICATION: Performed by: SURGERY

## 2022-12-14 PROCEDURE — 2709999900 HC NON-CHARGEABLE SUPPLY: Performed by: SURGERY

## 2022-12-14 PROCEDURE — 6360000002 HC RX W HCPCS: Performed by: NURSE ANESTHETIST, CERTIFIED REGISTERED

## 2022-12-14 PROCEDURE — 6360000002 HC RX W HCPCS: Performed by: HOSPITALIST

## 2022-12-14 PROCEDURE — 80048 BASIC METABOLIC PNL TOTAL CA: CPT

## 2022-12-14 PROCEDURE — 99999 PR OFFICE/OUTPT VISIT,PROCEDURE ONLY: CPT | Performed by: SURGERY

## 2022-12-14 PROCEDURE — 85025 COMPLETE CBC W/AUTO DIFF WBC: CPT

## 2022-12-14 PROCEDURE — 2580000003 HC RX 258: Performed by: HOSPITALIST

## 2022-12-14 PROCEDURE — C9290 INJ, BUPIVACAINE LIPOSOME: HCPCS | Performed by: SURGERY

## 2022-12-14 PROCEDURE — 3E0333Z INTRODUCTION OF ANTI-INFLAMMATORY INTO PERIPHERAL VEIN, PERCUTANEOUS APPROACH: ICD-10-PCS | Performed by: STUDENT IN AN ORGANIZED HEALTH CARE EDUCATION/TRAINING PROGRAM

## 2022-12-14 PROCEDURE — 88305 TISSUE EXAM BY PATHOLOGIST: CPT

## 2022-12-14 PROCEDURE — 3600000014 HC SURGERY LEVEL 4 ADDTL 15MIN: Performed by: SURGERY

## 2022-12-14 PROCEDURE — C9113 INJ PANTOPRAZOLE SODIUM, VIA: HCPCS | Performed by: NURSE PRACTITIONER

## 2022-12-14 PROCEDURE — 3600000004 HC SURGERY LEVEL 4 BASE: Performed by: SURGERY

## 2022-12-14 PROCEDURE — 2580000003 HC RX 258: Performed by: NURSE PRACTITIONER

## 2022-12-14 PROCEDURE — 86900 BLOOD TYPING SEROLOGIC ABO: CPT

## 2022-12-14 PROCEDURE — 6360000002 HC RX W HCPCS: Performed by: NURSE PRACTITIONER

## 2022-12-14 PROCEDURE — 7100000000 HC PACU RECOVERY - FIRST 15 MIN: Performed by: SURGERY

## 2022-12-14 PROCEDURE — 0DB80ZZ EXCISION OF SMALL INTESTINE, OPEN APPROACH: ICD-10-PCS | Performed by: SURGERY

## 2022-12-14 PROCEDURE — 3700000000 HC ANESTHESIA ATTENDED CARE: Performed by: SURGERY

## 2022-12-14 RX ORDER — MORPHINE SULFATE 4 MG/ML
4 INJECTION, SOLUTION INTRAMUSCULAR; INTRAVENOUS
Status: DISCONTINUED | OUTPATIENT
Start: 2022-12-14 | End: 2022-12-22 | Stop reason: HOSPADM

## 2022-12-14 RX ORDER — HYDROMORPHONE HYDROCHLORIDE 1 MG/ML
0.5 INJECTION, SOLUTION INTRAMUSCULAR; INTRAVENOUS; SUBCUTANEOUS EVERY 5 MIN PRN
Status: COMPLETED | OUTPATIENT
Start: 2022-12-14 | End: 2022-12-14

## 2022-12-14 RX ORDER — FENTANYL CITRATE 50 UG/ML
INJECTION, SOLUTION INTRAMUSCULAR; INTRAVENOUS PRN
Status: DISCONTINUED | OUTPATIENT
Start: 2022-12-14 | End: 2022-12-14 | Stop reason: SDUPTHER

## 2022-12-14 RX ORDER — PROPOFOL 10 MG/ML
INJECTION, EMULSION INTRAVENOUS PRN
Status: DISCONTINUED | OUTPATIENT
Start: 2022-12-14 | End: 2022-12-14 | Stop reason: SDUPTHER

## 2022-12-14 RX ORDER — MORPHINE SULFATE 2 MG/ML
2 INJECTION, SOLUTION INTRAMUSCULAR; INTRAVENOUS
Status: DISCONTINUED | OUTPATIENT
Start: 2022-12-14 | End: 2022-12-22 | Stop reason: HOSPADM

## 2022-12-14 RX ORDER — KETOROLAC TROMETHAMINE 30 MG/ML
15 INJECTION, SOLUTION INTRAMUSCULAR; INTRAVENOUS EVERY 6 HOURS
Status: COMPLETED | OUTPATIENT
Start: 2022-12-14 | End: 2022-12-17

## 2022-12-14 RX ORDER — SODIUM CHLORIDE, SODIUM LACTATE, POTASSIUM CHLORIDE, CALCIUM CHLORIDE 600; 310; 30; 20 MG/100ML; MG/100ML; MG/100ML; MG/100ML
INJECTION, SOLUTION INTRAVENOUS CONTINUOUS
Status: DISCONTINUED | OUTPATIENT
Start: 2022-12-14 | End: 2022-12-20

## 2022-12-14 RX ORDER — ROCURONIUM BROMIDE 10 MG/ML
INJECTION, SOLUTION INTRAVENOUS PRN
Status: DISCONTINUED | OUTPATIENT
Start: 2022-12-14 | End: 2022-12-14 | Stop reason: SDUPTHER

## 2022-12-14 RX ORDER — HYDROMORPHONE HYDROCHLORIDE 1 MG/ML
0.25 INJECTION, SOLUTION INTRAMUSCULAR; INTRAVENOUS; SUBCUTANEOUS EVERY 5 MIN PRN
Status: DISCONTINUED | OUTPATIENT
Start: 2022-12-14 | End: 2022-12-14 | Stop reason: HOSPADM

## 2022-12-14 RX ORDER — ONDANSETRON 2 MG/ML
INJECTION INTRAMUSCULAR; INTRAVENOUS PRN
Status: DISCONTINUED | OUTPATIENT
Start: 2022-12-14 | End: 2022-12-14 | Stop reason: SDUPTHER

## 2022-12-14 RX ORDER — DEXAMETHASONE SODIUM PHOSPHATE 10 MG/ML
INJECTION, SOLUTION INTRAMUSCULAR; INTRAVENOUS PRN
Status: DISCONTINUED | OUTPATIENT
Start: 2022-12-14 | End: 2022-12-14 | Stop reason: SDUPTHER

## 2022-12-14 RX ORDER — SODIUM CHLORIDE 9 MG/ML
INJECTION, SOLUTION INTRAVENOUS PRN
Status: DISCONTINUED | OUTPATIENT
Start: 2022-12-14 | End: 2022-12-14 | Stop reason: HOSPADM

## 2022-12-14 RX ORDER — ONDANSETRON 2 MG/ML
4 INJECTION INTRAMUSCULAR; INTRAVENOUS EVERY 6 HOURS PRN
Status: DISCONTINUED | OUTPATIENT
Start: 2022-12-14 | End: 2022-12-22 | Stop reason: HOSPADM

## 2022-12-14 RX ORDER — SODIUM CHLORIDE, SODIUM LACTATE, POTASSIUM CHLORIDE, AND CALCIUM CHLORIDE .6; .31; .03; .02 G/100ML; G/100ML; G/100ML; G/100ML
500 INJECTION, SOLUTION INTRAVENOUS ONCE
Status: COMPLETED | OUTPATIENT
Start: 2022-12-14 | End: 2022-12-14

## 2022-12-14 RX ORDER — SODIUM CHLORIDE, SODIUM LACTATE, POTASSIUM CHLORIDE, CALCIUM CHLORIDE 600; 310; 30; 20 MG/100ML; MG/100ML; MG/100ML; MG/100ML
INJECTION, SOLUTION INTRAVENOUS CONTINUOUS PRN
Status: DISCONTINUED | OUTPATIENT
Start: 2022-12-14 | End: 2022-12-14 | Stop reason: SDUPTHER

## 2022-12-14 RX ORDER — SODIUM CHLORIDE 0.9 % (FLUSH) 0.9 %
5-40 SYRINGE (ML) INJECTION EVERY 12 HOURS SCHEDULED
Status: DISCONTINUED | OUTPATIENT
Start: 2022-12-14 | End: 2022-12-14 | Stop reason: HOSPADM

## 2022-12-14 RX ORDER — METOPROLOL TARTRATE 5 MG/5ML
5 INJECTION INTRAVENOUS ONCE
Status: COMPLETED | OUTPATIENT
Start: 2022-12-14 | End: 2022-12-14

## 2022-12-14 RX ORDER — DIPHENHYDRAMINE HYDROCHLORIDE 50 MG/ML
12.5 INJECTION INTRAMUSCULAR; INTRAVENOUS
Status: DISCONTINUED | OUTPATIENT
Start: 2022-12-14 | End: 2022-12-14 | Stop reason: HOSPADM

## 2022-12-14 RX ORDER — CLINDAMYCIN PHOSPHATE 900 MG/50ML
900 INJECTION INTRAVENOUS ONCE
Status: COMPLETED | OUTPATIENT
Start: 2022-12-14 | End: 2022-12-14

## 2022-12-14 RX ORDER — ONDANSETRON 4 MG/1
4 TABLET, ORALLY DISINTEGRATING ORAL EVERY 8 HOURS PRN
Status: DISCONTINUED | OUTPATIENT
Start: 2022-12-14 | End: 2022-12-22 | Stop reason: HOSPADM

## 2022-12-14 RX ORDER — SODIUM CHLORIDE 0.9 % (FLUSH) 0.9 %
5-40 SYRINGE (ML) INJECTION PRN
Status: DISCONTINUED | OUTPATIENT
Start: 2022-12-14 | End: 2022-12-14 | Stop reason: HOSPADM

## 2022-12-14 RX ORDER — LIDOCAINE HYDROCHLORIDE 10 MG/ML
INJECTION, SOLUTION INFILTRATION; PERINEURAL PRN
Status: DISCONTINUED | OUTPATIENT
Start: 2022-12-14 | End: 2022-12-14 | Stop reason: SDUPTHER

## 2022-12-14 RX ORDER — CLINDAMYCIN PHOSPHATE 900 MG/50ML
900 INJECTION INTRAVENOUS EVERY 8 HOURS
Status: COMPLETED | OUTPATIENT
Start: 2022-12-15 | End: 2022-12-15

## 2022-12-14 RX ORDER — METOCLOPRAMIDE HYDROCHLORIDE 5 MG/ML
10 INJECTION INTRAMUSCULAR; INTRAVENOUS
Status: DISCONTINUED | OUTPATIENT
Start: 2022-12-14 | End: 2022-12-14 | Stop reason: HOSPADM

## 2022-12-14 RX ADMIN — METOPROLOL TARTRATE 5 MG: 1 INJECTION, SOLUTION INTRAVENOUS at 20:17

## 2022-12-14 RX ADMIN — LIDOCAINE HYDROCHLORIDE 50 MG: 10 INJECTION, SOLUTION INFILTRATION; PERINEURAL at 16:08

## 2022-12-14 RX ADMIN — ONDANSETRON 4 MG: 2 INJECTION INTRAMUSCULAR; INTRAVENOUS at 13:08

## 2022-12-14 RX ADMIN — SODIUM CHLORIDE, PRESERVATIVE FREE 10 ML: 5 INJECTION INTRAVENOUS at 21:27

## 2022-12-14 RX ADMIN — HYDROMORPHONE HYDROCHLORIDE 0.5 MG: 1 INJECTION, SOLUTION INTRAMUSCULAR; INTRAVENOUS; SUBCUTANEOUS at 18:55

## 2022-12-14 RX ADMIN — ROCURONIUM BROMIDE 50 MG: 10 INJECTION, SOLUTION INTRAVENOUS at 16:08

## 2022-12-14 RX ADMIN — SUGAMMADEX 200 MG: 100 INJECTION, SOLUTION INTRAVENOUS at 18:10

## 2022-12-14 RX ADMIN — MORPHINE SULFATE 2 MG: 2 INJECTION, SOLUTION INTRAMUSCULAR; INTRAVENOUS at 13:39

## 2022-12-14 RX ADMIN — FENTANYL CITRATE 50 MCG: 50 INJECTION, SOLUTION INTRAMUSCULAR; INTRAVENOUS at 17:42

## 2022-12-14 RX ADMIN — MORPHINE SULFATE 2 MG: 2 INJECTION, SOLUTION INTRAMUSCULAR; INTRAVENOUS at 05:50

## 2022-12-14 RX ADMIN — FENTANYL CITRATE 50 MCG: 50 INJECTION, SOLUTION INTRAMUSCULAR; INTRAVENOUS at 17:56

## 2022-12-14 RX ADMIN — CLINDAMYCIN PHOSPHATE 900 MG: 900 INJECTION, SOLUTION INTRAVENOUS at 16:58

## 2022-12-14 RX ADMIN — DIATRIZOATE MEGLUMINE AND DIATRIZOATE SODIUM 100 ML: 660; 100 LIQUID ORAL; RECTAL at 10:31

## 2022-12-14 RX ADMIN — SODIUM CHLORIDE, SODIUM LACTATE, POTASSIUM CHLORIDE, AND CALCIUM CHLORIDE: 600; 310; 30; 20 INJECTION, SOLUTION INTRAVENOUS at 16:03

## 2022-12-14 RX ADMIN — DEXAMETHASONE SODIUM PHOSPHATE 10 MG: 10 INJECTION, SOLUTION INTRAMUSCULAR; INTRAVENOUS at 16:19

## 2022-12-14 RX ADMIN — MORPHINE SULFATE 2 MG: 2 INJECTION, SOLUTION INTRAMUSCULAR; INTRAVENOUS at 10:32

## 2022-12-14 RX ADMIN — SODIUM CHLORIDE, POTASSIUM CHLORIDE, SODIUM LACTATE AND CALCIUM CHLORIDE 500 ML: 600; 310; 30; 20 INJECTION, SOLUTION INTRAVENOUS at 20:00

## 2022-12-14 RX ADMIN — MORPHINE SULFATE 4 MG: 4 INJECTION, SOLUTION INTRAMUSCULAR; INTRAVENOUS at 21:25

## 2022-12-14 RX ADMIN — SODIUM CHLORIDE, PRESERVATIVE FREE 40 MG: 5 INJECTION INTRAVENOUS at 10:15

## 2022-12-14 RX ADMIN — SODIUM CHLORIDE, SODIUM LACTATE, POTASSIUM CHLORIDE, AND CALCIUM CHLORIDE: 600; 310; 30; 20 INJECTION, SOLUTION INTRAVENOUS at 17:26

## 2022-12-14 RX ADMIN — HYDROMORPHONE HYDROCHLORIDE 0.5 MG: 1 INJECTION, SOLUTION INTRAMUSCULAR; INTRAVENOUS; SUBCUTANEOUS at 18:36

## 2022-12-14 RX ADMIN — HYDROMORPHONE HYDROCHLORIDE 0.5 MG: 1 INJECTION, SOLUTION INTRAMUSCULAR; INTRAVENOUS; SUBCUTANEOUS at 18:45

## 2022-12-14 RX ADMIN — ROCURONIUM BROMIDE 20 MG: 10 INJECTION, SOLUTION INTRAVENOUS at 16:56

## 2022-12-14 RX ADMIN — KETOROLAC TROMETHAMINE 15 MG: 30 INJECTION, SOLUTION INTRAMUSCULAR; INTRAVENOUS at 23:01

## 2022-12-14 RX ADMIN — ENOXAPARIN SODIUM 30 MG: 100 INJECTION SUBCUTANEOUS at 10:15

## 2022-12-14 RX ADMIN — SODIUM CHLORIDE, PRESERVATIVE FREE 10 ML: 5 INJECTION INTRAVENOUS at 13:09

## 2022-12-14 RX ADMIN — HYDROMORPHONE HYDROCHLORIDE 0.5 MG: 1 INJECTION, SOLUTION INTRAMUSCULAR; INTRAVENOUS; SUBCUTANEOUS at 19:20

## 2022-12-14 RX ADMIN — FENTANYL CITRATE 100 MCG: 50 INJECTION, SOLUTION INTRAMUSCULAR; INTRAVENOUS at 16:08

## 2022-12-14 RX ADMIN — PROPOFOL 120 MG: 10 INJECTION, EMULSION INTRAVENOUS at 16:08

## 2022-12-14 RX ADMIN — FENTANYL CITRATE 50 MCG: 50 INJECTION, SOLUTION INTRAMUSCULAR; INTRAVENOUS at 16:30

## 2022-12-14 RX ADMIN — ONDANSETRON 4 MG: 2 INJECTION INTRAMUSCULAR; INTRAVENOUS at 16:58

## 2022-12-14 RX ADMIN — ENOXAPARIN SODIUM 30 MG: 100 INJECTION SUBCUTANEOUS at 21:25

## 2022-12-14 ASSESSMENT — PAIN SCALES - GENERAL
PAINLEVEL_OUTOF10: 9
PAINLEVEL_OUTOF10: 10
PAINLEVEL_OUTOF10: 8
PAINLEVEL_OUTOF10: 9
PAINLEVEL_OUTOF10: 7
PAINLEVEL_OUTOF10: 9
PAINLEVEL_OUTOF10: 2
PAINLEVEL_OUTOF10: 9

## 2022-12-14 ASSESSMENT — PAIN DESCRIPTION - LOCATION
LOCATION: ABDOMEN

## 2022-12-14 ASSESSMENT — PAIN DESCRIPTION - DESCRIPTORS
DESCRIPTORS: DISCOMFORT
DESCRIPTORS: ACHING
DESCRIPTORS: ACHING
DESCRIPTORS: PRESSURE

## 2022-12-14 ASSESSMENT — PAIN DESCRIPTION - ORIENTATION
ORIENTATION: UPPER
ORIENTATION: MID
ORIENTATION: MID;UPPER

## 2022-12-14 NOTE — PLAN OF CARE
Problem: ABCDS Injury Assessment  Goal: Absence of physical injury  Outcome: Progressing     Problem: Pain  Goal: Verbalizes/displays adequate comfort level or baseline comfort level  Outcome: Progressing     Problem: Gastrointestinal - Adult  Goal: Minimal or absence of nausea and vomiting  Outcome: Progressing  Goal: Maintains or returns to baseline bowel function  Outcome: Progressing  Goal: Maintains adequate nutritional intake  Outcome: Progressing  Goal: Establish and maintain optimal ostomy function  Outcome: Progressing     Problem: Infection - Adult  Goal: Absence of infection at discharge  Outcome: Progressing  Goal: Absence of infection during hospitalization  Outcome: Progressing  Goal: Absence of fever/infection during anticipated neutropenic period  Outcome: Progressing

## 2022-12-14 NOTE — PROGRESS NOTES
[facility]      Patient:  Brian Ceja  YOB: 1945  Date of Service: 12/14/2022  MRN: [id number]   Acct: [de-identified]   Primary Care Physician: CHERYL Grajeda NP  Advance Directive: Full Code  Admit Date: 12/11/2022       Hospital Day: 3  Portions of this note have been copied forward, however, changed to reflect the most current clinical status of this patient. CHIEF COMPLAINT Abdominal pain    SUBJECTIVE:  Abdominal pain remains relatively unchanged and is receiving pain medication during exam. He is passing some flatus. Offers no other complaints or issues. CUMULATIVE HOSPITAL STAY:  The patient is a 75 yo male with a PMH of pacer, BPH, HTN, HLD, GIST with gastrectomy who presented to [facility] ED on 12/11/2022 with c/o epigastric pain, N/V, and diarrhea for ~1 week that was worsening in nature. He additionally reported a URI that had been improving until the day prior to admission. Further ED work-up revealed that the pt was + for Covid 19, CT of the abdomen and pelvis showed SBO with transition point suspected to be within the hemiabdomen. Upstream bowel loops measured up to 4.5cm. Labs revealed hyponatremia with an Na-128 and Cl-91. The patient was admitted to hospital medicine due to SBO and hyponatremia with a general surgery consult. An NGT was placed to [facility] and general surgery plans to treat conservatively upon admission. NGT remains at LIS and the patient remains strict NPO. IVF's continue with electrolytes being relatively [id number]-[id number], Cl-99. CBC remains unchanged. NGT advanced per KUB reading and recommendations on 12/13/2022. He did pass some flatus overnight and received gastrografin and awaiting results. General surgery awaiting KUB to decide on ex lap. Review of Systems:   Review of Systems   Constitutional:  Negative for chills, diaphoresis, fatigue and fever. HENT:  Negative for congestion and ear pain. Eyes:  Negative for visual disturbance. Respiratory:  Negative for cough, shortness of breath and wheezing. Cardiovascular:  Negative for chest pain, palpitations and leg swelling. Gastrointestinal:  Positive for abdominal distention and abdominal pain. Negative for blood in stool, constipation, diarrhea, nausea and vomiting. Endocrine: Negative for cold intolerance and heat intolerance. Genitourinary:  Negative for difficulty urinating, flank pain, frequency and urgency. Musculoskeletal:  Negative for arthralgias and myalgias. Skin:  Negative for color change and wound. Neurological:  Negative for dizziness, syncope, weakness, light-headedness, numbness and headaches. Hematological:  Does not bruise/bleed easily. Psychiatric/Behavioral:  Negative for agitation, confusion and dysphoric mood. 14 point review of systems is negative except as specifically addressed above. Objective:   VITALS:  /81   Pulse 64   Temp 98.4 °F (36.9 °C)   Resp 18   Ht 6' 1\" (1.854 m)   Wt 225 lb (102.1 kg)   SpO2 93%   BMI 29.69 kg/m²   24HR INTAKE/OUTPUT:    Intake/Output Summary (Last 24 hours) at 12/14/2022 1051  Last data filed at 12/14/2022 1285  Gross per 24 hour   Intake 756.23 ml   Output 905 ml   Net -148.77 ml         Physical Exam  Vitals and nursing note reviewed. Constitutional:       General: He is not in acute distress. Appearance: Normal appearance. He is ill-appearing. HENT:      Head: Normocephalic and atraumatic. Right Ear: External ear normal.      Left Ear: External ear normal.      Nose: Nose normal.      Comments: NGT to right nares LIS-brown drainage     Mouth/Throat:      Mouth: Mucous membranes are moist.   Eyes:      Extraocular Movements: Extraocular movements intact. Conjunctiva/sclera: Conjunctivae normal.      Pupils: Pupils are equal, round, and reactive to light. Cardiovascular:      Rate and Rhythm: Normal rate and regular rhythm. Pulses: Normal pulses.       Heart sounds: Normal heart sounds. Pulmonary:      Effort: Pulmonary effort is normal. No respiratory distress. Breath sounds: Normal breath sounds. No wheezing, rhonchi or rales. Abdominal:      General: A surgical scar is present. Bowel sounds are decreased. There is distension. Tenderness: There is abdominal tenderness (Minimal). Hernia: A hernia (reducible) is present. Hernia is present in the umbilical area. Musculoskeletal:         General: No swelling, tenderness or deformity. Normal range of motion. Cervical back: Normal range of motion and neck supple. No muscular tenderness. Right lower leg: No edema. Left lower leg: No edema. Skin:     General: Skin is warm and dry. Findings: No bruising or lesion. Neurological:      Mental Status: He is alert and oriented to person, place, and time. Motor: Weakness (Diffuse) present. Psychiatric:         Mood and Affect: Mood normal.         Behavior: Behavior normal.         Thought Content:  Thought content normal.           Medications:      sodium chloride 100 mL/hr at 12/12/22 1822    sodium chloride        pantoprazole (PROTONIX) 40 mg injection  40 mg IntraVENous Daily    fluticasone  1 spray Each Nostril Daily    sodium chloride flush  5-40 mL IntraVENous 2 times per day    enoxaparin  30 mg SubCUTAneous BID    [Held by provider] aspirin  81 mg Oral Once per day on Mon Wed Fri    [Held by provider] pantoprazole  20 mg Oral QAM AC    [Held by provider] tamsulosin  0.4 mg Oral Daily     sodium chloride flush, sodium chloride, ondansetron **OR** ondansetron, polyethylene glycol, acetaminophen **OR** acetaminophen, morphine  Diet NPO Exceptions are: Sips of Water with Meds     Lab and other Data:     Recent Labs     12/12/22  0256 12/13/22  0235 12/14/22  0829   WBC 8.8 8.8 10.3   HGB 13.9* 13.9* 13.2*    237 240       Recent Labs     12/12/22  0256 12/13/22  0235 12/14/22  0829   * 133* 134*   K 4.3 4.2 4.1   CL

## 2022-12-14 NOTE — ANESTHESIA PRE PROCEDURE
Department of Anesthesiology  Preprocedure Note       Name:  Kate Gan   Age:  76 y.o.  :  1945                                          MRN:  [id number]         Date:  2022      Surgeon: Soni Butler):  Shira Hurley DO    Procedure: Procedure(s):  LAPAROTOMY EXPLORATORY    Medications prior to admission:   Prior to Admission medications    Medication Sig Start Date End Date Taking? Authorizing Provider   tamsulosin (FLOMAX) 0.4 MG capsule TAKE 1 CAPSULE EVERY DAY 22   Historical Provider, MD   zinc 50 MG TABS tablet Take 50 mg by mouth daily    Historical Provider, MD   lisinopril (PRINIVIL;ZESTRIL) 10 MG tablet Take 10 mg by mouth daily    Historical Provider, MD   lovastatin (MEVACOR) 40 MG tablet Take 40 mg by mouth nightly    Historical Provider, MD   aspirin 81 MG tablet Take 81 mg by mouth three times a week     Historical Provider, MD   omeprazole (PRILOSEC) 40 MG capsule Take 1 capsule by mouth daily 16   Historical Provider, MD   Multiple Vitamins-Minerals (THERAPEUTIC MULTIVITAMIN-MINERALS) tablet Take 1 tablet by mouth daily.     Historical Provider, MD   Cholecalciferol (VITAMIN D3) 2000 UNITS CAPS Take by mouth daily     Historical Provider, MD       Current medications:    Current Facility-Administered Medications   Medication Dose Route Frequency Provider Last Rate Last Admin    pantoprazole (PROTONIX) 40 mg in sodium chloride (PF) 0.9 % 10 mL injection  40 mg IntraVENous Daily Dayana Nadyaman, APRN   40 mg at 22 1015    fluticasone (FLONASE) 50 MCG/ACT nasal spray 1 spray  1 spray Each Nostril Daily Elisa Christian MD        0.9 % sodium chloride infusion   IntraVENous Continuous Dayana Nadyaman, APRN 100 mL/hr at 22 1822 Rate Change at 22 1822    sodium chloride flush 0.9 % injection 5-40 mL  5-40 mL IntraVENous 2 times per day Jason Hyatt MD   10 mL at 22 1309    sodium chloride flush 0.9 % injection 5-40 mL  5-40 mL IntraVENous PRN Deysi White MD        0.9 % sodium chloride infusion   IntraVENous PRN Deysi White MD        enoxaparin Sodium (LOVENOX) injection 30 mg  30 mg SubCUTAneous BID Deysi White MD   30 mg at 12/14/22 1015    ondansetron (ZOFRAN-ODT) disintegrating tablet 4 mg  4 mg Oral Q8H PRN Deysi White MD        Or    ondansetron [facility]) injection 4 mg  4 mg IntraVENous Q6H PRN Deysi White MD   4 mg at 12/14/22 1308    polyethylene glycol (GLYCOLAX) packet 17 g  17 g Oral Daily PRN Deysi White MD        acetaminophen (TYLENOL) tablet 650 mg  650 mg Oral Q6H PRN Deysi White MD        Or    acetaminophen (TYLENOL) suppository 650 mg  650 mg Rectal Q6H PRN Deysi White MD        [Held by provider] aspirin EC tablet 81 mg  81 mg Oral Once per day on Mon Wed Fri Deysi White MD       [facility] by provider] pantoprazole (PROTONIX) tablet 20 mg  20 mg Oral QAM AC Deysi White MD   20 mg at 12/11/22 0826    [Held by provider] tamsulosin (FLOMAX) capsule 0.4 mg  0.4 mg Oral Daily Deysi White MD   0.4 mg at 12/11/22 0826    morphine (PF) injection 2 mg  2 mg IntraVENous Q3H PRN Deysi White MD   2 mg at 12/14/22 1339       Allergies:     Allergies   Allergen Reactions    Amoxicillin Itching    Penicillins Itching       Problem List:    Patient Active Problem List   Diagnosis Code    Peptic ulcer disease K27.9    Pacemaker Z95.0    Bradycardia R00.1    BPH (benign prostatic hyperplasia) N40.0    Erectile dysfunction of organic origin N52.9    FHx: prostate cancer Z80.42    SBO (small bowel obstruction) ([facility] [address] K56.609    Hyponatremia E87.1    COVID-19 U07.1       Past Medical History:        Diagnosis Date    BCC (basal cell carcinoma of skin)     BPH (benign prostatic hyperplasia) 6/15/2016    Bradycardia     Dysmetabolic syndrome     Elevated PSA     Fatty liver     Gout     Hypercholesteremia     Dr. Elisabeth Zaldivar manages    Hyperlipidemia     Hypertension     Pacemaker 13  MDL    generator replacement    Peptic ulcer disease     Stomach cancer [facility])        Past Surgical History:        Procedure Laterality Date    CERVICAL SPINE SURGERY      COLONOSCOPY      CYST REMOVAL      GASTRECTOMY      HEMICOLECTOMY      PACEMAKER INSERTION  13    generator change    UPPER GASTROINTESTINAL ENDOSCOPY         Social History:    Social History     Tobacco Use    Smoking status: Former     Types: Cigarettes     Quit date:      Years since quittin.9    Smokeless tobacco: Never   Substance Use Topics    Alcohol use: Yes                                Counseling given: Not Answered      Vital Signs (Current):   Vitals:    22 0550 22 0740 22 1015 22 1339   BP:  136/81     Pulse:  64 66    Resp: 18   18   Temp:  98.4 °F (36.9 °C)     TempSrc:       SpO2:  93%     Weight:       Height:                                                  BP Readings from Last 3 Encounters:   22 136/81   10/05/22 136/80   22 114/66       NPO Status:                                                                                 BMI:   Wt Readings from Last 3 Encounters:   22 225 lb (102.1 kg)   10/05/22 230 lb (104.3 kg)   22 226 lb (102.5 kg)     Body mass index is 29.69 kg/m².     CBC:   Lab Results   Component Value Date/Time    WBC 10.3 2022 08:29 AM    RBC 3.92 2022 08:29 AM    HGB 13.2 2022 08:29 AM    HCT 37.4 2022 08:29 AM    MCV 95.4 2022 08:29 AM    RDW 11.3 2022 08:29 AM     2022 08:29 AM       CMP:   Lab Results   Component Value Date/Time     2022 08:29 AM    K 4.1 2022 08:29 AM    CL 99 2022 08:29 AM    CO2 24 2022 08:29 AM    BUN 16 2022 08:29 AM    CREATININE 0.7 2022 08:29 AM    LABGLOM >60 2022 08:29 AM    GLUCOSE 95 2022 08:29 AM PROT 8.1 12/11/2022 02:15 AM    CALCIUM 8.8 12/14/2022 08:29 AM    BILITOT 0.5 12/11/2022 02:15 AM    ALKPHOS 67 12/11/2022 02:15 AM    AST 65 12/11/2022 02:15 AM    ALT 38 12/11/2022 02:15 AM       POC Tests: No results for input(s): POCGLU, POCNA, POCK, POCCL, POCBUN, POCHEMO, POCHCT in the last 72 hours. Coags: No results found for: PROTIME, INR, APTT    HCG (If Applicable): No results found for: PREGTESTUR, PREGSERUM, HCG, HCGQUANT     ABGs: No results found for: PHART, PO2ART, PYX9DBV, XPK3WNM, BEART, F2ZZQTEQ     Type & Screen (If Applicable):  No results found for: LABABO, LABRH    Drug/Infectious Status (If Applicable):  No results found for: HIV, HEPCAB    COVID-19 Screening (If Applicable):   Lab Results   Component Value Date/Time    COVID19 DETECTED 12/11/2022 05:19 AM           Anesthesia Evaluation  Patient summary reviewed and Nursing notes reviewed  Airway: Mallampati: II  TM distance: >3 FB   Neck ROM: full  Mouth opening: > = 3 FB   Dental:          Pulmonary:                             ROS comment: Covid +   Cardiovascular:    (+) hypertension:, pacemaker: pacemaker,       ECG reviewed                        Neuro/Psych:               GI/Hepatic/Renal:   (+) PUD, liver disease:,           Endo/Other:    (+) electrolyte abnormalities, . Abdominal:       Abdomen: tender. Vascular: Other Findings:           Anesthesia Plan      general     ASA 3       Induction: intravenous. MIPS: Postoperative opioids intended and Prophylactic antiemetics administered. Anesthetic plan and risks discussed with patient. Use of blood products discussed with patient whom. Plan discussed with CRNA.     Attending anesthesiologist reviewed and agrees with Preprocedure content                CHERYL Gardner - CRNA   12/14/2022

## 2022-12-14 NOTE — PROGRESS NOTES
[facility] General Surgery Progress Note    Chief Complaint:   Chief Complaint   Patient presents with    Abdominal Pain     Pt reports abdominal pain. Emesis     Pt reports vomiting. Pt reports diarrhea this morning. Pt states that he has not had a normal BM in a week. SUBJECTIVE:  Mr. Josh Miles is a 76 y.o. male is seen and examined at bedside. Passed flatus this morning. Attempted to have  a BM without success. He states he thinks he is feeling a bit better today. No pain. OBJECTIVE:  /81   Pulse 64   Temp 98.4 °F (36.9 °C)   Resp 18   Ht 6' 1\" (1.854 m)   Wt 225 lb (102.1 kg)   SpO2 93%   BMI 29.69 kg/m²   CONSTITUTIONAL: Alert, appropriate, no acute distress  SKIN: warm, dry with no rashes or lesions  HEENT: NCAT, Non icteric, PERR. Trachea Midline. CHEST/LUNGS: CTA bilaterally. Normal respiratory effort. CARDIOVASCULAR: RRR, No edema. ABDOMEN: continued soft abdomen without distention, no tenderness today, +BS though decreased. Umbilical incisional hernia does not contain bowel, reducible  NEUROLOGIC: CN II-XI grossly intact, no motor or sensory deficits   MUSCULOSKELETAL: No clubbing or cyanosis. PSYCHIATRIC:  Normal Mood and Affect. Alert and Oriented. Labs:  CBC:   Recent Labs     12/12/22  0256 12/13/22  0235 12/14/22  0829   WBC 8.8 8.8 10.3   HGB 13.9* 13.9* 13.2*    237 240     BMP:    Recent Labs     12/12/22  0256 12/13/22  0235 12/14/22  0829   * 133* 134*   K 4.3 4.2 4.1   CL 98 97* 99   CO2 24 26 24   BUN 14 16 16   CREATININE 0.8 0.8 0.7   GLUCOSE 101 98 95       ASSESSMENT:  Principal Problem:    SBO (small bowel obstruction) (HCC)  Active Problems:    Hyponatremia    COVID-19  Resolved Problems:    * No resolved hospital problems. *      PLAN:  Given some signs of bowel function this morning with flatus, will administer contrast to try to avoid surgery if possible. Patient and his wife note understanding.      Oliva Gibbons DO   Electronically Signed on 12/14/2022 at 10:01 AM

## 2022-12-14 NOTE — PROGRESS NOTES
Occupational Therapy  Patient preparing for surgery. Will continue to follow for evaluation as needed.  Electronically signed by Payton Crespo OT on 12/14/2022 at 3:37 PM

## 2022-12-15 LAB
ANION GAP SERPL CALCULATED.3IONS-SCNC: 13 MMOL/L (ref 7–19)
BASOPHILS ABSOLUTE: 0 K/UL (ref 0–0.2)
BASOPHILS RELATIVE PERCENT: 0.2 % (ref 0–1)
BUN BLDV-MCNC: 19 MG/DL (ref 8–23)
CALCIUM SERPL-MCNC: 8.4 MG/DL (ref 8.8–10.2)
CHLORIDE BLD-SCNC: 101 MMOL/L (ref 98–111)
CO2: 21 MMOL/L (ref 22–29)
CREAT SERPL-MCNC: 0.7 MG/DL (ref 0.5–1.2)
EOSINOPHILS ABSOLUTE: 0 K/UL (ref 0–0.6)
EOSINOPHILS RELATIVE PERCENT: 0 % (ref 0–5)
GFR SERPL CREATININE-BSD FRML MDRD: >60 ML/MIN/{1.73_M2}
GLUCOSE BLD-MCNC: 100 MG/DL (ref 70–99)
GLUCOSE BLD-MCNC: 109 MG/DL (ref 70–99)
GLUCOSE BLD-MCNC: 119 MG/DL (ref 70–99)
GLUCOSE BLD-MCNC: 141 MG/DL (ref 74–109)
HCT VFR BLD CALC: 41.8 % (ref 42–52)
HEMOGLOBIN: 14.4 G/DL (ref 14–18)
IMMATURE GRANULOCYTES #: 0.1 K/UL
LYMPHOCYTES ABSOLUTE: 0.4 K/UL (ref 1.1–4.5)
LYMPHOCYTES RELATIVE PERCENT: 2.7 % (ref 20–40)
MCH RBC QN AUTO: 33.2 PG (ref 27–31)
MCHC RBC AUTO-ENTMCNC: 34.4 G/DL (ref 33–37)
MCV RBC AUTO: 96.3 FL (ref 80–94)
MONOCYTES ABSOLUTE: 0.6 K/UL (ref 0–0.9)
MONOCYTES RELATIVE PERCENT: 4.5 % (ref 0–10)
NEUTROPHILS ABSOLUTE: 12 K/UL (ref 1.5–7.5)
NEUTROPHILS RELATIVE PERCENT: 92.1 % (ref 50–65)
PDW BLD-RTO: 11.4 % (ref 11.5–14.5)
PERFORMED ON: ABNORMAL
PLATELET # BLD: 262 K/UL (ref 130–400)
PMV BLD AUTO: 8.3 FL (ref 9.4–12.4)
POTASSIUM REFLEX MAGNESIUM: 4.2 MMOL/L (ref 3.5–5)
RBC # BLD: 4.34 M/UL (ref 4.7–6.1)
SODIUM BLD-SCNC: 135 MMOL/L (ref 136–145)
WBC # BLD: 13 K/UL (ref 4.8–10.8)

## 2022-12-15 PROCEDURE — 6370000000 HC RX 637 (ALT 250 FOR IP): Performed by: SURGERY

## 2022-12-15 PROCEDURE — 6360000002 HC RX W HCPCS: Performed by: SURGERY

## 2022-12-15 PROCEDURE — 99024 POSTOP FOLLOW-UP VISIT: CPT | Performed by: SURGERY

## 2022-12-15 PROCEDURE — 97535 SELF CARE MNGMENT TRAINING: CPT

## 2022-12-15 PROCEDURE — 36415 COLL VENOUS BLD VENIPUNCTURE: CPT

## 2022-12-15 PROCEDURE — 97530 THERAPEUTIC ACTIVITIES: CPT

## 2022-12-15 PROCEDURE — 2580000003 HC RX 258: Performed by: SURGERY

## 2022-12-15 PROCEDURE — 80048 BASIC METABOLIC PNL TOTAL CA: CPT

## 2022-12-15 PROCEDURE — 97110 THERAPEUTIC EXERCISES: CPT

## 2022-12-15 PROCEDURE — 51798 US URINE CAPACITY MEASURE: CPT

## 2022-12-15 PROCEDURE — 1210000000 HC MED SURG R&B

## 2022-12-15 PROCEDURE — 97162 PT EVAL MOD COMPLEX 30 MIN: CPT

## 2022-12-15 PROCEDURE — 97166 OT EVAL MOD COMPLEX 45 MIN: CPT

## 2022-12-15 PROCEDURE — 51701 INSERT BLADDER CATHETER: CPT

## 2022-12-15 PROCEDURE — C9113 INJ PANTOPRAZOLE SODIUM, VIA: HCPCS | Performed by: SURGERY

## 2022-12-15 PROCEDURE — 85025 COMPLETE CBC W/AUTO DIFF WBC: CPT

## 2022-12-15 PROCEDURE — 82947 ASSAY GLUCOSE BLOOD QUANT: CPT

## 2022-12-15 PROCEDURE — 2500000003 HC RX 250 WO HCPCS: Performed by: SURGERY

## 2022-12-15 RX ORDER — INSULIN LISPRO 100 [IU]/ML
0-4 INJECTION, SOLUTION INTRAVENOUS; SUBCUTANEOUS NIGHTLY
Status: DISCONTINUED | OUTPATIENT
Start: 2022-12-15 | End: 2022-12-22 | Stop reason: HOSPADM

## 2022-12-15 RX ORDER — DEXTROSE MONOHYDRATE 100 MG/ML
INJECTION, SOLUTION INTRAVENOUS CONTINUOUS PRN
Status: DISCONTINUED | OUTPATIENT
Start: 2022-12-15 | End: 2022-12-22 | Stop reason: HOSPADM

## 2022-12-15 RX ORDER — INSULIN LISPRO 100 [IU]/ML
0-4 INJECTION, SOLUTION INTRAVENOUS; SUBCUTANEOUS
Status: DISCONTINUED | OUTPATIENT
Start: 2022-12-15 | End: 2022-12-22 | Stop reason: HOSPADM

## 2022-12-15 RX ADMIN — CLINDAMYCIN PHOSPHATE 900 MG: 900 INJECTION, SOLUTION INTRAVENOUS at 01:06

## 2022-12-15 RX ADMIN — MORPHINE SULFATE 2 MG: 2 INJECTION, SOLUTION INTRAMUSCULAR; INTRAVENOUS at 16:12

## 2022-12-15 RX ADMIN — SODIUM CHLORIDE, PRESERVATIVE FREE 40 MG: 5 INJECTION INTRAVENOUS at 09:05

## 2022-12-15 RX ADMIN — KETOROLAC TROMETHAMINE 15 MG: 30 INJECTION, SOLUTION INTRAMUSCULAR; INTRAVENOUS at 09:05

## 2022-12-15 RX ADMIN — MORPHINE SULFATE 4 MG: 4 INJECTION, SOLUTION INTRAMUSCULAR; INTRAVENOUS at 20:40

## 2022-12-15 RX ADMIN — ENOXAPARIN SODIUM 30 MG: 100 INJECTION SUBCUTANEOUS at 19:35

## 2022-12-15 RX ADMIN — MORPHINE SULFATE 2 MG: 2 INJECTION, SOLUTION INTRAMUSCULAR; INTRAVENOUS at 12:09

## 2022-12-15 RX ADMIN — SODIUM CHLORIDE, PRESERVATIVE FREE 10 ML: 5 INJECTION INTRAVENOUS at 20:40

## 2022-12-15 RX ADMIN — MORPHINE SULFATE 4 MG: 4 INJECTION, SOLUTION INTRAMUSCULAR; INTRAVENOUS at 08:53

## 2022-12-15 RX ADMIN — MORPHINE SULFATE 4 MG: 4 INJECTION, SOLUTION INTRAMUSCULAR; INTRAVENOUS at 01:12

## 2022-12-15 RX ADMIN — SODIUM CHLORIDE, POTASSIUM CHLORIDE, SODIUM LACTATE AND CALCIUM CHLORIDE: 600; 310; 30; 20 INJECTION, SOLUTION INTRAVENOUS at 01:01

## 2022-12-15 RX ADMIN — FLUTICASONE PROPIONATE 1 SPRAY: 50 SPRAY, METERED NASAL at 09:09

## 2022-12-15 RX ADMIN — ENOXAPARIN SODIUM 30 MG: 100 INJECTION SUBCUTANEOUS at 09:09

## 2022-12-15 RX ADMIN — KETOROLAC TROMETHAMINE 15 MG: 30 INJECTION, SOLUTION INTRAMUSCULAR; INTRAVENOUS at 04:02

## 2022-12-15 RX ADMIN — CLINDAMYCIN PHOSPHATE 900 MG: 900 INJECTION, SOLUTION INTRAVENOUS at 16:11

## 2022-12-15 RX ADMIN — SODIUM CHLORIDE, PRESERVATIVE FREE 10 ML: 5 INJECTION INTRAVENOUS at 09:00

## 2022-12-15 RX ADMIN — KETOROLAC TROMETHAMINE 15 MG: 30 INJECTION, SOLUTION INTRAMUSCULAR; INTRAVENOUS at 16:12

## 2022-12-15 RX ADMIN — CLINDAMYCIN PHOSPHATE 900 MG: 900 INJECTION, SOLUTION INTRAVENOUS at 08:56

## 2022-12-15 RX ADMIN — KETOROLAC TROMETHAMINE 15 MG: 30 INJECTION, SOLUTION INTRAMUSCULAR; INTRAVENOUS at 22:07

## 2022-12-15 RX ADMIN — SODIUM CHLORIDE, POTASSIUM CHLORIDE, SODIUM LACTATE AND CALCIUM CHLORIDE: 600; 310; 30; 20 INJECTION, SOLUTION INTRAVENOUS at 19:35

## 2022-12-15 ASSESSMENT — PAIN SCALES - GENERAL
PAINLEVEL_OUTOF10: 6
PAINLEVEL_OUTOF10: 0
PAINLEVEL_OUTOF10: 8
PAINLEVEL_OUTOF10: 7
PAINLEVEL_OUTOF10: 6
PAINLEVEL_OUTOF10: 8
PAINLEVEL_OUTOF10: 4

## 2022-12-15 ASSESSMENT — PAIN DESCRIPTION - DESCRIPTORS
DESCRIPTORS: SHARP;SORE
DESCRIPTORS: SHARP

## 2022-12-15 ASSESSMENT — PAIN DESCRIPTION - LOCATION
LOCATION: ABDOMEN

## 2022-12-15 NOTE — PROGRESS NOTES
[facility]      Patient:  Drew Whipple  YOB: 1945  Date of Service: 12/15/2022  MRN: [id number]   Acct: [de-identified]   Primary Care Physician: CHERYL Brumfield NP  Advance Directive: Full Code  Admit Date: 2022       Hospital Day: 4  Portions of this note have been copied forward, however, changed to reflect the most current clinical status of this patient. CHIEF COMPLAINT Abdominal pain    SUBJECTIVE:  S/P ex lap due to ongoing SBO. Reports significant improvement in overall abdominal pain. Up in the chair this AM. Vitals remain stable. CUMULATIVE HOSPITAL STAY:  The patient is a 75 yo male with a PMH of pacer, BPH, HTN, HLD, GIST with gastrectomy who presented to [facility] ED on 2022 with c/o epigastric pain, N/V, and diarrhea for ~1 week that was worsening in nature. He additionally reported a URI that had been improving until the day prior to admission. Further ED work-up revealed that the pt was + for Covid 19, CT of the abdomen and pelvis showed SBO with transition point suspected to be within the hemiabdomen. Upstream bowel loops measured up to 4.5cm. Labs revealed hyponatremia with an Na-128 and Cl-91. The patient was admitted to hospital medicine due to SBO and hyponatremia with a general surgery consult. An NGT was placed to LIS and general surgery plans to treat conservatively upon admission. NGT remains at LIS and the patient remains strict NPO. He ultimately had failure of conserative treatment with NGT and bowel decompression and is  S/P ex lap with omentectomy and lysis of adhesions on 2022 per Dr. Gillis Apgar. IVF's continue with electrolytes being relatively dyddibqym-Ad-894, Cl-101. Mild leukocytosis with WBC-13.0 H&H-14.4/41.8, platelets-262. Afebrile. Review of Systems:   Review of Systems   Constitutional:  Negative for chills, diaphoresis, fatigue and fever. HENT:  Negative for congestion and ear pain.     Eyes:  Negative for visual disturbance. Respiratory:  Negative for cough, shortness of breath and wheezing. Cardiovascular:  Negative for chest pain, palpitations and leg swelling. Gastrointestinal:  Positive abdominal pain. Negative for blood in stool, constipation, diarrhea, nausea and vomiting. Endocrine: Negative for cold intolerance and heat intolerance. Genitourinary:  Negative for difficulty urinating, flank pain, frequency and urgency. Musculoskeletal:  Negative for arthralgias and myalgias. Skin:  Negative for color change and wound. Neurological:  Negative for dizziness, syncope, weakness, light-headedness, numbness and headaches. Hematological:  Does not bruise/bleed easily. Psychiatric/Behavioral:  Negative for agitation, confusion and dysphoric mood. 14 point review of systems is negative except as specifically addressed above. Objective:   VITALS:  /83   Pulse 74   Temp (!) 96.4 °F (35.8 °C) (Temporal)   Resp 18   Ht 6' 1\" (1.854 m)   Wt 225 lb (102.1 kg)   SpO2 91%   BMI 29.69 kg/m²   24HR INTAKE/OUTPUT:    Intake/Output Summary (Last 24 hours) at 12/15/2022 1216  Last data filed at 12/15/2022 1011  Gross per 24 hour   Intake 2000 ml   Output 1375 ml   Net 625 ml         Physical Exam  Vitals and nursing note reviewed. Physical Exam  Vitals and nursing note reviewed. Constitutional:       General: He is not in acute distress. Appearance: Normal appearance. He is ill-appearing. HENT:      Head: Normocephalic and atraumatic. Right Ear: External ear normal.      Left Ear: External ear normal.      Nose: Nose normal.      Comments: NGT right nare-dark gastric drainage     Mouth/Throat:      Mouth: Mucous membranes are dry. Eyes:      Extraocular Movements: Extraocular movements intact. Conjunctiva/sclera: Conjunctivae normal.      Pupils: Pupils are equal, round, and reactive to light. Cardiovascular:      Rate and Rhythm: Normal rate and regular rhythm. Pulses: Normal pulses. Heart sounds: Normal heart sounds. Pulmonary:      Effort: Pulmonary effort is normal. No respiratory distress. Breath sounds: Normal breath sounds. No wheezing, rhonchi or rales. Abdominal:      General: Bowel sounds are normal. There is no distension. Palpations: Abdomen is soft. Tenderness: There is abdominal tenderness (postoperative). Musculoskeletal:         General: No swelling, tenderness or deformity. Normal range of motion. Cervical back: Normal range of motion and neck supple. No muscular tenderness. Right lower leg: No edema. Left lower leg: No edema. Skin:     General: Skin is warm and dry. Findings: No bruising or lesion. Neurological:      Mental Status: He is alert and oriented to person, place, and time. Motor: Weakness (mild) present.               Medications:      dextrose      lactated ringers 125 mL/hr at 12/15/22 0101    sodium chloride        insulin lispro  0-4 Units SubCUTAneous TID WC    insulin lispro  0-4 Units SubCUTAneous Nightly    ketorolac  15 mg IntraVENous Q6H    clindamycin (CLEOCIN) IV  900 mg IntraVENous Q8H    pantoprazole (PROTONIX) 40 mg injection  40 mg IntraVENous Daily    fluticasone  1 spray Each Nostril Daily    sodium chloride flush  5-40 mL IntraVENous 2 times per day    enoxaparin  30 mg SubCUTAneous BID    [Held by provider] aspirin  81 mg Oral Once per day on Mon Wed Fri    [Held by provider] tamsulosin  0.4 mg Oral Daily     glucose, dextrose bolus **OR** dextrose bolus, glucagon (rDNA), dextrose, ondansetron **OR** ondansetron, morphine **OR** morphine, sodium chloride  Diet NPO Exceptions are: Sips of Water with Meds     Lab and other Data:     Recent Labs     12/13/22 0235 12/14/22  0829 12/15/22  0334   WBC 8.8 10.3 13.0*   HGB 13.9* 13.2* 14.4    240 262       Recent Labs     12/13/22 0235 12/14/22  0829 12/15/22  0334   * 134* 135*   K 4.2 4.1 4.2   CL 97* 99 101 CO2 26 24 21*   BUN 16 16 19   CREATININE 0.8 0.7 0.7   GLUCOSE 98 95 141*         RAD:   CT ABDOMEN PELVIS W IV CONTRAST Additional Contrast? None    Result Date: 12/11/2022  1. Small bowel obstruction. Transition point is suspected to be within the right hemiabdomen (series 2 image 50). Upstream bowel loops measure up to 4.5 cm. Please note this is adjacent to suture material within the right aspect of the transverse colon and stomach and may relate to adhesions. 2. No evidence of perforation or free air. Mild mesenteric ascites which may be reactive in nature. 3. No other acute findings. 4. Incidental findings as described in the body of report. Communications:12/11/22 04:52 Verify Receipt Verified receipt with ER Lamberto Mabry. on 12/11 04:52 (-06:00)Electronically signed by Mariano Fragoso MD on 12-11-22 at 0427         Assessment/Plan   Principal Problem:    SBO (small bowel obstruction) (HCC)   -POD #1 ex lap lysis of adhesion and omentectomy -per Dr. Nati Ross   -General surgery following   -Pain management   -Continue IVF's   -continue NGT to LIS   -Strict NPO   -Daily labs    -Strict I&O   -Monitor vitals    -VTE prophylaxis   -Pain and nausea management   -Supportive care    Active Problems:    Hyponatremia   -stable   -Today's Na-135   -continue IVF's   -Daily labs   -Monitor    Covid-19   -remains on RA   -Droplet precautions   -Monitor   -Relatively asymptomatic    Resolved Problems:    * No resolved hospital problems.  *      DVT Prophylaxis: Lovenox    GI prophylaxis:  Protonix    Disposition: TBCHERYL Houser, 12/15/2022 12:16 PM

## 2022-12-15 NOTE — PROGRESS NOTES
Physical Therapy  Name: Kemper Hatchet  MRN:  [id number]  Date of service:  12/15/2022       12/15/22 1525   Restrictions/Precautions   Restrictions/Precautions Isolation; Fall Risk;Surgical Protocols   Required Braces or Orthoses? Yes  (abdominal binder)   Implants present? Pacemaker   Position Activity Restriction   Other position/activity restrictions Abdominal binder   General   Family / Caregiver Present No   Referring Practitioner Gela Smith, DO   Subjective   Subjective Patient agreeable to bed ex's   General Comment   Comments 12/14/22 s/p laparotomy, omenectomy, lysis of adhesions, small bowel resection   Oxygen Therapy   O2 Device None (Room air)   Exercises   Straight Leg Raise 10   Quad Sets 10   Heelslides 10   Gluteal Sets 10   Hip Abduction 10   Knee Short Arc Quad 10   Ankle Pumps 10   Patient Goals    Patient Goals  go home   Short Term Goals   Time Frame for Short Term Goals 2 wks   Short Term Goal 1 supine to sit indep   Short Term Goal 2 sit to stand indep   Short Term Goal 3 amb. 200' indep   Conditions Requiring Skilled Therapeutic Intervention   Body Structures, Functions, Activity Limitations Requiring Skilled Therapeutic Intervention Decreased functional mobility ; Decreased ROM; Decreased endurance;Decreased cognition;Decreased strength;Decreased balance; Increased pain   Treatment Diagnosis impaired gait and mobility   Therapy Prognosis Good   Barriers to Learning none noted   Discharge Recommendations Continue to assess pending progress;24 hour supervision or assist;Patient would benefit from continued therapy after discharge   Activity Tolerance   Activity Tolerance Patient limited by fatigue;Patient limited by pain; Patient limited by endurance   Physcial Therapy Plan   General Plan 6-7 times per week   Current Treatment Recommendations Strengthening;Balance training;Functional mobility training;Transfer training;Gait training; Endurance training;Positioning;Equipment evaluation, education, & procurement;Patient/Caregiver education & training; Safety education & training; Therapeutic activities   Safety Devices   Type of Devices Call light within reach; Patient at risk for falls; Left in bed;Bed alarm in place       Electronically signed by Akilah Mcginnis PTA on 12/15/2022 at 3:51 PM

## 2022-12-15 NOTE — PROGRESS NOTES
[facility] General Surgery Progress Note    Chief Complaint:  Chief Complaint   Patient presents with    Abdominal Pain     Pt reports abdominal pain. Emesis     Pt reports vomiting. Pt reports diarrhea this morning. Pt states that he has not had a normal BM in a week. POD # 1    S: Mr. Staci Lopes is doing well. He is working with get up with PT. His pain is controlled. About 1 liter out since surgery from his NGT. No flatus. O:   /83   Pulse 74   Temp (!) 96.4 °F (35.8 °C) (Temporal)   Resp 18   Ht 6' 1\" (1.854 m)   Wt 225 lb (102.1 kg)   SpO2 91%   BMI 29.69 kg/m²   I/O reviewed and appropriate  CONSTITUTIONAL: Alert, appropriate, no acute distress  SKIN: warm, dry with no rashes or lesions  HEENT: NCAT, Non icteric, PERR. Trachea Midline. CHEST/LUNGS: CTA bilaterally. Normal respiratory effort. CARDIOVASCULAR: RRR, No edema. ABDOMEN: soft, ND, appropriately TTP, +BS. Dressing saturated. Removed. Subcutaneous emphysema of the abdominal wall. Incisions: Clean, dry, and intact with staples; no erythema or induration  NEUROLOGIC: CN II-XI grossly intact, no motor or sensory deficits   MUSCULOSKELETAL: No clubbing or cyanosis. PSYCHIATRIC:  Normal Mood and Affect. Alert and Oriented.     LABS:   CBC:   Recent Labs     12/13/22  0235 12/14/22  0829 12/15/22  0334   WBC 8.8 10.3 13.0*   RBC 4.16* 3.92* 4.34*   HGB 13.9* 13.2* 14.4   HCT 39.7* 37.4* 41.8*    240 262   LYMPHOPCT  --  9.2* 2.7*   MONOPCT  --  6.9 4.5   BASOPCT  --  0.2 0.2   MONOSABS  --  0.70 0.60   LYMPHSABS  --  0.9* 0.4*   EOSABS  --  0.10 0.00   BASOSABS  --  0.00 0.00      BMP:   Recent Labs     12/13/22  0235 12/14/22  0829 12/15/22  0334   * 134* 135*   K 4.2 4.1 4.2   CL 97* 99 101   CO2 26 24 21*   ANIONGAP 10 11 13   GLUCOSE 98 95 141*   CREATININE 0.8 0.7 0.7   LABGLOM >60 >60 >60   CALCIUM 8.7* 8.8 8.4*     LFT: No results for input(s): PROT, LABALBU, BILITOT, ALKPHOS, ALT, AST in the last 72 hours.    A: Principal Problem:    SBO (small bowel obstruction) (HCC)  Active Problems:    Hyponatremia    COVID-19  Resolved Problems:    * No resolved hospital problems. *      P:   Multimodal analgesia. Hemodynamically stable. Encourage IS usage. Continue NPO and Ngt for anticipated postoperative ileus. Awaiting retrograde bowel function. Encourage Ambulation and OOBTC. Abdominal binder when up.   Other cares per medical team.     Adrienne Hutton DO   Electronically Signed on 12/15/2022 at 12:30 PM

## 2022-12-15 NOTE — ANESTHESIA POSTPROCEDURE EVALUATION
Department of Anesthesiology  Postprocedure Note    Patient: Meg López  MRN: [id number]  YOB: 1945  Date of evaluation: 12/14/2022      Procedure Summary     Date: 12/14/22 Room / Location: [address]    Anesthesia Start: 9867 Anesthesia Stop: 4053    Procedure: LAPAROTOMY, OMENTECTOMY, LYSIS OF ADHESIONS, SMALL BOWEL RESECTION Diagnosis:       Small bowel obstruction ([address]      (Small bowel obstruction ([address] [B51.428])    Surgeons: Ema Garay DO Responsible Provider: CHERYL Solis CRNA    Anesthesia Type: general ASA Status: 3          Anesthesia Type: No value filed.     Moshe Phase I: Moshe Score: 8    Moshe Phase II:        Anesthesia Post Evaluation    Patient location during evaluation: PACU  Patient participation: complete - patient participated  Level of consciousness: sleepy but conscious  Pain score: 0  Airway patency: patent  Nausea & Vomiting: no nausea and no vomiting  Complications: no  Cardiovascular status: hemodynamically stable  Respiratory status: acceptable, spontaneous ventilation and nasal cannula  Hydration status: euvolemic

## 2022-12-15 NOTE — PROGRESS NOTES
Physical Therapy  Facility/Department: [facility] ONCOLOGY UNIT  Physical Therapy Initial Assessment    Name: Akanksha Torres  : 1945  MRN: [id number]  Date of Service: 12/15/2022    Discharge Recommendations:  Continue to assess pending progress, 24 hour supervision or assist, Patient would benefit from continued therapy after discharge          Patient Diagnosis(es): The primary encounter diagnosis was SBO (small bowel obstruction) ([facility] A diagnosis of Small bowel obstruction (HCC) was also pertinent to this visit. Past Medical History:  has a past medical history of BCC (basal cell carcinoma of skin), BPH (benign prostatic hyperplasia), Bradycardia, Dysmetabolic syndrome, Elevated PSA, Fatty liver, Gout, Hypercholesteremia, Hyperlipidemia, Hypertension, Pacemaker, Peptic ulcer disease, and St[facility] ([facility] Past Surgical History:  has a past surgical history that includes Cervical spine surgery; gastrectomy; cyst removal; hemicolectomy; Upper gastrointestinal endoscopy; Colonoscopy; and Pacemaker insertion (13). Assessment   Body Structures, Functions, Activity Limitations Requiring Skilled Therapeutic Intervention: Decreased functional mobility ; Decreased ROM; Decreased endurance;Decreased cognition;Decreased strength;Decreased balance; Increased pain  Assessment: Pt. will benefit from cont. PT to decrease impairments. Pt. a fall risk and should not attempt mobility on her own at this time. Anticipate pt will need 24. hr care initially. Pt. wore mask and amb. in hallway with RW toward 440, so no others around. Pt. enouraged to sit up in chair as much as possible. Treatment Diagnosis: impaired gait and mobility  Therapy Prognosis: Good  Decision Making: Medium Complexity  Barriers to Learning: none noted  Requires PT Follow-Up: Yes  Activity Tolerance  Activity Tolerance: Patient limited by fatigue;Patient limited by pain; Patient limited by endurance     Plan   Physcial Therapy Plan  General Plan: 6-7 times per week  Current Treatment Recommendations: Strengthening, Balance training, Functional mobility training, Transfer training, Gait training, Endurance training, Positioning, Equipment evaluation, education, & procurement, Patient/Caregiver education & training, Safety education & training, Therapeutic activities  Safety Devices  Type of Devices: Call light within reach, Gait belt, Left in chair, Patient at risk for falls, Nurse notified (pt left seated in chair and verbalized he would not get up alone, spouse present and RN aware.)     Restrictions  Restrictions/Precautions  Restrictions/Precautions: Isolation, Fall Risk, Surgical Protocols  Required Braces or Orthoses?: Yes (abdominal binder)  Implants present? : Pacemaker  Position Activity Restriction  Other position/activity restrictions: Abdominal binder     Subjective   Pain: 4-5/10-RN aware  General  Chart Reviewed: Yes  Patient assessed for rehabilitation services?: Yes  Response To Previous Treatment: Not applicable  Family / Caregiver Present:  (wife)  Referring Practitioner: Rajwinder Sullivan DO  Referral Date : 12/11/22  Diagnosis: SBO, covid  General Comment  Comments: 12/14/22 s/p laparotomy, omenectomy, lysis of adhesions, small bowel resection  Subjective  Subjective: Pt. willing to work with therapy. \"This was easier before surgery. \"         Social/Functional History  Social/Functional History  Lives With: Spouse  Type of Home: House  Home Layout: One level  Home Access: Stairs to enter with rails  Entrance Stairs - Number of Steps: 3  Bathroom Shower/Tub: Walk-in shower  Bathroom Toilet: Handicap height  ADL Assistance: Independent  Ambulation Assistance: Independent  Transfer Assistance: Independent  Active : Yes  Type of Occupation:  at [employer] 99: Impaired  Vision Exceptions: Wears glasses at all times  Hearing  Hearing: Exceptions to [facility]  Hearing Exceptions: Bilateral hearing aid;Hard of hearing/hearing concerns    Cognition   Orientation  Overall Orientation Status: Within Functional Limits  Orientation Level: Oriented X4  Cognition  Overall Cognitive Status: WNL     Objective   Heart Rate: 74  Heart Rate Source: Monitor  BP: 129/83  BP Location: Right upper arm  MAP (Calculated): 98  Resp: 18  SpO2: 91 %  O2 Device: None (Room air)     Observation/Palpation  Observation: Patient has NG tube to suction, candelario, abdominal binder        AROM RLE (degrees)  RLE AROM: WFL  AROM LLE (degrees)  LLE AROM : WFL  Strength RLE  Strength RLE: WFL  Comment: at least 4/5  Strength LLE  Strength LLE: WFL  Comment: at least 4/5           Bed mobility  Supine to Sit:  (log roll)  Bed Mobility Comments: supervision for EOB sitting  Transfers  Sit to Stand: Minimal Assistance  Stand to Sit: Minimal Assistance  Ambulation  Surface: Level tile  Device: Rolling Walker  Assistance: Contact guard assistance  Quality of Gait: steady  Gait Deviations: Slow Sandra;Decreased step length;Decreased step height  Distance: 80'  Comments: IV, candelario     Balance  Posture: Good  Sitting - Static: Good;+  Sitting - Dynamic: Good;-  Standing - Static: Fair;+  Standing - Dynamic: Fair;-           OutComes Score                                                  AM-PAC Score             Tinneti Score       Goals  Short Term Goals  Time Frame for Short Term Goals: 2 wks  Short Term Goal 1: supine to sit indep  Short Term Goal 2: sit to stand indep  Short Term Goal 3: amb. 200' indep  Patient Goals   Patient Goals : go home       Education  Patient Education  Education Given To: Patient  Education Provided: Role of Therapy;Plan of Care;Transfer Training  Education Provided Comments: use of abdominal binder, staff A when up  Education Method: Verbal  Barriers to Learning: Hearing  Education Outcome: Verbalized understanding;Continued education needed      Therapy Time   Individual Concurrent Group Co-treatment   Time In Time Out           Minutes                   Any Richards PT     Electronically signed by Any Richards PT on 12/15/2022 at 12:40 PM

## 2022-12-15 NOTE — OP NOTE
Operative Note      Patient: Joe Casas  YOB: 1945  MRN: [id number]    Date of Procedure: 12/14/2022    Pre-Op Diagnosis: Small bowel obstruction ([facility] [L48.454]    Post-Op Diagnosis: Same and Intraabdominal adhesions       Procedure(s):  LAPAROTOMY, OMENTECTOMY, LYSIS OF ADHESIONS, SMALL BOWEL RESECTION    Surgeon(s):  Saintclair Meter, DO    Assistant:   First Assistant: Aren Rae    Anesthesia: General    Estimated Blood Loss (mL): 75    Complications: None    Specimens:   ID Type Source Tests Collected by Time Destination   1 : omentum Tissue Abdomen SURGICAL PATHOLOGY Katina Nam, DO 09/66/1359 1656    A : small intestine Tissue Abdomen SURGICAL PATHOLOGY Katina Nam, DO 48/85/6874 1723        Implants:  * No implants in log *      Drains:   NG/OG/NJ/NE Tube Nasogastric 18 fr Right nostril (Active)   Surrounding Skin Clean, dry & intact 12/14/22 1015   Securement device Adhesive based hurtado 12/14/22 1015   Status Suction-low intermittent 12/14/22 1015   Placement Verified Gastric Contents 12/14/22 1015   NG/OG/NJ/NE External Measurement (cm) 65 cm 12/14/22 1015   Drainage Appearance Brown 12/14/22 1531   Output (mL) 900 ml 12/14/22 1531       Urinary Catheter 12/14/22 2 Way (Active)       Findings: intra-abdominal adhesions causing small bowel obstruction at distal small bowel; numeous clips within the omentum also causing a kink in the lumen of the small bowel. Detailed Description of Procedure:  Patient was taken to the main operating room, placed on the operating table supine. After IV Antibiotics were administered, the patient was placed under   general endotracheal anesthesia. A candelario catheter was placed. The abdomen was prepped and draped in the usual sterile fashion. A timeout was performed   identifying the correct patient, equipment, and antibiotics given. A generous midline incision was made and carried to the level of the fascia.  The fascia was divided and the peritoneum divided with scissors. The abdomen was then opened the length of the incision taking care to protect the bowel below. The abdomen was inspected and there was a wad of omentum that was adherent to the bowel. There were numerous clips within this omentum. This portion of the omentum was carefully resected and sent to pathology. A ring retractor system was placed on the field. The bowel was examined from proximal to distal. There was a loop of bowel that was denesley adherent overlying the right kidney. This was released from the posterior adhesions through sharp and careful cautery dissection. Once freed, the bowel was found to have some serosal tears and was dark in color as well as quite distended. The decision was made to remove this 6-8 inch segment of bowel. A point of transection was marked at the proximal small bowel. An opening in the mesentery adjacent to the small bowel was made and a TALI 75 with blue load was passed through this and closed. The small bowel was divided. In a similar fashion an opening was made in the mesentery of the distal small bowel and again placed a TALI 75 with a blue load and divided the bowel. The mesentery of the isolated segment was then divided with the large-jaw Enseal device. Once released the specimen was marked placed in formalin and sent to pathology for exam.    Bowel continuity was then restored using a side-to-side anastomosis created with the TALI stapler. This was accomplished by opening the antimesenteric tip of the distal  and proximal small bowel staple lines, that laid nicely without tension next to each other. One fork of the  stapler was placed into each opening and brought the bowel together with the antimesenteric borders approximated. The stapler was closed and fired. The stapler was then opened and removed. Hemostasis checked and assured.   The opening through which the stapler had been placed was then closed with TA 60 blue load with good result. The mesenteric defect was left open as it was fairly wide. The corners were over-sewn with 000 silk sutures. The bowel was then returned to the abdominal cavity. The abdomen was irrigated with warm sterile saline and removed with table suction. The ring retractor system and laparotomy sponges were removed. The small bowel was further examined and there were numerous inter-loop adhesions, especially at the are of his previous colo-enteric anastomosis. These were freed sharply and carefully to protect the bowel. There were no enterotomies made and no episodes of bleeding. The bowel was again examined from distal to proximal, to the Ligament of Trietz. No further areas of obstruction were encountered. The abdomen was again irrigated with warm saline and removed with suction. The abdomen was closed in a running fashion with number one loop PDS suture. The subcutaneous tissues were irrigated and the skin closed with staples. Sponge, needle, instrument count correct on 2 occasions. Estimated intraoperative blood loss 75 mL. Mr. Latesha Wong tolerated his surgery well and he was taken to PACU in satisfactory condition.           ________________________________  DO Janis        Electronically signed by DO Janis on 32/74/7935 at 6:13 PM

## 2022-12-15 NOTE — PROGRESS NOTES
Occupational Therapy Initial Assessment  Date: 12/15/2022   Patient Name: Kate Gan  MRN: [id number]     : 1945    Date of Service: 12/15/2022    Discharge Recommendations:  Patient would benefit from continued therapy after discharge  OT Equipment Recommendations  Equipment Needed: Yes  Other: patient and spouse considering RW for home    Assessment   Assessment: Evaluation completed and tx initiated. The patient would benefit from further therapy to upgrade safety, functional independence. Treatment Diagnosis: SBO s/p laparotomy, omenectomy, lysis of adhesions, small bowel resection  REQUIRES OT FOLLOW-UP: Yes  Activity Tolerance  Activity Tolerance: Patient Tolerated treatment well           Patient Diagnosis(es): The primary encounter diagnosis was SBO (small bowel obstruction) ([facility] A diagnosis of Small bowel obstruction (HCC) was also pertinent to this visit. has a past medical history of BCC (basal cell carcinoma of skin), BPH (benign prostatic hyperplasia), Bradycardia, Dysmetabolic syndrome, Elevated PSA, Fatty liver, Gout, Hypercholesteremia, Hyperlipidemia, Hypertension, Pacemaker, Peptic ulcer disease, and Stomach cancer ([facility] has a past surgical history that includes Cervical spine surgery; gastrectomy; cyst removal; hemicolectomy; Upper gastrointestinal endoscopy; Colonoscopy; and Pacemaker insertion (13).     Treatment Diagnosis: SBO s/p laparotomy, omenectomy, lysis of adhesions, small bowel resection      Restrictions  Restrictions/Precautions  Restrictions/Precautions: Isolation, Fall Risk, Surgical Protocols  Required Braces or Orthoses?: Yes  Position Activity Restriction  Other position/activity restrictions: Abdominal binder    Subjective   General  Chart Reviewed: Yes  Patient assessed for rehabilitation services?: Yes  Family / Caregiver Present: Yes (spouse)  Pain Assessment  Pain Level: 4  Patient's Stated Pain Goal: 0 - No pain  Pain Location: Abdomen  Pain Descriptors: Alejandro Scott  Pre Treatment Pain Screening  Pain at present: 2 (surgical wound, abdoment)  Scale Used: Numeric Score  Intervention List: Patient able to continue with treatment  Comments / Details: 4/10 with activity. used pain minimizing strategies, positioned for comfort, notified nurse 1150 that patient wants education on pain meds/timing, patient satisfied    Social/Functional History  Social/Functional History  Lives With: Spouse  Type of Home: House  Home Layout: One level  Home Access: Stairs to enter with rails  Entrance Stairs - Number of Steps: 3  Bathroom Shower/Tub: Walk-in shower  Bathroom Toilet: Handicap height  ADL Assistance: Independent  Ambulation Assistance: Independent (no device)  Transfer Assistance: Independent  Active : Yes       Objective   Vision Exceptions: Wears glasses at all times  Hearing Exceptions: Bilateral hearing aid       Observation/Palpation  Observation: Patient has NG tube to suction, candelario  Toilet Transfers  Toilet - Technique: Ambulating  Toilet Transfer: Minimal assistance  ADL  Feeding: NPO  Grooming: Independent;Setup  UE Bathing: Minimal assistance  LE Bathing: Minimal assistance  UE Dressing: Independent;Setup  LE Dressing: Minimal assistance  Toileting: Minimal assistance        Bed mobility  Supine to Sit: Contact guard assistance  Bed Mobility Comments: supervision for EOB sitting  Transfers  Stand Step Transfers: Minimal assistance     Cognition  Overall Cognitive Status: WNL                 LUE AROM (degrees)  LUE AROM : WNL  RUE AROM (degrees)  RUE AROM : WNL                    Plan   Occupational Therapy Plan  Times Per Week: 3-5    Goals  Short Term Goals  Short Term Goal 1: The patient will be supervision to independent with routine personal care  Short Term Goal 2: Independent with therapeutic activity recommendations, AE/DME options, and movement strategies recommended per abdominal surgery.   Short Term Goal 3: Supervision for light ambulatory ADL     Tx initiated:  Training in therapeutic activity/positioning/movement strategies. Performed light ambulatory ADL with CGA/min A.   (30 mins)          Amari Cervantes OT/CRISTIAN  Electronically signed by Amari Cervantes OT/CRISTIAN on 12/15/2022 at 12:18 PM.

## 2022-12-16 PROBLEM — R73.9 HYPERGLYCEMIA: Status: ACTIVE | Noted: 2022-12-16

## 2022-12-16 LAB
ANION GAP SERPL CALCULATED.3IONS-SCNC: 10 MMOL/L (ref 7–19)
BASOPHILS ABSOLUTE: 0 K/UL (ref 0–0.2)
BASOPHILS RELATIVE PERCENT: 0.1 % (ref 0–1)
BUN BLDV-MCNC: 22 MG/DL (ref 8–23)
CALCIUM SERPL-MCNC: 8.7 MG/DL (ref 8.8–10.2)
CHLORIDE BLD-SCNC: 101 MMOL/L (ref 98–111)
CO2: 26 MMOL/L (ref 22–29)
CREAT SERPL-MCNC: 0.9 MG/DL (ref 0.5–1.2)
EOSINOPHILS ABSOLUTE: 0 K/UL (ref 0–0.6)
EOSINOPHILS RELATIVE PERCENT: 0.4 % (ref 0–5)
GFR SERPL CREATININE-BSD FRML MDRD: >60 ML/MIN/{1.73_M2}
GLUCOSE BLD-MCNC: 114 MG/DL (ref 74–109)
GLUCOSE BLD-MCNC: 82 MG/DL (ref 70–99)
GLUCOSE BLD-MCNC: 88 MG/DL (ref 70–99)
GLUCOSE BLD-MCNC: 96 MG/DL (ref 70–99)
GLUCOSE BLD-MCNC: 96 MG/DL (ref 70–99)
HCT VFR BLD CALC: 37.3 % (ref 42–52)
HEMOGLOBIN: 12.5 G/DL (ref 14–18)
IMMATURE GRANULOCYTES #: 0.1 K/UL
LYMPHOCYTES ABSOLUTE: 0.7 K/UL (ref 1.1–4.5)
LYMPHOCYTES RELATIVE PERCENT: 6.3 % (ref 20–40)
MCH RBC QN AUTO: 33 PG (ref 27–31)
MCHC RBC AUTO-ENTMCNC: 33.5 G/DL (ref 33–37)
MCV RBC AUTO: 98.4 FL (ref 80–94)
MONOCYTES ABSOLUTE: 0.6 K/UL (ref 0–0.9)
MONOCYTES RELATIVE PERCENT: 5.4 % (ref 0–10)
NEUTROPHILS ABSOLUTE: 9 K/UL (ref 1.5–7.5)
NEUTROPHILS RELATIVE PERCENT: 87.1 % (ref 50–65)
PDW BLD-RTO: 11.9 % (ref 11.5–14.5)
PERFORMED ON: NORMAL
PLATELET # BLD: 225 K/UL (ref 130–400)
PMV BLD AUTO: 8.1 FL (ref 9.4–12.4)
POTASSIUM REFLEX MAGNESIUM: 3.9 MMOL/L (ref 3.5–5)
RBC # BLD: 3.79 M/UL (ref 4.7–6.1)
SODIUM BLD-SCNC: 137 MMOL/L (ref 136–145)
WBC # BLD: 10.3 K/UL (ref 4.8–10.8)

## 2022-12-16 PROCEDURE — 97110 THERAPEUTIC EXERCISES: CPT

## 2022-12-16 PROCEDURE — 99024 POSTOP FOLLOW-UP VISIT: CPT | Performed by: SURGERY

## 2022-12-16 PROCEDURE — 94760 N-INVAS EAR/PLS OXIMETRY 1: CPT

## 2022-12-16 PROCEDURE — 6360000002 HC RX W HCPCS: Performed by: SURGERY

## 2022-12-16 PROCEDURE — 85025 COMPLETE CBC W/AUTO DIFF WBC: CPT

## 2022-12-16 PROCEDURE — 80048 BASIC METABOLIC PNL TOTAL CA: CPT

## 2022-12-16 PROCEDURE — 36415 COLL VENOUS BLD VENIPUNCTURE: CPT

## 2022-12-16 PROCEDURE — 1210000000 HC MED SURG R&B

## 2022-12-16 PROCEDURE — 2580000003 HC RX 258: Performed by: SURGERY

## 2022-12-16 PROCEDURE — 2580000003 HC RX 258: Performed by: NURSE PRACTITIONER

## 2022-12-16 PROCEDURE — C9113 INJ PANTOPRAZOLE SODIUM, VIA: HCPCS | Performed by: SURGERY

## 2022-12-16 PROCEDURE — 82947 ASSAY GLUCOSE BLOOD QUANT: CPT

## 2022-12-16 RX ADMIN — MORPHINE SULFATE 4 MG: 4 INJECTION, SOLUTION INTRAMUSCULAR; INTRAVENOUS at 15:39

## 2022-12-16 RX ADMIN — MORPHINE SULFATE 4 MG: 4 INJECTION, SOLUTION INTRAMUSCULAR; INTRAVENOUS at 20:46

## 2022-12-16 RX ADMIN — FLUTICASONE PROPIONATE 1 SPRAY: 50 SPRAY, METERED NASAL at 08:30

## 2022-12-16 RX ADMIN — ENOXAPARIN SODIUM 30 MG: 100 INJECTION SUBCUTANEOUS at 08:30

## 2022-12-16 RX ADMIN — SODIUM CHLORIDE, PRESERVATIVE FREE 40 MG: 5 INJECTION INTRAVENOUS at 08:30

## 2022-12-16 RX ADMIN — KETOROLAC TROMETHAMINE 15 MG: 30 INJECTION, SOLUTION INTRAMUSCULAR; INTRAVENOUS at 20:42

## 2022-12-16 RX ADMIN — ENOXAPARIN SODIUM 30 MG: 100 INJECTION SUBCUTANEOUS at 20:42

## 2022-12-16 RX ADMIN — MORPHINE SULFATE 4 MG: 4 INJECTION, SOLUTION INTRAMUSCULAR; INTRAVENOUS at 01:18

## 2022-12-16 RX ADMIN — SODIUM CHLORIDE, PRESERVATIVE FREE 10 ML: 5 INJECTION INTRAVENOUS at 20:41

## 2022-12-16 RX ADMIN — KETOROLAC TROMETHAMINE 15 MG: 30 INJECTION, SOLUTION INTRAMUSCULAR; INTRAVENOUS at 15:06

## 2022-12-16 RX ADMIN — MORPHINE SULFATE 4 MG: 4 INJECTION, SOLUTION INTRAMUSCULAR; INTRAVENOUS at 18:12

## 2022-12-16 RX ADMIN — SODIUM CHLORIDE, PRESERVATIVE FREE 10 ML: 5 INJECTION INTRAVENOUS at 08:30

## 2022-12-16 RX ADMIN — MORPHINE SULFATE 2 MG: 2 INJECTION, SOLUTION INTRAMUSCULAR; INTRAVENOUS at 08:19

## 2022-12-16 RX ADMIN — SODIUM CHLORIDE, POTASSIUM CHLORIDE, SODIUM LACTATE AND CALCIUM CHLORIDE: 600; 310; 30; 20 INJECTION, SOLUTION INTRAVENOUS at 15:22

## 2022-12-16 RX ADMIN — KETOROLAC TROMETHAMINE 15 MG: 30 INJECTION, SOLUTION INTRAMUSCULAR; INTRAVENOUS at 09:56

## 2022-12-16 RX ADMIN — KETOROLAC TROMETHAMINE 15 MG: 30 INJECTION, SOLUTION INTRAMUSCULAR; INTRAVENOUS at 04:52

## 2022-12-16 RX ADMIN — MORPHINE SULFATE 4 MG: 4 INJECTION, SOLUTION INTRAMUSCULAR; INTRAVENOUS at 12:51

## 2022-12-16 ASSESSMENT — PAIN DESCRIPTION - LOCATION
LOCATION: ABDOMEN

## 2022-12-16 ASSESSMENT — PAIN SCALES - GENERAL
PAINLEVEL_OUTOF10: 7
PAINLEVEL_OUTOF10: 8
PAINLEVEL_OUTOF10: 2
PAINLEVEL_OUTOF10: 6
PAINLEVEL_OUTOF10: 7
PAINLEVEL_OUTOF10: 0
PAINLEVEL_OUTOF10: 8
PAINLEVEL_OUTOF10: 7
PAINLEVEL_OUTOF10: 7
PAINLEVEL_OUTOF10: 8
PAINLEVEL_OUTOF10: 2
PAINLEVEL_OUTOF10: 0

## 2022-12-16 ASSESSMENT — PAIN DESCRIPTION - ORIENTATION
ORIENTATION: RIGHT;LEFT
ORIENTATION: MID;UPPER
ORIENTATION: UPPER;MID

## 2022-12-16 ASSESSMENT — PAIN DESCRIPTION - PAIN TYPE: TYPE: ACUTE PAIN

## 2022-12-16 ASSESSMENT — PAIN DESCRIPTION - DESCRIPTORS
DESCRIPTORS: SHARP
DESCRIPTORS: ACHING;SHARP
DESCRIPTORS: SHARP
DESCRIPTORS: ACHING
DESCRIPTORS: ACHING
DESCRIPTORS: SHARP
DESCRIPTORS: ACHING

## 2022-12-16 ASSESSMENT — PAIN SCALES - WONG BAKER
WONGBAKER_NUMERICALRESPONSE: 2
WONGBAKER_NUMERICALRESPONSE: 0

## 2022-12-16 ASSESSMENT — PAIN - FUNCTIONAL ASSESSMENT: PAIN_FUNCTIONAL_ASSESSMENT: ACTIVITIES ARE NOT PREVENTED

## 2022-12-16 ASSESSMENT — PAIN DESCRIPTION - FREQUENCY: FREQUENCY: CONTINUOUS

## 2022-12-16 NOTE — CARE COORDINATION
Met with patient and patient's spouse to discuss discharge needs. Explained that therapy is recommending patient use a walker. Patient is agreeable to using a walker and does not have a preferred supplier. Patient is also agreeable to Home Health at discharge. Walker ordered from [employer]. Olivia Thompson is to be delivered to patient's room.   Ranjit   P [phone]  F [phone]  Electronically signed by Isabella Clarke RN on 12/16/2022 at 11:50 AM

## 2022-12-16 NOTE — PROGRESS NOTES
[facility]      Patient:  Mellissa Vyas  YOB: 1945  Date of Service: 12/16/2022  MRN: [id number]   Acct: [de-identified]   Primary Care Physician: CHERYL Villa - NP  Advance Directive: Full Code  Admit Date: 12/11/2022       Hospital Day: 5  Portions of this note have been copied forward, however, changed to reflect the most current clinical status of this patient. CHIEF COMPLAINT Abdominal pain    SUBJECTIVE:  S/P ex lap due to ongoing SBO on 12/14/2022. He has had worsening pain overnight, but he feels it is under but feels it's under better control now-does have associated hypertension-monitoring. Denies passage of flatus. CUMULATIVE HOSPITAL STAY:  The patient is a 77 yo male with a PMH of pacer, BPH, HTN, HLD, GIST with gastrectomy who presented to [facility] ED on 12/11/2022 with c/o epigastric pain, N/V, and diarrhea for ~1 week that was worsening in nature. He additionally reported a URI that had been improving until the day prior to admission. Further ED work-up revealed that the pt was + for Covid 19, CT of the abdomen and pelvis showed SBO with transition point suspected to be within the hemiabdomen. Upstream bowel loops measured up to 4.5cm. Labs revealed hyponatremia with an Na-128 and Cl-91. The patient was admitted to hospital medicine due to SBO and hyponatremia with a general surgery consult. An NGT was placed to [facility] and general surgery plans to treat conservatively upon admission. NGT remains at LIS and the patient remains strict NPO. He ultimately had failure of conserative treatment with NGT and bowel decompression and is  S/P ex lap with omentectomy and lysis of adhesions on 12/14/2022 per Dr. Nathalie Amaya. He has had some issues with pain overnight with associated hypertension, but pain and hypertension improving. IVF's continue with electrolytes being relatively [id number], Cl-101.   Mild leukocytosis has resolved with WBC-10.3 H&H-12.5/37.3-mild anemia felt to be r/t surgical intervention on 12/14/2022, platelets-225. Remains afebrile. Very little output from NGT to LIS. Review of Systems:   Review of Systems   Constitutional:  Negative for chills, diaphoresis, fatigue and fever. HENT:  Negative for congestion and ear pain. Eyes:  Negative for visual disturbance. Respiratory:  Negative for cough, shortness of breath and wheezing. Cardiovascular:  Negative for chest pain, palpitations and leg swelling. Gastrointestinal:  Positive abdominal pain. Negative for blood in stool, constipation, diarrhea, nausea and vomiting. Endocrine: Negative for cold intolerance and heat intolerance. Genitourinary:  Negative for difficulty urinating, flank pain, frequency and urgency. Musculoskeletal:  Negative for arthralgias and myalgias. Skin:  Negative for color change and wound. Neurological:  Negative for dizziness, syncope, weakness, light-headedness, numbness and headaches. Hematological:  Does not bruise/bleed easily. Psychiatric/Behavioral:  Negative for agitation, confusion and dysphoric mood. 14 point review of systems is negative except as specifically addressed above. Objective:   VITALS:  BP (!) 153/98   Pulse 80   Temp 97.2 °F (36.2 °C) (Temporal)   Resp 18   Ht 6' 1\" (1.854 m)   Wt 225 lb (102.1 kg)   SpO2 92%   BMI 29.69 kg/m²   24HR INTAKE/OUTPUT:    Intake/Output Summary (Last 24 hours) at 12/16/2022 1142  Last data filed at 12/16/2022 0553  Gross per 24 hour   Intake 4843 ml   Output 1150 ml   Net 3693 ml         Physical Exam  Vitals and nursing note reviewed. Physical Exam  Vitals and nursing note reviewed. Constitutional:       General: He is not in acute distress. Appearance: Normal appearance. He is ill-appearing. HENT:      Head: Normocephalic and atraumatic.       Right Ear: External ear normal.      Left Ear: External ear normal.      Nose: Nose normal.      Comments: NGT right nare-brown gastric drainage Mouth/Throat:      Mouth: Mucous membranes are dry. Eyes:      Extraocular Movements: Extraocular movements intact. Conjunctiva/sclera: Conjunctivae normal.      Pupils: Pupils are equal, round, and reactive to light. Cardiovascular:      Rate and Rhythm: Normal rate and regular rhythm. Pulses: Normal pulses. Heart sounds: Normal heart sounds. Pulmonary:      Effort: Pulmonary effort is normal. No respiratory distress. Breath sounds: Normal breath sounds. No wheezing, rhonchi or rales. Abdominal:      General: Bowel sounds are decreased. There is no distension. Palpations: Abdomen is soft. Tenderness: There is abdominal tenderness (postoperative). Musculoskeletal:         General: No swelling, tenderness or deformity. Normal range of motion. Cervical back: Normal range of motion and neck supple. No muscular tenderness. Right lower leg: No edema. Left lower leg: No edema. Skin:     General: Skin is warm and dry. Findings: No bruising or lesion. Neurological:      Mental Status: He is alert and oriented to person, place, and time. Motor: Weakness (mild) present.               Medications:      dextrose      lactated ringers 75 mL/hr at 12/16/22 9327    sodium chloride        insulin lispro  0-4 Units SubCUTAneous TID WC    insulin lispro  0-4 Units SubCUTAneous Nightly    ketorolac  15 mg IntraVENous Q6H    pantoprazole (PROTONIX) 40 mg injection  40 mg IntraVENous Daily    fluticasone  1 spray Each Nostril Daily    sodium chloride flush  5-40 mL IntraVENous 2 times per day    enoxaparin  30 mg SubCUTAneous BID    [Held by provider] aspirin  81 mg Oral Once per day on Mon Wed Fri    [Held by provider] tamsulosin  0.4 mg Oral Daily     glucose, dextrose bolus **OR** dextrose bolus, glucagon (rDNA), dextrose, ondansetron **OR** ondansetron, morphine **OR** morphine, sodium chloride  Diet NPO Exceptions are: Sips of Water with Meds     Lab and other Data:     Recent Labs     12/14/22  0829 12/15/22  0334 12/16/22  0345   WBC 10.3 13.0* 10.3   HGB 13.2* 14.4 12.5*    262 225       Recent Labs     12/14/22  0829 12/15/22  0334 12/16/22  0345   * 135* 137   K 4.1 4.2 3.9   CL 99 101 101   CO2 24 21* 26   BUN 16 19 22   CREATININE 0.7 0.7 0.9   GLUCOSE 95 141* 114*         RAD:   CT ABDOMEN PELVIS W IV CONTRAST Additional Contrast? None    Result Date: 12/11/2022  1. Small bowel obstruction. Transition point is suspected to be within the right hemiabdomen (series 2 image 50). Upstream bowel loops measure up to 4.5 cm. Please note this is adjacent to suture material within the right aspect of the transverse colon and stomach and may relate to adhesions. 2. No evidence of perforation or free air. Mild mesenteric ascites which may be reactive in nature. 3. No other acute findings. 4. Incidental findings as described in the body of report. Communications:12/11/22 04:52 Verify Receipt Verified receipt with ER Lucia Sparks. on 12/11 04:52 (-06:00)Electronically signed by Chani Phan MD on 12-11-22 at 0421         Assessment/Plan   Principal Problem:    SBO (small bowel obstruction) (HCC)   -POD #2 ex lap lysis of adhesion and omentectomy -per Dr. Dewey Campuzano   -General surgery following   -Pain management   -Continue IVF's   -continue NGT to LIS   -Strict NPO   -Daily labs    -Strict I&O   -Monitor vitals    -VTE prophylaxis   -Pain and nausea management   -Supportive care    Active Problems:    Hyponatremia   -Resolved   -Today's Na-137   -continue IVF's while NPO   -Daily labs   -Monitor    Covid-19   -remains on RA   -Droplet precautions   -Monitor   -Relatively asymptomatic    Hyperglycemia   -Continue low dose correctional protocol   -Hypoglycemic protocol   -Accuchecks    Hypertension   -Will monitor at this time-felt to be r/t pain      Resolved Problems:    * No resolved hospital problems.  *      DVT Prophylaxis: Lovenox    GI prophylaxis: Protonix    Disposition: TBJAZZY White, CHERYL, 12/16/2022 11:42 AM

## 2022-12-16 NOTE — PROGRESS NOTES
Physical Therapy    Name: Kemper Hatchet  MRN:  [id number]  Date of service:  12/16/2022 12/16/22 1139   Restrictions/Precautions   Restrictions/Precautions Isolation; Fall Risk;Surgical Protocols   Required Braces or Orthoses? Yes   Implants present? Pacemaker   Position Activity Restriction   Other position/activity restrictions Abdominal binder   Subjective   Subjective Pt agreed to therapy. Pain Assessment   Pain Assessment Mendoza-Baker FACES   Mendoza-Baker Pain Rating 2   Pain Location Abdomen   Pain Orientation Mid;Upper   Pain Descriptors Aching   Functional Pain Assessment Activities are not prevented   Pain Type Acute pain   Pain Frequency Continuous   Non-Pharmaceutical Pain Intervention(s) Ambulation/Increased Activity;Repositioned; Rest   Response to Pain Intervention Patient satisfied   Bed Mobility   Sit to Supine Stand by assistance   Scooting Stand by assistance  (in sitting)   Transfers   Sit to Stand Contact guard assistance   Stand to Sit Contact guard assistance   Ambulation   Surface Level tile   Device Rolling Walker   Assistance Contact guard assistance   Quality of Gait steady   Gait Deviations Slow Sandra;Decreased step length;Decreased step height   Distance 4'   Comments pt wanting to get back to bed   Exercises   Hip Flexion 10   Knee Long Arc Quad 10   Ankle Pumps 10   Comments BLE sitting exercise   Other Activities   Comment IV leaking, notified nurse and assisted pt with gown change   Patient Goals    Patient Goals  go home   Short Term Goals   Time Frame for Short Term Goals 2 wks   Short Term Goal 1 supine to sit indep   Short Term Goal 2 sit to stand indep   Short Term Goal 3 amb. 200' indep   Conditions Requiring Skilled Therapeutic Intervention   Body Structures, Functions, Activity Limitations Requiring Skilled Therapeutic Intervention Decreased functional mobility ; Decreased ROM; Decreased endurance;Decreased cognition;Decreased strength;Decreased balance; Increased pain Assessment Pt worked on transfer and exercises, connected to NG suction. Did well with exercise and no c/o pain. Pt back to bed with all needs in reach. Activity Tolerance   Activity Tolerance Patient tolerated treatment well   PT Plan of Care   Friday X   Safety Devices   Type of Devices Bed alarm in place;Call light within reach; Left in bed         Electronically signed by Ford Gregorio PTA on 12/16/2022 at 11:49 AM

## 2022-12-16 NOTE — PROGRESS NOTES
[facility] General Surgery Progress Note    Chief Complaint:  Chief Complaint   Patient presents with    Abdominal Pain     Pt reports abdominal pain. Emesis     Pt reports vomiting. Pt reports diarrhea this morning. Pt states that he has not had a normal BM in a week. POD # 2    S: Mr. Angela Monroy is doing well. He is up to the chair and has ambulated in the halls today. He passed a small amount of flatus yesterday but nothing really today. He states he is feeling better though. O:   BP (!) 153/98   Pulse 80   Temp 97.2 °F (36.2 °C) (Temporal)   Resp 18   Ht 6' 1\" (1.854 m)   Wt 225 lb (102.1 kg)   SpO2 92%   BMI 29.69 kg/m²   I/O reviewed and appropriate  CONSTITUTIONAL: Alert, appropriate, no acute distress  SKIN: warm, dry with no rashes or lesions  HEENT: NCAT, Non icteric, PERR. Trachea Midline. CHEST/LUNGS: CTA bilaterally. Normal respiratory effort. CARDIOVASCULAR: RRR, No edema. ABDOMEN: soft, ND, appropriately TTP, +BS. Abd binder in place. Incisions: Clean, dry, and intact with staples; no erythema or induration  NEUROLOGIC: CN II-XI grossly intact, no motor or sensory deficits   MUSCULOSKELETAL: No clubbing or cyanosis. PSYCHIATRIC:  Normal Mood and Affect. Alert and Oriented.     LABS:   CBC:   Recent Labs     12/14/22  0829 12/15/22  0334 12/16/22  0345   WBC 10.3 13.0* 10.3   RBC 3.92* 4.34* 3.79*   HGB 13.2* 14.4 12.5*   HCT 37.4* 41.8* 37.3*    262 225   LYMPHOPCT 9.2* 2.7* 6.3*   MONOPCT 6.9 4.5 5.4   BASOPCT 0.2 0.2 0.1   MONOSABS 0.70 0.60 0.60   LYMPHSABS 0.9* 0.4* 0.7*   EOSABS 0.10 0.00 0.00   BASOSABS 0.00 0.00 0.00      BMP:   Recent Labs     12/14/22  0829 12/15/22  0334 12/16/22  0345   * 135* 137   K 4.1 4.2 3.9   CL 99 101 101   CO2 24 21* 26   ANIONGAP 11 13 10   GLUCOSE 95 141* 114*   CREATININE 0.7 0.7 0.9   LABGLOM >60 >60 >60   CALCIUM 8.8 8.4* 8.7*     LFT: No results for input(s): PROT, LABALBU, BILITOT, ALKPHOS, ALT, AST in the last 72 hours.    A: Principal Problem:    SBO (small bowel obstruction) (HCC)  Active Problems:    Hyponatremia    COVID-19    Hyperglycemia  Resolved Problems:    * No resolved hospital problems. *      P:   Multimodal analgesia. Hemodynamically stable. Encourage IS usage. Continue NPO and Ngt for anticipated postoperative ileus. Awaiting retrograde bowel function. May have sips and ice chips with NGT in place. Encourage Ambulation and OOBTC. Abdominal binder when up. Other cares per medical team.     Dr. Mellisa Witt will cover over the weekend and d/c ngt and advance diet when ready.     Divya Dubose DO   Electronically Signed on 12/16/2022 at 11:54 AM

## 2022-12-17 LAB
ANION GAP SERPL CALCULATED.3IONS-SCNC: 12 MMOL/L (ref 7–19)
BASOPHILS ABSOLUTE: 0 K/UL (ref 0–0.2)
BASOPHILS RELATIVE PERCENT: 0.2 % (ref 0–1)
BUN BLDV-MCNC: 17 MG/DL (ref 8–23)
CALCIUM SERPL-MCNC: 8 MG/DL (ref 8.8–10.2)
CHLORIDE BLD-SCNC: 102 MMOL/L (ref 98–111)
CO2: 23 MMOL/L (ref 22–29)
CREAT SERPL-MCNC: 0.6 MG/DL (ref 0.5–1.2)
EOSINOPHILS ABSOLUTE: 0.2 K/UL (ref 0–0.6)
EOSINOPHILS RELATIVE PERCENT: 1.2 % (ref 0–5)
GFR SERPL CREATININE-BSD FRML MDRD: >60 ML/MIN/{1.73_M2}
GLUCOSE BLD-MCNC: 84 MG/DL (ref 70–99)
GLUCOSE BLD-MCNC: 86 MG/DL (ref 70–99)
GLUCOSE BLD-MCNC: 86 MG/DL (ref 70–99)
GLUCOSE BLD-MCNC: 89 MG/DL (ref 70–99)
GLUCOSE BLD-MCNC: 93 MG/DL (ref 74–109)
HCT VFR BLD CALC: 35.1 % (ref 42–52)
HEMOGLOBIN: 11.9 G/DL (ref 14–18)
IMMATURE GRANULOCYTES #: 0.1 K/UL
LYMPHOCYTES ABSOLUTE: 0.5 K/UL (ref 1.1–4.5)
LYMPHOCYTES RELATIVE PERCENT: 3.7 % (ref 20–40)
MCH RBC QN AUTO: 33.4 PG (ref 27–31)
MCHC RBC AUTO-ENTMCNC: 33.9 G/DL (ref 33–37)
MCV RBC AUTO: 98.6 FL (ref 80–94)
MONOCYTES ABSOLUTE: 0.5 K/UL (ref 0–0.9)
MONOCYTES RELATIVE PERCENT: 4.1 % (ref 0–10)
NEUTROPHILS ABSOLUTE: 11.8 K/UL (ref 1.5–7.5)
NEUTROPHILS RELATIVE PERCENT: 90 % (ref 50–65)
PDW BLD-RTO: 11.9 % (ref 11.5–14.5)
PERFORMED ON: NORMAL
PLATELET # BLD: 243 K/UL (ref 130–400)
PMV BLD AUTO: 8.7 FL (ref 9.4–12.4)
POTASSIUM REFLEX MAGNESIUM: 3.8 MMOL/L (ref 3.5–5)
RBC # BLD: 3.56 M/UL (ref 4.7–6.1)
SODIUM BLD-SCNC: 137 MMOL/L (ref 136–145)
WBC # BLD: 13.2 K/UL (ref 4.8–10.8)

## 2022-12-17 PROCEDURE — 6360000002 HC RX W HCPCS: Performed by: SURGERY

## 2022-12-17 PROCEDURE — 94760 N-INVAS EAR/PLS OXIMETRY 1: CPT

## 2022-12-17 PROCEDURE — 2580000003 HC RX 258: Performed by: NURSE PRACTITIONER

## 2022-12-17 PROCEDURE — 80048 BASIC METABOLIC PNL TOTAL CA: CPT

## 2022-12-17 PROCEDURE — 2580000003 HC RX 258: Performed by: SURGERY

## 2022-12-17 PROCEDURE — 36415 COLL VENOUS BLD VENIPUNCTURE: CPT

## 2022-12-17 PROCEDURE — 85025 COMPLETE CBC W/AUTO DIFF WBC: CPT

## 2022-12-17 PROCEDURE — 6370000000 HC RX 637 (ALT 250 FOR IP): Performed by: SURGERY

## 2022-12-17 PROCEDURE — 1210000000 HC MED SURG R&B

## 2022-12-17 PROCEDURE — 99024 POSTOP FOLLOW-UP VISIT: CPT | Performed by: SURGERY

## 2022-12-17 PROCEDURE — 82947 ASSAY GLUCOSE BLOOD QUANT: CPT

## 2022-12-17 PROCEDURE — C9113 INJ PANTOPRAZOLE SODIUM, VIA: HCPCS | Performed by: SURGERY

## 2022-12-17 RX ADMIN — KETOROLAC TROMETHAMINE 15 MG: 30 INJECTION, SOLUTION INTRAMUSCULAR; INTRAVENOUS at 10:46

## 2022-12-17 RX ADMIN — SODIUM CHLORIDE, PRESERVATIVE FREE 10 ML: 5 INJECTION INTRAVENOUS at 09:49

## 2022-12-17 RX ADMIN — KETOROLAC TROMETHAMINE 15 MG: 30 INJECTION, SOLUTION INTRAMUSCULAR; INTRAVENOUS at 03:54

## 2022-12-17 RX ADMIN — SODIUM CHLORIDE, PRESERVATIVE FREE 40 MG: 5 INJECTION INTRAVENOUS at 10:46

## 2022-12-17 RX ADMIN — FLUTICASONE PROPIONATE 1 SPRAY: 50 SPRAY, METERED NASAL at 13:38

## 2022-12-17 RX ADMIN — ENOXAPARIN SODIUM 30 MG: 100 INJECTION SUBCUTANEOUS at 09:48

## 2022-12-17 RX ADMIN — KETOROLAC TROMETHAMINE 15 MG: 30 INJECTION, SOLUTION INTRAMUSCULAR; INTRAVENOUS at 16:53

## 2022-12-17 RX ADMIN — SODIUM CHLORIDE, POTASSIUM CHLORIDE, SODIUM LACTATE AND CALCIUM CHLORIDE: 600; 310; 30; 20 INJECTION, SOLUTION INTRAVENOUS at 20:07

## 2022-12-17 RX ADMIN — ENOXAPARIN SODIUM 30 MG: 100 INJECTION SUBCUTANEOUS at 20:08

## 2022-12-17 RX ADMIN — MORPHINE SULFATE 4 MG: 4 INJECTION, SOLUTION INTRAMUSCULAR; INTRAVENOUS at 03:54

## 2022-12-17 RX ADMIN — MORPHINE SULFATE 4 MG: 4 INJECTION, SOLUTION INTRAMUSCULAR; INTRAVENOUS at 14:05

## 2022-12-17 RX ADMIN — SODIUM CHLORIDE, POTASSIUM CHLORIDE, SODIUM LACTATE AND CALCIUM CHLORIDE: 600; 310; 30; 20 INJECTION, SOLUTION INTRAVENOUS at 03:55

## 2022-12-17 RX ADMIN — MORPHINE SULFATE 4 MG: 4 INJECTION, SOLUTION INTRAMUSCULAR; INTRAVENOUS at 21:17

## 2022-12-17 ASSESSMENT — PAIN SCALES - GENERAL
PAINLEVEL_OUTOF10: 8
PAINLEVEL_OUTOF10: 3
PAINLEVEL_OUTOF10: 3
PAINLEVEL_OUTOF10: 2
PAINLEVEL_OUTOF10: 7
PAINLEVEL_OUTOF10: 8
PAINLEVEL_OUTOF10: 8
PAINLEVEL_OUTOF10: 0
PAINLEVEL_OUTOF10: 5

## 2022-12-17 ASSESSMENT — PAIN DESCRIPTION - LOCATION
LOCATION: ABDOMEN

## 2022-12-17 ASSESSMENT — PAIN DESCRIPTION - ORIENTATION
ORIENTATION: MID
ORIENTATION: MID
ORIENTATION: MID;UPPER

## 2022-12-17 ASSESSMENT — PAIN SCALES - WONG BAKER
WONGBAKER_NUMERICALRESPONSE: 0
WONGBAKER_NUMERICALRESPONSE: 0

## 2022-12-17 ASSESSMENT — PAIN DESCRIPTION - DESCRIPTORS
DESCRIPTORS: ACHING
DESCRIPTORS: SHARP
DESCRIPTORS: ACHING

## 2022-12-17 NOTE — PROGRESS NOTES
[facility]      Patient:  Mellissa Vyas  YOB: 1945  Date of Service: 12/17/2022  MRN: [id number]   Acct: [de-identified]   Primary Care Physician: CHERYL Villa - NP  Advance Directive: Full Code  Admit Date: 12/11/2022       Hospital Day: 6  Portions of this note have been copied forward, however, changed to reflect the most current clinical status of this patient. CHIEF COMPLAINT Abdominal pain    SUBJECTIVE:  S/P ex lap due to ongoing SBO on 12/14/2022. States that he is overall feeling better. NGT clamped. HTN improved with pain control. No events or issues overnight. He is ambulating and passing flatus. CUMULATIVE HOSPITAL STAY:  The patient is a 77 yo male with a PMH of pacer, BPH, HTN, HLD, GIST with gastrectomy who presented to [facility] ED on 12/11/2022 with c/o epigastric pain, N/V, and diarrhea for ~1 week that was worsening in nature. He additionally reported a URI that had been improving until the day prior to admission. Further ED work-up revealed that the pt was + for Covid 19, CT of the abdomen and pelvis showed SBO with transition point suspected to be within the hemiabdomen. Upstream bowel loops measured up to 4.5cm. Labs revealed hyponatremia with an Na-128 and Cl-91. The patient was admitted to hospital medicine due to SBO and hyponatremia with a general surgery consult. An NGT was placed to LIS and general surgery plans to treat conservatively upon admission. NGT remains at LIS and the patient remains strict NPO. He ultimately had failure of conserative treatment with NGT and bowel decompression and is  S/P ex lap with omentectomy and lysis of adhesions on 12/14/2022 per Dr. Goldsmith. Pain well controlled-NGT clamped possible clear liquid diet this evening per Dr. Jonas Lo. IVF's continue with electrolytes being relatively [id number], Cl-101. Mild leukocytosis has resolved with WBC-13.2-felt to be reactionary.  H&H-11.9/35.1-mild anemia felt to be r/t surgical Extraocular Movements: Extraocular movements intact. Conjunctiva/sclera: Conjunctivae normal.      Pupils: Pupils are equal, round, and reactive to light. Cardiovascular:      Rate and Rhythm: Normal rate and regular rhythm. Pulses: Normal pulses. Heart sounds: Normal heart sounds. Pulmonary:      Effort: Pulmonary effort is normal. No respiratory distress. Breath sounds: Normal breath sounds. No wheezing, rhonchi or rales. Abdominal:      General: Bowel sounds are decreased. There is no distension. Palpations: Abdomen is soft. Tenderness: There is abdominal tenderness (postoperative). Comments: Significant improvement in BS today but remains hypoactive   Musculoskeletal:         General: No swelling, tenderness or deformity. Normal range of motion. Cervical back: Normal range of motion and neck supple. No muscular tenderness. Right lower leg: No edema. Left lower leg: No edema. Skin:     General: Skin is warm and dry. Findings: No bruising or lesion. Neurological:      Mental Status: He is alert and oriented to person, place, and time. Motor: Weakness (mild) present.               Medications:      dextrose      lactated ringers 75 mL/hr at 12/17/22 0355    sodium chloride        insulin lispro  0-4 Units SubCUTAneous TID WC    insulin lispro  0-4 Units SubCUTAneous Nightly    ketorolac  15 mg IntraVENous Q6H    pantoprazole (PROTONIX) 40 mg injection  40 mg IntraVENous Daily    fluticasone  1 spray Each Nostril Daily    sodium chloride flush  5-40 mL IntraVENous 2 times per day    enoxaparin  30 mg SubCUTAneous BID    [Held by provider] aspirin  81 mg Oral Once per day on Mon Wed Fri    [Held by provider] tamsulosin  0.4 mg Oral Daily     glucose, dextrose bolus **OR** dextrose bolus, glucagon (rDNA), dextrose, ondansetron **OR** ondansetron, morphine **OR** morphine, sodium chloride  Diet NPO Exceptions are: Sips of Water with Meds     Lab and other Data:     Recent Labs     12/15/22  0334 12/16/22  0345 12/17/22  0327   WBC 13.0* 10.3 13.2*   HGB 14.4 12.5* 11.9*    225 243       Recent Labs     12/15/22  0334 12/16/22  0345 12/17/22  0327   * 137 137   K 4.2 3.9 3.8    101 102   CO2 21* 26 23   BUN 19 22 17   CREATININE 0.7 0.9 0.6   GLUCOSE 141* 114* 93         RAD:   CT ABDOMEN PELVIS W IV CONTRAST Additional Contrast? None    Result Date: 12/11/2022  1. Small bowel obstruction. Transition point is suspected to be within the right hemiabdomen (series 2 image 50). Upstream bowel loops measure up to 4.5 cm. Please note this is adjacent to suture material within the right aspect of the transverse colon and stomach and may relate to adhesions. 2. No evidence of perforation or free air. Mild mesenteric ascites which may be reactive in nature. 3. No other acute findings. 4. Incidental findings as described in the body of report. Communications:12/11/22 04:52 Verify Receipt Verified receipt with YANETH Allen. on 12/11 04:52 (-06:00)Electronically signed by Michelle Wong MD on 12-11-22 at 0428         Assessment/Plan   Principal Problem:    SBO (small bowel obstruction) (HCC)   -POD #3 ex lap lysis of adhesion and omentectomy -per Dr. Israel Pollard surgery following   -Pain management   -Continue IVF's   -continue NGT clamped   -NPO with ice chip-possible clears today   -Daily labs    -Strict I&O   -Monitor vitals    -VTE prophylaxis   -Pain and nausea management   -Supportive care    Active Problems:    Hyponatremia   -Resolved   -Today's Na-137   -continue IVF's while NPO   -Daily labs   -Monitor    Covid-19   -remains on RA   -Droplet precautions   -Monitor   -Relatively asymptomatic    Hyperglycemia   -Continue low dose correctional protocol   -Hypoglycemic protocol   -Accuchecks    Hypertension   -Will monitor at this time-felt to be r/t pain      Resolved Problems:    * No resolved hospital problems.  *      DVT Prophylaxis: Lovenox    GI prophylaxis:  Protonix    Disposition: CHERYL Concepcion, 12/17/2022 2:23 PM

## 2022-12-17 NOTE — PROGRESS NOTES
Dressing change completed this shift. Moderate amount of serosanguinous drainage to gauze pads underneath tegaderm. Surgical incision looks good, edges well approximated, cleansed and patted dry, new gauze applied to incision and tegaderm applied to site related to drainage, patient tolerated dressing change well without complaints. Patient ABD binder applied while in room and patient is ambulating in room with walker.

## 2022-12-17 NOTE — PROGRESS NOTES
[facility] General Surgery    Progress Note    POD # 3    Subjective:    Resting comfortably in bed. Has been up walking in the hallway without problem. Passing flatus but no bowel movement. Tolerating ice chips. Objective:    Vitals:    12/17/22 0440 12/17/22 0724 12/17/22 0755 12/17/22 1130   BP: 133/80 133/87     Pulse: 96 87     Resp: 18 18     Temp: 97.9 °F (36.6 °C) 98.8 °F (37.1 °C)     TempSrc:  Temporal     SpO2: 92% 93% 93%    Weight:       Height:    6' 1\" (1.854 m)     I/O last 3 completed shifts: In: 5003 [P.O.:160; I.V.:4843]  Out: 1550 [Urine:350; Emesis/NG output:1200]  Ins and outs are incompletely recorded and the above numbers are not reflective of her actual intake and output     Abdomen is slightly rounded but soft. Appropriately tender to palpation. Incision is clean and dry. Bowel sounds are present but slightly hypoactive. LABS:   CBC:   Recent Labs     12/15/22  0334 12/16/22  0345 12/17/22  0327   WBC 13.0* 10.3 13.2*   RBC 4.34* 3.79* 3.56*   HGB 14.4 12.5* 11.9*   HCT 41.8* 37.3* 35.1*    225 243   LYMPHOPCT 2.7* 6.3* 3.7*   MONOPCT 4.5 5.4 4.1   BASOPCT 0.2 0.1 0.2   MONOSABS 0.60 0.60 0.50   LYMPHSABS 0.4* 0.7* 0.5*   EOSABS 0.00 0.00 0.20   BASOSABS 0.00 0.00 0.00      BMP:   Recent Labs     12/15/22  0334 12/16/22 0345 12/17/22 0327   * 137 137   K 4.2 3.9 3.8    101 102   CO2 21* 26 23   ANIONGAP 13 10 12   GLUCOSE 141* 114* 93   CREATININE 0.7 0.9 0.6   LABGLOM >60 >60 >60   CALCIUM 8.4* 8.7* 8.0*     LFT: No results for input(s): PROT, LABALBU, BILITOT, ALKPHOS, ALT, AST in the last 72 hours. Assessment:    1. Continued satisfactory recovery post laparotomy, lysis of adhesions and segmental small bowel resection for relief of a mechanical small bowel obstruction. 2.  COVID-19 respiratory infection with minimal symptoms. Plan:    1. NG clamp trial and remove if appropriate.   2.  I encouraged him to walk in the hallway and sit in his bedside chair. 3.  Once NG tube removed we can start clear liquid diet, I expect probably for breakfast tomorrow morning. 4.  Otherwise continue current care.

## 2022-12-17 NOTE — PROGRESS NOTES
Comprehensive Nutrition Assessment    Type and Reason for Visit:  Initial, RD Nutrition Re-Screen/LOS    Nutrition Recommendations/Plan:   Follow for  advancing diet. Malnutrition Assessment:  Malnutrition Status: At risk for malnutrition (Comment) (12/17/22 1142)    Context:  Acute Illness     Findings of the 6 clinical characteristics of malnutrition:  Energy Intake:  75% or less of estimated energy requirements for 7 or more days  Weight Loss:  Unable to assess     Body Fat Loss:  No significant body fat loss     Muscle Mass Loss:  No significant muscle mass loss    Fluid Accumulation:  No significant fluid accumulation Extremities   Strength:  Not Performed    Nutrition Assessment:    Following patient for LOS x 6 days. Pt has been NPO since admission, starting day 6. Aware has some flatus. NG to SANIA roth. Nutrition Related Findings:      Wound Type: Surgical Incision       Current Nutrition Intake & Therapies:    Average Meal Intake: NPO  Average Supplements Intake: NPO  Diet NPO Exceptions are: Sips of Water with Meds    Anthropometric Measures:  Height: 6' 1\" (185.4 cm)  Ideal Body Weight (IBW): 184 lbs (84 kg)    Admission Body Weight: 225 lb (102.1 kg)  Current Body Weight: 225 lb (102.1 kg), 122.3 % IBW. Current BMI (kg/m2): BMI Categories: Overweight (BMI 25.0-29. 9)    Estimated Daily Nutrient Needs:  Energy Requirements Based On: Kcal/kg  Weight Used for Energy Requirements: Current  Energy (kcal/day): 9111-9116 kcals  Weight Used for Protein Requirements: Ideal  Protein (g/day): 108-167g  Method Used for Fluid Requirements: 1 ml/kcal  Fluid (ml/day): 0334-4817 ml    Nutrition Diagnosis:   Inadequate oral intake related to acute injury/trauma as evidenced by NPO or clear liquid status due to medical condition    Nutrition Interventions:   Food and/or Nutrient Delivery: Continue NPO  Nutrition Education/Counseling: No recommendation at this time  Coordination of Nutrition Care: Continue to monitor while inpatient       Goals:     Goals: Meet at least 75% of estimated needs       Nutrition Monitoring and Evaluation:   Behavioral-Environmental Outcomes: None Identified  Food/Nutrient Intake Outcomes: Diet Advancement/Tolerance  Physical Signs/Symptoms Outcomes: Biochemical Data, Fluid Status or Edema, Weight, Skin    Discharge Planning:     Too soon to determine     Kajal Spencer MS, RD, LD  Contact: [phone]

## 2022-12-18 LAB
ANION GAP SERPL CALCULATED.3IONS-SCNC: 13 MMOL/L (ref 7–19)
BASOPHILS ABSOLUTE: 0 K/UL (ref 0–0.2)
BASOPHILS RELATIVE PERCENT: 0.2 % (ref 0–1)
BUN BLDV-MCNC: 18 MG/DL (ref 8–23)
CALCIUM SERPL-MCNC: 8.2 MG/DL (ref 8.8–10.2)
CHLORIDE BLD-SCNC: 100 MMOL/L (ref 98–111)
CO2: 24 MMOL/L (ref 22–29)
CREAT SERPL-MCNC: 0.6 MG/DL (ref 0.5–1.2)
EOSINOPHILS ABSOLUTE: 0.4 K/UL (ref 0–0.6)
EOSINOPHILS RELATIVE PERCENT: 3.3 % (ref 0–5)
GFR SERPL CREATININE-BSD FRML MDRD: >60 ML/MIN/{1.73_M2}
GLUCOSE BLD-MCNC: 101 MG/DL (ref 74–109)
GLUCOSE BLD-MCNC: 111 MG/DL (ref 70–99)
GLUCOSE BLD-MCNC: 118 MG/DL (ref 70–99)
GLUCOSE BLD-MCNC: 144 MG/DL (ref 70–99)
GLUCOSE BLD-MCNC: 99 MG/DL (ref 70–99)
HCT VFR BLD CALC: 34.9 % (ref 42–52)
HEMOGLOBIN: 11.5 G/DL (ref 14–18)
IMMATURE GRANULOCYTES #: 0.1 K/UL
LYMPHOCYTES ABSOLUTE: 0.7 K/UL (ref 1.1–4.5)
LYMPHOCYTES RELATIVE PERCENT: 6.4 % (ref 20–40)
MAGNESIUM: 1.7 MG/DL (ref 1.6–2.4)
MCH RBC QN AUTO: 33.1 PG (ref 27–31)
MCHC RBC AUTO-ENTMCNC: 33 G/DL (ref 33–37)
MCV RBC AUTO: 100.6 FL (ref 80–94)
MONOCYTES ABSOLUTE: 0.7 K/UL (ref 0–0.9)
MONOCYTES RELATIVE PERCENT: 6 % (ref 0–10)
NEUTROPHILS ABSOLUTE: 9.2 K/UL (ref 1.5–7.5)
NEUTROPHILS RELATIVE PERCENT: 83.3 % (ref 50–65)
PDW BLD-RTO: 12.1 % (ref 11.5–14.5)
PERFORMED ON: ABNORMAL
PERFORMED ON: NORMAL
PLATELET # BLD: 266 K/UL (ref 130–400)
PMV BLD AUTO: 8.3 FL (ref 9.4–12.4)
POTASSIUM REFLEX MAGNESIUM: 3.5 MMOL/L (ref 3.5–5)
RBC # BLD: 3.47 M/UL (ref 4.7–6.1)
SODIUM BLD-SCNC: 137 MMOL/L (ref 136–145)
WBC # BLD: 11.1 K/UL (ref 4.8–10.8)

## 2022-12-18 PROCEDURE — 82947 ASSAY GLUCOSE BLOOD QUANT: CPT

## 2022-12-18 PROCEDURE — 6360000002 HC RX W HCPCS: Performed by: SURGERY

## 2022-12-18 PROCEDURE — 83735 ASSAY OF MAGNESIUM: CPT

## 2022-12-18 PROCEDURE — 6370000000 HC RX 637 (ALT 250 FOR IP): Performed by: SURGERY

## 2022-12-18 PROCEDURE — 1210000000 HC MED SURG R&B

## 2022-12-18 PROCEDURE — 80048 BASIC METABOLIC PNL TOTAL CA: CPT

## 2022-12-18 PROCEDURE — 99024 POSTOP FOLLOW-UP VISIT: CPT | Performed by: SURGERY

## 2022-12-18 PROCEDURE — C9113 INJ PANTOPRAZOLE SODIUM, VIA: HCPCS | Performed by: SURGERY

## 2022-12-18 PROCEDURE — 94760 N-INVAS EAR/PLS OXIMETRY 1: CPT

## 2022-12-18 PROCEDURE — 2580000003 HC RX 258: Performed by: NURSE PRACTITIONER

## 2022-12-18 PROCEDURE — 2580000003 HC RX 258: Performed by: SURGERY

## 2022-12-18 PROCEDURE — 36415 COLL VENOUS BLD VENIPUNCTURE: CPT

## 2022-12-18 PROCEDURE — 85025 COMPLETE CBC W/AUTO DIFF WBC: CPT

## 2022-12-18 RX ADMIN — MORPHINE SULFATE 4 MG: 4 INJECTION, SOLUTION INTRAMUSCULAR; INTRAVENOUS at 20:36

## 2022-12-18 RX ADMIN — MORPHINE SULFATE 2 MG: 2 INJECTION, SOLUTION INTRAMUSCULAR; INTRAVENOUS at 15:54

## 2022-12-18 RX ADMIN — SODIUM CHLORIDE, PRESERVATIVE FREE 10 ML: 5 INJECTION INTRAVENOUS at 07:44

## 2022-12-18 RX ADMIN — FLUTICASONE PROPIONATE 1 SPRAY: 50 SPRAY, METERED NASAL at 07:41

## 2022-12-18 RX ADMIN — ENOXAPARIN SODIUM 30 MG: 100 INJECTION SUBCUTANEOUS at 20:04

## 2022-12-18 RX ADMIN — MORPHINE SULFATE 4 MG: 4 INJECTION, SOLUTION INTRAMUSCULAR; INTRAVENOUS at 07:40

## 2022-12-18 RX ADMIN — SODIUM CHLORIDE, PRESERVATIVE FREE 40 MG: 5 INJECTION INTRAVENOUS at 07:40

## 2022-12-18 RX ADMIN — ENOXAPARIN SODIUM 30 MG: 100 INJECTION SUBCUTANEOUS at 07:40

## 2022-12-18 RX ADMIN — MORPHINE SULFATE 4 MG: 4 INJECTION, SOLUTION INTRAMUSCULAR; INTRAVENOUS at 11:01

## 2022-12-18 RX ADMIN — TAMSULOSIN HYDROCHLORIDE 0.4 MG: 0.4 CAPSULE ORAL at 13:18

## 2022-12-18 RX ADMIN — ONDANSETRON 4 MG: 2 INJECTION INTRAMUSCULAR; INTRAVENOUS at 20:42

## 2022-12-18 RX ADMIN — SODIUM CHLORIDE, POTASSIUM CHLORIDE, SODIUM LACTATE AND CALCIUM CHLORIDE: 600; 310; 30; 20 INJECTION, SOLUTION INTRAVENOUS at 10:56

## 2022-12-18 ASSESSMENT — PAIN SCALES - WONG BAKER: WONGBAKER_NUMERICALRESPONSE: 0

## 2022-12-18 ASSESSMENT — PAIN DESCRIPTION - LOCATION
LOCATION: ABDOMEN

## 2022-12-18 ASSESSMENT — PAIN SCALES - GENERAL
PAINLEVEL_OUTOF10: 6
PAINLEVEL_OUTOF10: 0
PAINLEVEL_OUTOF10: 9
PAINLEVEL_OUTOF10: 9
PAINLEVEL_OUTOF10: 0

## 2022-12-18 ASSESSMENT — PAIN DESCRIPTION - ORIENTATION: ORIENTATION: MID

## 2022-12-18 ASSESSMENT — PAIN DESCRIPTION - DESCRIPTORS: DESCRIPTORS: ACHING

## 2022-12-18 NOTE — PROGRESS NOTES
NGT clamped for the second trial for four hours, at the 0200 draining there was no drainage out of the NGT.

## 2022-12-18 NOTE — PROGRESS NOTES
Pt was assisted to walk from 436 to 440, to 432 back to 440 then to room with his walker. Pt tolerated well.

## 2022-12-18 NOTE — PROGRESS NOTES
[facility]      Patient:  Houston Fournier  YOB: 1945  Date of Service: 12/18/2022  MRN: [id number]   Acct: [de-identified]   Primary Care Physician: CHERYL Craig NP  Advance Directive: Full Code  Admit Date: 12/11/2022       Hospital Day: 7  Portions of this note have been copied forward, however, changed to reflect the most current clinical status of this patient. CHIEF COMPLAINT Abdominal pain    SUBJECTIVE:  S/P ex lap due to ongoing SBO on 12/14/2022. States that he is overall feeling better. NGT removed and tolerating clear diet. Mildly hypertensive due to pain. Remains afebrile. CUMULATIVE HOSPITAL STAY:  The patient is a 77 yo male with a PMH of pacer, BPH, HTN, HLD, GIST with gastrectomy who presented to [facility] ED on 12/11/2022 with c/o epigastric pain, N/V, and diarrhea for ~1 week that was worsening in nature. He additionally reported a URI that had been improving until the day prior to admission. Further ED work-up revealed that the pt was + for Covid 19, CT of the abdomen and pelvis showed SBO with transition point suspected to be within the hemiabdomen. Upstream bowel loops measured up to 4.5cm. Labs revealed hyponatremia with an Na-128 and Cl-91. The patient was admitted to hospital medicine due to SBO and hyponatremia with a general surgery consult. An NGT was placed to LIS and general surgery plans to treat conservatively upon admission. NGT remains at LIS and the patient remains strict NPO. He ultimately had failure of conserative treatment with NGT and bowel decompression and is  S/P ex lap with omentectomy and lysis of adhesions on 12/14/2022 per Dr. Mallory Souza. NGT removed and advanced to clear liquid diet today. IVF's continue with electrolytes being relatively [id number], Cl-101. Mild leukocytosis has resolved with WBC-13.2-felt to be reactionary. H&H-11.5/34.9-mild anemia felt to be r/t surgical intervention on 12/14/2022, platelets-266.       Review of Systems:   Review of Systems   Constitutional:  Negative for chills, diaphoresis, fatigue and fever. HENT:  Negative for congestion and ear pain. Eyes:  Negative for visual disturbance. Respiratory:  Negative for cough, shortness of breath and wheezing. Cardiovascular:  Negative for chest pain, palpitations and leg swelling. Gastrointestinal:  Positive abdominal pain. Negative for blood in stool, constipation, diarrhea, nausea and vomiting. Endocrine: Negative for cold intolerance and heat intolerance. Genitourinary:  Negative for difficulty urinating, flank pain, frequency and urgency. Musculoskeletal:  Negative for arthralgias and myalgias. Skin:  Negative for color change and wound. Neurological:  Negative for dizziness, syncope, weakness, light-headedness, numbness and headaches. Hematological:  Does not bruise/bleed easily. Psychiatric/Behavioral:  Negative for agitation, confusion and dysphoric mood. 14 point review of systems is negative except as specifically addressed above. Objective:   VITALS:  BP (!) 155/83   Pulse 81   Temp 98.2 °F (36.8 °C) (Oral)   Resp 17   Ht 6' 1\" (1.854 m)   Wt 225 lb (102.1 kg)   SpO2 93%   BMI 29.69 kg/m²   24HR INTAKE/OUTPUT:    Intake/Output Summary (Last 24 hours) at 12/18/2022 1556  Last data filed at 12/18/2022 0428  Gross per 24 hour   Intake 1532 ml   Output 125 ml   Net 1407 ml         Physical Exam  Vitals and nursing note reviewed. Physical Exam  Vitals and nursing note reviewed. Constitutional:       General: He is not in acute distress. Appearance: Normal appearance. He is ill-appearing. HENT:      Head: Normocephalic and atraumatic. Right Ear: External ear normal.      Left Ear: External ear normal.      Nose: Nose normal.      Comments: NGT right nare-brown gastric drainage     Mouth/Throat:      Mouth: Mucous membranes are dry. Eyes:      Extraocular Movements: Extraocular movements intact. Conjunctiva/sclera: Conjunctivae normal.      Pupils: Pupils are equal, round, and reactive to light. Cardiovascular:      Rate and Rhythm: Normal rate and regular rhythm. Pulses: Normal pulses. Heart sounds: Normal heart sounds. Pulmonary:      Effort: Pulmonary effort is normal. No respiratory distress. Breath sounds: Normal breath sounds. No wheezing, rhonchi or rales. Abdominal:      General: Bowel sounds are normal. There is no distension. Palpations: Abdomen is soft. Tenderness: There is abdominal tenderness (postoperative). Musculoskeletal:         General: No swelling, tenderness or deformity. Normal range of motion. Cervical back: Normal range of motion and neck supple. No muscular tenderness. Right lower leg: No edema. Left lower leg: No edema. Skin:     General: Skin is warm and dry. Findings: No bruising or lesion. Neurological:      Mental Status: He is alert and oriented to person, place, and time. Motor: Weakness (mild) present. Medications:      dextrose      lactated ringers 75 mL/hr at 12/18/22 1056    sodium chloride        insulin lispro  0-4 Units SubCUTAneous TID WC    insulin lispro  0-4 Units SubCUTAneous Nightly    pantoprazole (PROTONIX) 40 mg injection  40 mg IntraVENous Daily    fluticasone  1 spray Each Nostril Daily    sodium chloride flush  5-40 mL IntraVENous 2 times per day    enoxaparin  30 mg SubCUTAneous BID    aspirin  81 mg Oral Once per day on Mon Wed Fri    tamsulosin  0.4 mg Oral Daily     glucose, dextrose bolus **OR** dextrose bolus, glucagon (rDNA), dextrose, ondansetron **OR** ondansetron, morphine **OR** morphine, sodium chloride  ADULT DIET;  Clear Liquid     Lab and other Data:     Recent Labs     12/16/22 0345 12/17/22 0327 12/18/22  0326   WBC 10.3 13.2* 11.1*   HGB 12.5* 11.9* 11.5*    243 266       Recent Labs     12/16/22  0345 12/17/22  0327 12/18/22  0326    137 137   K 3.9 3.8 3.5    102 100   CO2 26 23 24   BUN 22 17 18   CREATININE 0.9 0.6 0.6   GLUCOSE 114* 93 101         RAD:   CT ABDOMEN PELVIS W IV CONTRAST Additional Contrast? None    Result Date: 12/11/2022  1. Small bowel obstruction. Transition point is suspected to be within the right hemiabdomen (series 2 image 50). Upstream bowel loops measure up to 4.5 cm. Please note this is adjacent to suture material within the right aspect of the transverse colon and stomach and may relate to adhesions. 2. No evidence of perforation or free air. Mild mesenteric ascites which may be reactive in nature. 3. No other acute findings. 4. Incidental findings as described in the body of report. Communications:12/11/22 04:52 Verify Receipt Verified receipt with YANETH Canseco. on 12/11 04:52 (-06:00)Electronically signed by Brandi Jansen MD on 12-11-22 at 0425         Assessment/Plan   Principal Problem:    SBO (small bowel obstruction) (HCC)   -POD #4 ex lap lysis of adhesion and omentectomy -per Dr. Castillo Brown   -General surgery following   -Pain management   -Continue IVF's   -NGT removed   -clear liquid diet   -Daily labs    -Strict I&O   -Monitor vitals    -VTE prophylaxis   -Pain and nausea management   -Supportive care    Active Problems:    Hyponatremia   -Resolved   -Today's Na-137   -continue IVF's    -Daily labs   -Monitor    Covid-19   -remains on RA   -Droplet precautions   -Monitor   -Relatively asymptomatic    Hyperglycemia   -Continue low dose correctional protocol   -Hypoglycemic protocol   -Accuchecks    Hypertension   -Will monitor at this time-felt to be r/t pain      Resolved Problems:    * No resolved hospital problems.  *      DVT Prophylaxis: Lovenox    GI prophylaxis:  Protonix    Disposition: CHERYL Armstrong, 12/18/2022 3:56 PM

## 2022-12-18 NOTE — PLAN OF CARE
Problem: ABCDS Injury Assessment  Goal: Absence of physical injury  Outcome: Progressing     Problem: Pain  Goal: Verbalizes/displays adequate comfort level or baseline comfort level  Outcome: Progressing     Problem: Gastrointestinal - Adult  Goal: Minimal or absence of nausea and vomiting  Outcome: Progressing  Goal: Maintains or returns to baseline bowel function  Outcome: Progressing  Goal: Maintains adequate nutritional intake  Outcome: Progressing  Goal: Establish and maintain optimal ostomy function  Outcome: Progressing     Problem: Infection - Adult  Goal: Absence of infection at discharge  Outcome: Progressing  Goal: Absence of infection during hospitalization  Outcome: Progressing  Goal: Absence of fever/infection during anticipated neutropenic period  Outcome: Progressing     Problem: Discharge Planning  Goal: Discharge to home or other facility with appropriate resources  Outcome: Progressing     Problem: Safety - Adult  Goal: Free from fall injury  Outcome: Progressing     Problem: Nutrition Deficit:  Goal: Optimize nutritional status  Outcome: Progressing

## 2022-12-18 NOTE — PROGRESS NOTES
[facility] General Surgery    Progress Note    POD # 4    Subjective:    Resting in bed. Reports being comfortable. Passing flatus. NG tube removed earlier, no further nausea. Has started on clear liquids and is enjoying these. Up walking in the hallway without problem. Objective:    Vitals:    12/17/22 2147 12/18/22 0428 12/18/22 0722 12/18/22 0830   BP:  (!) 149/80  (!) 155/83   Pulse:  83  81   Resp: 18 18 17   Temp:  98.9 °F (37.2 °C)  98.2 °F (36.8 °C)   TempSrc:    Oral   SpO2:  94% 93% 93%   Weight:       Height:         I/O last 3 completed shifts: In: 5110 [I.V.:1532]  Out: 1894 [Urine:100; Emesis/NG output:1325]     Abdomen is slightly distended but soft. Incision is clean, dry and intact. Bowel sounds present. Mild discomfort with palpation as expected. LABS:   CBC:   Recent Labs     12/16/22 0345 12/17/22 0327 12/18/22 0326   WBC 10.3 13.2* 11.1*   RBC 3.79* 3.56* 3.47*   HGB 12.5* 11.9* 11.5*   HCT 37.3* 35.1* 34.9*    243 266   LYMPHOPCT 6.3* 3.7* 6.4*   MONOPCT 5.4 4.1 6.0   BASOPCT 0.1 0.2 0.2   MONOSABS 0.60 0.50 0.70   LYMPHSABS 0.7* 0.5* 0.7*   EOSABS 0.00 0.20 0.40   BASOSABS 0.00 0.00 0.00      BMP:   Recent Labs     12/16/22  0345 12/17/22 0327 12/18/22 0326    137 137   K 3.9 3.8 3.5    102 100   CO2 26 23 24   ANIONGAP 10 12 13   GLUCOSE 114* 93 101   CREATININE 0.9 0.6 0.6   LABGLOM >60 >60 >60   CALCIUM 8.7* 8.0* 8.2*     LFT: No results for input(s): PROT, LABALBU, BILITOT, ALKPHOS, ALT, AST in the last 72 hours. Assessment:    1. Continued satisfactory recovery post lysis of adhesions with segmental small bowel resection for treatment of a mechanical small bowel obstruction secondary to adhesions. GI function is beginning to return. 2.  COVID-19 respiratory infection with minimal symptoms. Plan:    1. Agree with clear liquid diet. 2.  I encouraged him to continue walking in the hallway with help from the staff.   3. Otherwise continue current care. 4.  Dr. Kobi Willams will resume his surgical care tomorrow morning.

## 2022-12-19 ENCOUNTER — APPOINTMENT (OUTPATIENT)
Dept: GENERAL RADIOLOGY | Age: 77
End: 2022-12-19
Payer: MEDICARE

## 2022-12-19 ENCOUNTER — APPOINTMENT (OUTPATIENT)
Dept: CT IMAGING | Age: 77
End: 2022-12-19
Payer: MEDICARE

## 2022-12-19 LAB
ANION GAP SERPL CALCULATED.3IONS-SCNC: 11 MMOL/L (ref 7–19)
BACTERIA: NEGATIVE /HPF
BASOPHILS ABSOLUTE: 0 K/UL (ref 0–0.2)
BASOPHILS RELATIVE PERCENT: 0.3 % (ref 0–1)
BILIRUBIN URINE: NEGATIVE
BLOOD, URINE: ABNORMAL
BUN BLDV-MCNC: 12 MG/DL (ref 8–23)
CALCIUM SERPL-MCNC: 8.3 MG/DL (ref 8.8–10.2)
CHLORIDE BLD-SCNC: 101 MMOL/L (ref 98–111)
CLARITY: CLEAR
CO2: 25 MMOL/L (ref 22–29)
COLOR: YELLOW
CREAT SERPL-MCNC: 0.5 MG/DL (ref 0.5–1.2)
CRYSTALS, UA: ABNORMAL /HPF
EOSINOPHILS ABSOLUTE: 0.3 K/UL (ref 0–0.6)
EOSINOPHILS RELATIVE PERCENT: 2 % (ref 0–5)
EPITHELIAL CELLS, UA: 2 /HPF (ref 0–5)
GFR SERPL CREATININE-BSD FRML MDRD: >60 ML/MIN/{1.73_M2}
GLUCOSE BLD-MCNC: 101 MG/DL (ref 70–99)
GLUCOSE BLD-MCNC: 105 MG/DL (ref 70–99)
GLUCOSE BLD-MCNC: 110 MG/DL (ref 70–99)
GLUCOSE BLD-MCNC: 114 MG/DL (ref 70–99)
GLUCOSE BLD-MCNC: 133 MG/DL (ref 74–109)
GLUCOSE URINE: NEGATIVE MG/DL
HCT VFR BLD CALC: 34 % (ref 42–52)
HEMOGLOBIN: 11.5 G/DL (ref 14–18)
HYALINE CASTS: 3 /HPF (ref 0–8)
IMMATURE GRANULOCYTES #: 0.1 K/UL
KETONES, URINE: 15 MG/DL
LEUKOCYTE ESTERASE, URINE: NEGATIVE
LYMPHOCYTES ABSOLUTE: 0.3 K/UL (ref 1.1–4.5)
LYMPHOCYTES RELATIVE PERCENT: 2.1 % (ref 20–40)
MAGNESIUM: 1.8 MG/DL (ref 1.6–2.4)
MCH RBC QN AUTO: 32.9 PG (ref 27–31)
MCHC RBC AUTO-ENTMCNC: 33.8 G/DL (ref 33–37)
MCV RBC AUTO: 97.1 FL (ref 80–94)
MONOCYTES ABSOLUTE: 0.7 K/UL (ref 0–0.9)
MONOCYTES RELATIVE PERCENT: 4.6 % (ref 0–10)
NEUTROPHILS ABSOLUTE: 12.7 K/UL (ref 1.5–7.5)
NEUTROPHILS RELATIVE PERCENT: 90.3 % (ref 50–65)
NITRITE, URINE: NEGATIVE
PDW BLD-RTO: 12.3 % (ref 11.5–14.5)
PERFORMED ON: ABNORMAL
PH UA: 6 (ref 5–8)
PLATELET # BLD: 276 K/UL (ref 130–400)
PMV BLD AUTO: 8.9 FL (ref 9.4–12.4)
POTASSIUM REFLEX MAGNESIUM: 3.2 MMOL/L (ref 3.5–5)
PROTEIN UA: 30 MG/DL
RBC # BLD: 3.5 M/UL (ref 4.7–6.1)
RBC UA: 4 /HPF (ref 0–4)
SODIUM BLD-SCNC: 137 MMOL/L (ref 136–145)
SPECIFIC GRAVITY UA: >=1.045 (ref 1–1.03)
UROBILINOGEN, URINE: 1 E.U./DL
WBC # BLD: 14 K/UL (ref 4.8–10.8)
WBC UA: 2 /HPF (ref 0–5)

## 2022-12-19 PROCEDURE — 1210000000 HC MED SURG R&B

## 2022-12-19 PROCEDURE — 99024 POSTOP FOLLOW-UP VISIT: CPT | Performed by: SURGERY

## 2022-12-19 PROCEDURE — 2580000003 HC RX 258: Performed by: NURSE PRACTITIONER

## 2022-12-19 PROCEDURE — 97530 THERAPEUTIC ACTIVITIES: CPT

## 2022-12-19 PROCEDURE — 97116 GAIT TRAINING THERAPY: CPT

## 2022-12-19 PROCEDURE — 36415 COLL VENOUS BLD VENIPUNCTURE: CPT

## 2022-12-19 PROCEDURE — 6370000000 HC RX 637 (ALT 250 FOR IP): Performed by: SURGERY

## 2022-12-19 PROCEDURE — 2580000003 HC RX 258: Performed by: SURGERY

## 2022-12-19 PROCEDURE — 74177 CT ABD & PELVIS W/CONTRAST: CPT

## 2022-12-19 PROCEDURE — 6360000002 HC RX W HCPCS: Performed by: SURGERY

## 2022-12-19 PROCEDURE — 6360000004 HC RX CONTRAST MEDICATION: Performed by: SURGERY

## 2022-12-19 PROCEDURE — 82947 ASSAY GLUCOSE BLOOD QUANT: CPT

## 2022-12-19 PROCEDURE — 85025 COMPLETE CBC W/AUTO DIFF WBC: CPT

## 2022-12-19 PROCEDURE — 94760 N-INVAS EAR/PLS OXIMETRY 1: CPT

## 2022-12-19 PROCEDURE — 71045 X-RAY EXAM CHEST 1 VIEW: CPT

## 2022-12-19 PROCEDURE — 6370000000 HC RX 637 (ALT 250 FOR IP): Performed by: NURSE PRACTITIONER

## 2022-12-19 PROCEDURE — 83735 ASSAY OF MAGNESIUM: CPT

## 2022-12-19 PROCEDURE — 81001 URINALYSIS AUTO W/SCOPE: CPT

## 2022-12-19 PROCEDURE — C9113 INJ PANTOPRAZOLE SODIUM, VIA: HCPCS | Performed by: SURGERY

## 2022-12-19 PROCEDURE — 80048 BASIC METABOLIC PNL TOTAL CA: CPT

## 2022-12-19 PROCEDURE — 2500000003 HC RX 250 WO HCPCS: Performed by: SURGERY

## 2022-12-19 RX ORDER — METRONIDAZOLE 500 MG/100ML
500 INJECTION, SOLUTION INTRAVENOUS EVERY 8 HOURS
Status: DISCONTINUED | OUTPATIENT
Start: 2022-12-19 | End: 2022-12-22 | Stop reason: HOSPADM

## 2022-12-19 RX ORDER — POTASSIUM CHLORIDE 20 MEQ/1
40 TABLET, EXTENDED RELEASE ORAL PRN
Status: DISCONTINUED | OUTPATIENT
Start: 2022-12-19 | End: 2022-12-22 | Stop reason: HOSPADM

## 2022-12-19 RX ORDER — CIPROFLOXACIN 2 MG/ML
400 INJECTION, SOLUTION INTRAVENOUS EVERY 12 HOURS
Status: DISCONTINUED | OUTPATIENT
Start: 2022-12-19 | End: 2022-12-20

## 2022-12-19 RX ORDER — POTASSIUM CHLORIDE 7.45 MG/ML
10 INJECTION INTRAVENOUS PRN
Status: DISCONTINUED | OUTPATIENT
Start: 2022-12-19 | End: 2022-12-22 | Stop reason: HOSPADM

## 2022-12-19 RX ADMIN — MORPHINE SULFATE 4 MG: 4 INJECTION, SOLUTION INTRAMUSCULAR; INTRAVENOUS at 21:50

## 2022-12-19 RX ADMIN — IOPAMIDOL 70 ML: 755 INJECTION, SOLUTION INTRAVENOUS at 13:19

## 2022-12-19 RX ADMIN — SODIUM CHLORIDE, PRESERVATIVE FREE 10 ML: 5 INJECTION INTRAVENOUS at 08:51

## 2022-12-19 RX ADMIN — SODIUM CHLORIDE, POTASSIUM CHLORIDE, SODIUM LACTATE AND CALCIUM CHLORIDE: 600; 310; 30; 20 INJECTION, SOLUTION INTRAVENOUS at 21:54

## 2022-12-19 RX ADMIN — METRONIDAZOLE 500 MG: 500 INJECTION, SOLUTION INTRAVENOUS at 13:48

## 2022-12-19 RX ADMIN — TAMSULOSIN HYDROCHLORIDE 0.4 MG: 0.4 CAPSULE ORAL at 08:50

## 2022-12-19 RX ADMIN — METRONIDAZOLE 500 MG: 500 INJECTION, SOLUTION INTRAVENOUS at 22:15

## 2022-12-19 RX ADMIN — CIPROFLOXACIN 400 MG: 2 INJECTION, SOLUTION INTRAVENOUS at 13:49

## 2022-12-19 RX ADMIN — MORPHINE SULFATE 2 MG: 2 INJECTION, SOLUTION INTRAMUSCULAR; INTRAVENOUS at 13:45

## 2022-12-19 RX ADMIN — ENOXAPARIN SODIUM 30 MG: 100 INJECTION SUBCUTANEOUS at 08:50

## 2022-12-19 RX ADMIN — ASPIRIN 81 MG: 81 TABLET, COATED ORAL at 08:50

## 2022-12-19 RX ADMIN — SODIUM CHLORIDE, PRESERVATIVE FREE 40 MG: 5 INJECTION INTRAVENOUS at 08:50

## 2022-12-19 RX ADMIN — POTASSIUM CHLORIDE 40 MEQ: 1500 TABLET, EXTENDED RELEASE ORAL at 13:47

## 2022-12-19 RX ADMIN — ENOXAPARIN SODIUM 30 MG: 100 INJECTION SUBCUTANEOUS at 22:01

## 2022-12-19 RX ADMIN — FLUTICASONE PROPIONATE 1 SPRAY: 50 SPRAY, METERED NASAL at 08:51

## 2022-12-19 RX ADMIN — MORPHINE SULFATE 4 MG: 4 INJECTION, SOLUTION INTRAMUSCULAR; INTRAVENOUS at 17:09

## 2022-12-19 ASSESSMENT — PAIN SCALES - GENERAL
PAINLEVEL_OUTOF10: 8
PAINLEVEL_OUTOF10: 3
PAINLEVEL_OUTOF10: 0
PAINLEVEL_OUTOF10: 6
PAINLEVEL_OUTOF10: 8

## 2022-12-19 ASSESSMENT — PAIN DESCRIPTION - LOCATION
LOCATION: ABDOMEN
LOCATION: ABDOMEN

## 2022-12-19 ASSESSMENT — PAIN DESCRIPTION - DESCRIPTORS
DESCRIPTORS: SHARP
DESCRIPTORS: SHARP;SORE

## 2022-12-19 ASSESSMENT — PAIN DESCRIPTION - ORIENTATION: ORIENTATION: MID

## 2022-12-19 NOTE — PROGRESS NOTES
Physical Therapy  Name: Harsh Dumont  MRN:  [id number]  Date of service:  12/19/2022 12/19/22 1420   Restrictions/Precautions   Restrictions/Precautions Isolation; Fall Risk;Surgical Protocols   Required Braces or Orthoses? Yes   Implants present? Pacemaker   Position Activity Restriction   Other position/activity restrictions Abdominal binder   Subjective   Subjective ready to walk   Oxygen Therapy   O2 Device None (Room air)   Bed Mobility   Supine to Sit Contact guard assistance   Sit to Supine Stand by assistance   Scooting   (in sitting)   Transfers   Sit to Stand Contact guard assistance   Stand to Sit Contact guard assistance   Ambulation   Surface Level tile   Device Rolling Walker   Assistance Contact guard assistance   Quality of Gait steady   Gait Deviations Slow Sandra;Decreased step length;Decreased step height   Distance 200 feet   Comments pt wanting to get back to bed   Patient Goals    Patient Goals  go home   Short Term Goals   Time Frame for Short Term Goals 2 wks   Short Term Goal 1 supine to sit indep   Short Term Goal 2 sit to stand indep   Short Term Goal 3 amb. 200' indep   Conditions Requiring Skilled Therapeutic Intervention   Body Structures, Functions, Activity Limitations Requiring Skilled Therapeutic Intervention Decreased functional mobility ; Decreased ROM; Decreased endurance;Decreased cognition;Decreased strength;Decreased balance; Increased pain   Activity Tolerance   Activity Tolerance Patient tolerated treatment well   PT Plan of Care   Monday X   Safety Devices   Type of Devices Call light within reach; Left in bed       Electronically signed by Sumeet Spicer PTA on 12/19/2022 at 4:43 PM

## 2022-12-19 NOTE — PROGRESS NOTES
[facility] General Surgery Progress Note    Chief Complaint:  Chief Complaint   Patient presents with    Abdominal Pain     Pt reports abdominal pain. Emesis     Pt reports vomiting. Pt reports diarrhea this morning. Pt states that he has not had a normal BM in a week. POD # 5    S: Mr. Israel Harrell is seen and examined at bedside. He had a large BM this morning. He states he feels kind of lousy. He has ambulated int he halls. He continues to pass flatus as well. One episode of emesis yesterday. Per reports by nurse, he had some desaturations following this and was weaned off oxygen overnight. O:   BP (!) 147/85   Pulse 99   Temp 98.8 °F (37.1 °C) (Temporal)   Resp 18   Ht 6' 1\" (1.854 m)   Wt 225 lb (102.1 kg)   SpO2 97%   BMI 29.69 kg/m²   I/O reviewed and appropriate  CONSTITUTIONAL: Alert, appropriate, no acute distress  SKIN: warm, dry with no rashes or lesions  HEENT: NCAT, Non icteric, PERR. Trachea Midline. CHEST/LUNGS: CTA bilaterally. Normal respiratory effort. CARDIOVASCULAR: RRR, No edema. ABDOMEN: soft, ND, appropriately TTP, +BS. Abd binder in place. Incisions: Clean, dry, and intact with staples; no erythema or induration  NEUROLOGIC: CN II-XI grossly intact, no motor or sensory deficits   MUSCULOSKELETAL: No clubbing or cyanosis. PSYCHIATRIC:  Normal Mood and Affect. Alert and Oriented.     LABS:   CBC:   Recent Labs     12/17/22  0327 12/18/22  0326 12/19/22  0258   WBC 13.2* 11.1* 14.0*   RBC 3.56* 3.47* 3.50*   HGB 11.9* 11.5* 11.5*   HCT 35.1* 34.9* 34.0*    266 276   LYMPHOPCT 3.7* 6.4* 2.1*   MONOPCT 4.1 6.0 4.6   BASOPCT 0.2 0.2 0.3   MONOSABS 0.50 0.70 0.70   LYMPHSABS 0.5* 0.7* 0.3*   EOSABS 0.20 0.40 0.30   BASOSABS 0.00 0.00 0.00      BMP:   Recent Labs     12/17/22  0327 12/18/22  0326 12/19/22  0258    137 137   K 3.8 3.5 3.2*    100 101   CO2 23 24 25   ANIONGAP 12 13 11   GLUCOSE 93 101 133*   CREATININE 0.6 0.6 0.5   LABGLOM >60 >60 >60   CALCIUM 8. 0* 8.2* 8.3*     LFT: No results for input(s): PROT, LABALBU, BILITOT, ALKPHOS, ALT, AST in the last 72 hours. A: Principal Problem:    SBO (small bowel obstruction) (HCC)  Active Problems:    Hyponatremia    COVID-19    Hyperglycemia  Resolved Problems:    * No resolved hospital problems. *      P:   Multimodal analgesia. Hemodynamically stable. Encourage IS usage. Given leukocytosis and emesis, will proceed with CT with IV and oral contrast to r/o abscess or leak. NPO for now. Will start cipro/flagyl as precaution.      Ghassan Show, DO   Electronically Signed on 12/19/2022 at 10:01 AM

## 2022-12-19 NOTE — PROGRESS NOTES
Occupational Therapy     12/19/22 1600   Subjective   Subjective Pt in bed upon arrival with son present. Pt agreeable to participate. Pain Assessment   Pain Assessment None - Denies Pain   Cognition   Overall Cognitive Status WNL   Orientation   Overall Orientation Status WNL   Bed Mobility Training   Bed Mobility Training Yes   Overall Level of Assistance Supervision   Transfer Training   Transfer Training Yes   Overall Level of Assistance Contact-guard assistance  (uses IV pole as support.)   Interventions Verbal cues   Balance   Sitting Intact   Standing With support  (uses IV pole as support this date.)   ADL   Feeding Setup; Independent   Grooming Independent;Setup   UE Bathing Minimal assistance   LE Bathing Minimal assistance   UE Dressing Independent;Setup   LE Dressing Minimal assistance   Toileting Contact guard assistance   Assessment   Assessment Tx focused on functional mobility. Pt able to stand at RW level to urinate in urinal this date. Pt states he is having a hard time with urination due to UTI. Nursing aware.    Activity Tolerance Patient tolerated treatment well   Discharge Recommendations Continue to assess pending progress   Occupational Therapy Plan   Times Per Week 3-5   Times Per Day Once a day   OT Plan of Care   Monday X   Electronically signed by KEYSHA Dash on 12/19/2022 at 4:12 PM

## 2022-12-19 NOTE — PROGRESS NOTES
Comprehensive Nutrition Assessment    Type and Reason for Visit:  Reassess    Nutrition Recommendations/Plan:   Will recommend to start PICC-line placement with TPN. Malnutrition Assessment:  Malnutrition Status: At risk for malnutrition (Comment) (12/19/22 1247)    Context:  Acute Illness     Findings of the 6 clinical characteristics of malnutrition:  Energy Intake:  75% or less of estimated energy requirements for 7 or more days  Weight Loss:  No significant weight loss     Body Fat Loss:  No significant body fat loss     Muscle Mass Loss:  No significant muscle mass loss    Fluid Accumulation:  Mild Extremities   Strength:  Not Performed    Nutrition Assessment:    Following pt for NPO x 7 days. Aware pt is experiencing vomiting and diarrhea. Potassium decreased at 3.2. BG elevated at 133. NG tube was discontinued on 12/18. Pt. has had inadequate intake since 12/11. Will recommend to start PICC-line placement with TPN. Nutrition Related Findings:      Wound Type: Surgical Incision       Current Nutrition Intake & Therapies:    Average Meal Intake: NPO  Average Supplements Intake: NPO  Diet NPO Exceptions are: Sips of Water with Meds    Anthropometric Measures:  Height: 6' 1\" (185.4 cm)  Ideal Body Weight (IBW): 184 lbs (84 kg)    Admission Body Weight: 225 lb (102.1 kg)  Current Body Weight: 225 lb (102.1 kg), 122.3 % IBW. Weight Source: Not Specified  Current BMI (kg/m2): 29.7        Weight Adjustment For: No Adjustment    BMI Categories: Overweight (BMI 25.0-29. 9)    Estimated Daily Nutrient Needs:  Energy Requirements Based On: Kcal/kg  Weight Used for Energy Requirements: Current  Energy (kcal/day): 2042-2552  Weight Used for Protein Requirements: Ideal  Protein (g/day): 109-167  Method Used for Fluid Requirements: 1 ml/kcal  Fluid (ml/day): 9101-1385    Nutrition Diagnosis:   Inadequate oral intake related to acute injury/trauma as evidenced by NPO or clear liquid status due to medical condition    Nutrition Interventions:   Food and/or Nutrient Delivery: Continue NPO  Nutrition Education/Counseling: No recommendation at this time  Coordination of Nutrition Care: Continue to monitor while inpatient       Goals:     Goals: Meet at least 75% of estimated needs       Nutrition Monitoring and Evaluation:   Behavioral-Environmental Outcomes: None Identified  Food/Nutrient Intake Outcomes: Diet Advancement/Tolerance  Physical Signs/Symptoms Outcomes: Biochemical Data, Fluid Status or Edema, Weight, Skin    Discharge Planning:     Too soon to determine     Gisela Rasmussen RD, LD  Contact: [phone]

## 2022-12-20 LAB
ANION GAP SERPL CALCULATED.3IONS-SCNC: 10 MMOL/L (ref 7–19)
BASOPHILS ABSOLUTE: 0 K/UL (ref 0–0.2)
BASOPHILS RELATIVE PERCENT: 0.2 % (ref 0–1)
BUN BLDV-MCNC: 12 MG/DL (ref 8–23)
CALCIUM SERPL-MCNC: 7.8 MG/DL (ref 8.8–10.2)
CHLORIDE BLD-SCNC: 102 MMOL/L (ref 98–111)
CO2: 25 MMOL/L (ref 22–29)
CREAT SERPL-MCNC: 0.6 MG/DL (ref 0.5–1.2)
EOSINOPHILS ABSOLUTE: 0.1 K/UL (ref 0–0.6)
EOSINOPHILS RELATIVE PERCENT: 0.7 % (ref 0–5)
GFR SERPL CREATININE-BSD FRML MDRD: >60 ML/MIN/{1.73_M2}
GLUCOSE BLD-MCNC: 107 MG/DL (ref 70–99)
GLUCOSE BLD-MCNC: 109 MG/DL (ref 70–99)
GLUCOSE BLD-MCNC: 114 MG/DL (ref 70–99)
GLUCOSE BLD-MCNC: 119 MG/DL (ref 70–99)
GLUCOSE BLD-MCNC: 134 MG/DL (ref 74–109)
HCT VFR BLD CALC: 31.4 % (ref 42–52)
HEMOGLOBIN: 11 G/DL (ref 14–18)
IMMATURE GRANULOCYTES #: 0.1 K/UL
LYMPHOCYTES ABSOLUTE: 0.5 K/UL (ref 1.1–4.5)
LYMPHOCYTES RELATIVE PERCENT: 4.6 % (ref 20–40)
MAGNESIUM: 1.5 MG/DL (ref 1.6–2.4)
MCH RBC QN AUTO: 33.4 PG (ref 27–31)
MCHC RBC AUTO-ENTMCNC: 35 G/DL (ref 33–37)
MCV RBC AUTO: 95.4 FL (ref 80–94)
MONOCYTES ABSOLUTE: 0.4 K/UL (ref 0–0.9)
MONOCYTES RELATIVE PERCENT: 3.9 % (ref 0–10)
NEUTROPHILS ABSOLUTE: 10 K/UL (ref 1.5–7.5)
NEUTROPHILS RELATIVE PERCENT: 89.9 % (ref 50–65)
PDW BLD-RTO: 12.4 % (ref 11.5–14.5)
PERFORMED ON: ABNORMAL
PLATELET # BLD: 273 K/UL (ref 130–400)
PMV BLD AUTO: 8.7 FL (ref 9.4–12.4)
POTASSIUM REFLEX MAGNESIUM: 3.4 MMOL/L (ref 3.5–5)
RBC # BLD: 3.29 M/UL (ref 4.7–6.1)
SODIUM BLD-SCNC: 137 MMOL/L (ref 136–145)
WBC # BLD: 11.1 K/UL (ref 4.8–10.8)

## 2022-12-20 PROCEDURE — 83735 ASSAY OF MAGNESIUM: CPT

## 2022-12-20 PROCEDURE — 6360000002 HC RX W HCPCS: Performed by: SURGERY

## 2022-12-20 PROCEDURE — C9113 INJ PANTOPRAZOLE SODIUM, VIA: HCPCS | Performed by: SURGERY

## 2022-12-20 PROCEDURE — 99024 POSTOP FOLLOW-UP VISIT: CPT | Performed by: SURGERY

## 2022-12-20 PROCEDURE — 6370000000 HC RX 637 (ALT 250 FOR IP): Performed by: SURGERY

## 2022-12-20 PROCEDURE — 36415 COLL VENOUS BLD VENIPUNCTURE: CPT

## 2022-12-20 PROCEDURE — 97535 SELF CARE MNGMENT TRAINING: CPT

## 2022-12-20 PROCEDURE — 97116 GAIT TRAINING THERAPY: CPT

## 2022-12-20 PROCEDURE — 80048 BASIC METABOLIC PNL TOTAL CA: CPT

## 2022-12-20 PROCEDURE — 6360000002 HC RX W HCPCS: Performed by: HOSPITALIST

## 2022-12-20 PROCEDURE — 2580000003 HC RX 258: Performed by: SURGERY

## 2022-12-20 PROCEDURE — 6370000000 HC RX 637 (ALT 250 FOR IP): Performed by: HOSPITALIST

## 2022-12-20 PROCEDURE — 6370000000 HC RX 637 (ALT 250 FOR IP): Performed by: NURSE PRACTITIONER

## 2022-12-20 PROCEDURE — 1210000000 HC MED SURG R&B

## 2022-12-20 PROCEDURE — 85025 COMPLETE CBC W/AUTO DIFF WBC: CPT

## 2022-12-20 PROCEDURE — 2500000003 HC RX 250 WO HCPCS: Performed by: SURGERY

## 2022-12-20 PROCEDURE — 82947 ASSAY GLUCOSE BLOOD QUANT: CPT

## 2022-12-20 RX ORDER — SIMETHICONE 80 MG
80 TABLET,CHEWABLE ORAL EVERY 6 HOURS PRN
Status: DISCONTINUED | OUTPATIENT
Start: 2022-12-20 | End: 2022-12-22 | Stop reason: HOSPADM

## 2022-12-20 RX ORDER — BACITRACIN, NEOMYCIN, POLYMYXIN B 400; 3.5; 5 [USP'U]/G; MG/G; [USP'U]/G
OINTMENT TOPICAL 2 TIMES DAILY
Status: DISCONTINUED | OUTPATIENT
Start: 2022-12-20 | End: 2022-12-22 | Stop reason: HOSPADM

## 2022-12-20 RX ORDER — CLOTRIMAZOLE AND BETAMETHASONE DIPROPIONATE 10; .64 MG/G; MG/G
CREAM TOPICAL 2 TIMES DAILY
Status: DISCONTINUED | OUTPATIENT
Start: 2022-12-20 | End: 2022-12-22 | Stop reason: HOSPADM

## 2022-12-20 RX ORDER — MAGNESIUM SULFATE IN WATER 40 MG/ML
2000 INJECTION, SOLUTION INTRAVENOUS ONCE
Status: COMPLETED | OUTPATIENT
Start: 2022-12-20 | End: 2022-12-20

## 2022-12-20 RX ORDER — CIPROFLOXACIN 2 MG/ML
200 INJECTION, SOLUTION INTRAVENOUS EVERY 12 HOURS
Status: DISCONTINUED | OUTPATIENT
Start: 2022-12-20 | End: 2022-12-21

## 2022-12-20 RX ADMIN — BACITRACIN ZINC NEOMYCIN SULFATE POLYMYXIN B SULFATE: 400; 3.5; 5 OINTMENT TOPICAL at 21:34

## 2022-12-20 RX ADMIN — CIPROFLOXACIN 400 MG: 2 INJECTION, SOLUTION INTRAVENOUS at 00:03

## 2022-12-20 RX ADMIN — MORPHINE SULFATE 4 MG: 4 INJECTION, SOLUTION INTRAMUSCULAR; INTRAVENOUS at 19:46

## 2022-12-20 RX ADMIN — METRONIDAZOLE 500 MG: 500 INJECTION, SOLUTION INTRAVENOUS at 12:50

## 2022-12-20 RX ADMIN — MAGNESIUM SULFATE HEPTAHYDRATE 2000 MG: 40 INJECTION, SOLUTION INTRAVENOUS at 15:18

## 2022-12-20 RX ADMIN — MORPHINE SULFATE 4 MG: 4 INJECTION, SOLUTION INTRAMUSCULAR; INTRAVENOUS at 04:29

## 2022-12-20 RX ADMIN — SODIUM CHLORIDE, PRESERVATIVE FREE 40 MG: 5 INJECTION INTRAVENOUS at 08:41

## 2022-12-20 RX ADMIN — CIPROFLOXACIN 400 MG: 2 INJECTION, SOLUTION INTRAVENOUS at 12:50

## 2022-12-20 RX ADMIN — POTASSIUM CHLORIDE 40 MEQ: 1500 TABLET, EXTENDED RELEASE ORAL at 08:41

## 2022-12-20 RX ADMIN — SIMETHICONE 80 MG: 80 TABLET, CHEWABLE ORAL at 14:09

## 2022-12-20 RX ADMIN — FLUTICASONE PROPIONATE 1 SPRAY: 50 SPRAY, METERED NASAL at 15:25

## 2022-12-20 RX ADMIN — SODIUM CHLORIDE, PRESERVATIVE FREE 10 ML: 5 INJECTION INTRAVENOUS at 19:56

## 2022-12-20 RX ADMIN — TAMSULOSIN HYDROCHLORIDE 0.4 MG: 0.4 CAPSULE ORAL at 08:41

## 2022-12-20 RX ADMIN — POTASSIUM CHLORIDE 40 MEQ: 1500 TABLET, EXTENDED RELEASE ORAL at 05:45

## 2022-12-20 RX ADMIN — METRONIDAZOLE 500 MG: 500 INJECTION, SOLUTION INTRAVENOUS at 05:31

## 2022-12-20 RX ADMIN — MORPHINE SULFATE 4 MG: 4 INJECTION, SOLUTION INTRAMUSCULAR; INTRAVENOUS at 08:41

## 2022-12-20 RX ADMIN — MORPHINE SULFATE 4 MG: 4 INJECTION, SOLUTION INTRAMUSCULAR; INTRAVENOUS at 14:09

## 2022-12-20 RX ADMIN — ENOXAPARIN SODIUM 30 MG: 100 INJECTION SUBCUTANEOUS at 08:42

## 2022-12-20 RX ADMIN — CIPROFLOXACIN 200 MG: 2 INJECTION, SOLUTION INTRAVENOUS at 21:27

## 2022-12-20 RX ADMIN — CLOTRIMAZOLE AND BETAMETHASONE DIPROPIONATE: 10; .5 CREAM TOPICAL at 21:33

## 2022-12-20 RX ADMIN — ENOXAPARIN SODIUM 30 MG: 100 INJECTION SUBCUTANEOUS at 19:57

## 2022-12-20 RX ADMIN — METRONIDAZOLE 500 MG: 500 INJECTION, SOLUTION INTRAVENOUS at 19:54

## 2022-12-20 ASSESSMENT — PAIN SCALES - GENERAL
PAINLEVEL_OUTOF10: 7
PAINLEVEL_OUTOF10: 10
PAINLEVEL_OUTOF10: 7
PAINLEVEL_OUTOF10: 3

## 2022-12-20 ASSESSMENT — PAIN DESCRIPTION - DESCRIPTORS
DESCRIPTORS: DISCOMFORT;ACHING
DESCRIPTORS: ACHING;SORE
DESCRIPTORS: SHARP

## 2022-12-20 ASSESSMENT — PAIN DESCRIPTION - FREQUENCY: FREQUENCY: INTERMITTENT

## 2022-12-20 ASSESSMENT — PAIN DESCRIPTION - ORIENTATION
ORIENTATION: MID
ORIENTATION: UPPER

## 2022-12-20 ASSESSMENT — PAIN DESCRIPTION - LOCATION
LOCATION: ABDOMEN

## 2022-12-20 ASSESSMENT — PAIN - FUNCTIONAL ASSESSMENT: PAIN_FUNCTIONAL_ASSESSMENT: ACTIVITIES ARE NOT PREVENTED

## 2022-12-20 NOTE — PROGRESS NOTES
[facility] Surgery Progress Note    Chief Complaint:  Chief Complaint   Patient presents with    Abdominal Pain     Pt reports abdominal pain. Emesis     Pt reports vomiting. Pt reports diarrhea this morning. Pt states that he has not had a normal BM in a week. POD # 6    S: Mr. Zandra Brown is seen and examined at bedside. He continues to have flatus and bowel movements. He has incisional pain. Reports of low saturations overnight with replacement of nasal cannula oxygen. O:   BP (!) 146/85   Pulse 78   Temp 98.8 °F (37.1 °C) (Temporal)   Resp 19   Ht 6' 1\" (1.854 m)   Wt 225 lb (102.1 kg)   SpO2 98%   BMI 29.69 kg/m²   I/O reviewed and appropriate  CONSTITUTIONAL: Alert, appropriate, no acute distress  SKIN: warm, dry with no rashes or lesions  HEENT: NCAT, Non icteric, PERR. Trachea Midline. CHEST/LUNGS: CTA bilaterally. Normal respiratory effort. CARDIOVASCULAR: RRR, No edema. ABDOMEN: soft, ND, appropriately TTP, +BS. Abd binder in place. Incisions: Clean, dry, and intact with staples; no erythema or induration  NEUROLOGIC: CN II-XI grossly intact, no motor or sensory deficits   MUSCULOSKELETAL: No clubbing or cyanosis. PSYCHIATRIC:  Normal Mood and Affect. Alert and Oriented.     LABS:   CBC:   Recent Labs     12/18/22  0326 12/19/22 0258 12/20/22  0159   WBC 11.1* 14.0* 11.1*   RBC 3.47* 3.50* 3.29*   HGB 11.5* 11.5* 11.0*   HCT 34.9* 34.0* 31.4*    276 273   LYMPHOPCT 6.4* 2.1* 4.6*   MONOPCT 6.0 4.6 3.9   BASOPCT 0.2 0.3 0.2   MONOSABS 0.70 0.70 0.40   LYMPHSABS 0.7* 0.3* 0.5*   EOSABS 0.40 0.30 0.10   BASOSABS 0.00 0.00 0.00      BMP:   Recent Labs     12/18/22  0326 12/19/22  0258 12/20/22  0159    137 137   K 3.5 3.2* 3.4*    101 102   CO2 24 25 25   ANIONGAP 13 11 10   GLUCOSE 101 133* 134*   CREATININE 0.6 0.5 0.6   LABGLOM >60 >60 >60   CALCIUM 8.2* 8.3* 7.8*       A: Principal Problem:    SBO (small bowel obstruction) (HCC)  Active Problems: Hyponatremia    COVID-19    Hyperglycemia  Resolved Problems:    * No resolved hospital problems. *      P:   Multimodal analgesia. Hemodynamically stable. Encourage IS usage. D/C IVF. Advance to full liquid diet with ensure. Requested reads on imaging from yesterday from Director of Radiology, still yet to be done. Upon personal review, contrast makes it into the colon without evidence of extravasation. Some postoperative fluid in the abdomen, but no rim enhancement to suggest abscess. CXR suggestive of possible fluid overload.     Divya Dubose DO   Electronically Signed on 12/20/2022 at 10:51 AM

## 2022-12-20 NOTE — PROGRESS NOTES
Occupational Therapy     12/20/22 1409   Subjective   Subjective Pt in bed upon arrival for therapy. pt wanting to take a shower this date. Pain Assessment   Pain Level 7   Patient's Stated Pain Goal 0 - No pain   Pain Location Abdomen   Pain Orientation Upper   Pain Descriptors Discomfort;Aching   Functional Pain Assessment Activities are not prevented   Pain Frequency Intermittent   Non-Pharmaceutical Pain Intervention(s) Ambulation/Increased Activity;Repositioned;Rest;Nurse notified (comment)  (Some drainage/ bleeding when undressing for shower; nursing made aware. (Zomi))   Response to Pain Intervention Patient satisfied   Vitals   O2 Device Nasal cannula  (Simultaneous filing. User may not have seen previous data.)   Cognition   Overall Cognitive Status WNL   Orientation   Overall Orientation Status WNL   Bed Mobility Training   Bed Mobility Training Yes   Overall Level of Assistance Minimum assistance;Stand-by assistance   Interventions Verbal cues; Tactile cues   Supine to Sit Minimum assistance;Stand-by assistance   Scooting Stand-by assistance   Transfer Training   Transfer Training Yes  (Simultaneous filing. User may not have seen previous data.)   Overall Level of Assistance Contact-guard assistance   Interventions Verbal cues; Tactile cues   Sit to Stand Contact-guard assistance  (Simultaneous filing. User may not have seen previous data.)   Stand to Sit Contact-guard assistance  (Simultaneous filing. User may not have seen previous data.)   Bed to Chair Contact-guard assistance   Toilet Transfer Contact-guard assistance   Balance   Sitting Intact   Standing With support   Gait   Overall Level of Assistance Stand-by assistance   Distance (ft) 250 Feet  (Gait on 2L  Sats in high 90's post gait Nikole MAXWELL. turned o2 down 1L)   Assistive Device Walker, rolling   ADL   Feeding Setup; Independent   Grooming Stand by assistance  (standing at sink)   UE Bathing Stand by assistance   LE Bathing Minimal assistance;Contact guard assistance   UE Dressing Stand by assistance   LE Dressing Minimal assistance;Contact guard assistance   Toileting Contact guard assistance   Additional Comments Pt limited by endurance. Has difficulty bending foward to sock and shoe management due to surgical sight. Pt able to  shower and at sink for h/g tasks. Assessment   Assessment T focused on toileting, showering, h/g tasks at sink in standing and return to recliner to sit up for lunch this date.    Activity Tolerance Patient tolerated treatment well   Discharge Recommendations Patient would benefit from continued therapy after discharge;Continue to assess pending progress   Occupational Therapy Plan   Times Per Week 3-5   Times Per Day Once a day   OT Plan of Care   Tuesday X   Electronically signed by KEYSHA Galindo on 12/20/2022 at 3:03 PM

## 2022-12-20 NOTE — PROGRESS NOTES
12/20/22 1409   Subjective   Subjective Patient up in chair agrees to walk. (Had to place abdominal binder on had been removed for shower.)   Pain Assessment   Pain Level 7   Patient's Stated Pain Goal 0 - No pain   Pain Location Abdomen   Pain Orientation Upper   Vitals   O2 Device Nasal cannula   Transfer Training   Transfer Training Yes   Sit to Stand Stand-by assistance   Stand to Sit Stand-by assistance   Gait Training   Gait Training Yes   Gait   Overall Level of Assistance Stand-by assistance   Distance (ft) 250 Feet  (Gait on 2L  Sats in high 90's post gait Nikole MAXWELL. turned o2 down 1L)   Assistive Device Walker, rolling   Other Specialty Interventions   Other Treatments/Modalities Patient in chair all needs in reach.    PT Plan of Care   Tuesday X         Electronically signed by Flynn Fall PTA on 12/20/2022 at 2:59 PM

## 2022-12-20 NOTE — PROGRESS NOTES
Daily Progress Note    Date:2022  Patient: Marquis Lopez  : 1945  TGP:[id number]  CODE:Full Code No additional code details  PCP:CHERYL Castelan NP    Admit Date: 2022  2:08 AM   LOS: 8 days   Subjective:,  Patient reports feeling worse today. Complains abdomen feeling tight. General surgery has seen and he CT penDING  Summary:    The patient is a 75 yo male with a PMH of pacer, BPH, HTN, HLD, GIST with gastrectomy who presented to [facility] ED on 2022 with c/o epigastric pain, N/V, and diarrhea for ~1 week that was worsening in nature. He additionally reported a URI that had been improving until the day prior to admission. Further ED work-up revealed that the pt was + for Covid 19, CT of the abdomen and pelvis showed SBO with transition point suspected to be within the hemiabdomen. Upstream bowel loops measured up to 4.5cm. Labs revealed hyponatremia with an Na-128 and Cl-91. The patient was admitted to hospital medicine due to SBO and hyponatremia with a general surgery consult. An NGT was placed to [facility] and general surgery plans to treat conservatively upon admission. NGT remains at LIS and the patient remains strict NPO. He ultimately had failure of conserative treatment with NGT and bowel decompression and is  S/P ex lap with omentectomy and lysis of adhesions on 2022 per Dr. Eulalia Henry. NGT removed and advanced to clear liquid diet today. IVF's continue with electrolytes being relatively [id number], Cl-101. Mild leukocytosis has resolved with WBC-13.2-felt to be reactionary. H&H-11.5/34.9-mild anemia felt to be r/t surgical intervention on 2022, platelets-266. Patient complained of increased pain and tightness to the abdomen, neurosurgery evaluated and CT of the abdomen with contrast oral obtained today to concern for possible leak or abscess with a bump in his white count. Reviewed by general surgery and found that all the contrast stayed in intestine. Lab Review   Recent Results (from the past 24 hour(s))   CBC with Auto Differential    Collection Time: 12/19/22  2:58 AM   Result Value Ref Range    WBC 14.0 (H) 4.8 - 10.8 K/uL    RBC 3.50 (L) 4.70 - 6.10 M/uL    Hemoglobin 11.5 (L) 14.0 - 18.0 g/dL    Hematocrit 34.0 (L) 42.0 - 52.0 %    MCV 97.1 (H) 80.0 - 94.0 fL    MCH 32.9 (H) 27.0 - 31.0 pg    MCHC 33.8 33.0 - 37.0 g/dL    RDW 12.3 11.5 - 14.5 %    Platelets 644 797 - 052 K/uL    MPV 8.9 (L) 9.4 - 12.4 fL    Neutrophils % 90.3 (H) 50.0 - 65.0 %    Lymphocytes % 2.1 (L) 20.0 - 40.0 %    Monocytes % 4.6 0.0 - 10.0 %    Eosinophils % 2.0 0.0 - 5.0 %    Basophils % 0.3 0.0 - 1.0 %    Neutrophils Absolute 12.7 (H) 1.5 - 7.5 K/uL    Immature Granulocytes # 0.1 K/uL    Lymphocytes Absolute 0.3 (L) 1.1 - 4.5 K/uL    Monocytes Absolute 0.70 0.00 - 0.90 K/uL    Eosinophils Absolute 0.30 0.00 - 0.60 K/uL    Basophils Absolute 0.00 0.00 - 0.20 K/uL   Basic Metabolic Panel w/ Reflex to MG    Collection Time: 12/19/22  2:58 AM   Result Value Ref Range    Sodium 137 136 - 145 mmol/L    Potassium reflex Magnesium 3.2 (L) 3.5 - 5.0 mmol/L    Chloride 101 98 - 111 mmol/L    CO2 25 22 - 29 mmol/L    Anion Gap 11 7 - 19 mmol/L    Glucose 133 (H) 74 - 109 mg/dL    BUN 12 8 - 23 mg/dL    Creatinine 0.5 0.5 - 1.2 mg/dL    Est, Glom Filt Rate >60 >60    Calcium 8.3 (L) 8.8 - 10.2 mg/dL   Magnesium    Collection Time: 12/19/22  2:58 AM   Result Value Ref Range    Magnesium 1.8 1.6 - 2.4 mg/dL   Urinalysis with Reflex to Culture    Collection Time: 12/19/22  2:30 PM    Specimen: Urine   Result Value Ref Range    Color, UA YELLOW Straw/Yellow    Clarity, UA Clear Clear    Glucose, Ur Negative Negative mg/dL    Bilirubin Urine Negative Negative    Ketones, Urine 15 (A) Negative mg/dL    Specific Gravity, UA >=1.045 1.005 - 1.030    Blood, Urine TRACE (A) Negative    pH, UA 6.0 5.0 - 8.0    Protein, UA 30 (A) Negative mg/dL    Urobilinogen, Urine 1.0 <2.0 E.U./dL    Nitrite, Urine Negative Negative    Leukocyte Esterase, Urine Negative Negative   Microscopic Urinalysis    Collection Time: 12/19/22  2:30 PM   Result Value Ref Range    Bacteria, UA NEGATIVE (A) None Seen /HPF    Crystals, UA NEG (A) None Seen /HPF    Hyaline Casts, UA 3 0 - 8 /HPF    WBC, UA 2 0 - 5 /HPF    RBC, UA 4 0 - 4 /HPF    Epithelial Cells, UA 2 0 - 5 /HPF       I/O last 3 completed shifts: In: 1210 [P.O.:400;  I.V.:4145]  Out: 975 [Urine:950; Emesis/NG output:25]  I/O this shift:  In: 240 [P.O.:240]  Out: -       Current Facility-Administered Medications:     metronidazole (FLAGYL) 500 mg in 0.9% NaCl 100 mL IVPB premix, 500 mg, IntraVENous, K8G, Katina Nam DO, Stopped at 12/19/22 1448    ciprofloxacin (CIPRO) IVPB 400 mg, 400 mg, IntraVENous, N92N, Katina Nam DO, Stopped at 12/19/22 1449    potassium chloride (KLOR-CON M) extended release tablet 40 mEq, 40 mEq, Oral, PRN, 40 mEq at 12/19/22 1347 **OR** potassium bicarb-citric acid (EFFER-K) effervescent tablet 40 mEq, 40 mEq, Oral, PRN **OR** potassium chloride 10 mEq/100 mL IVPB (Peripheral Line), 10 mEq, IntraVENous, PRN, CHERYL Blas - CNP    insulin lispro (HUMALOG) injection vial 0-4 Units, 0-4 Units, SubCUTAneous, TID WC, Samanta Manna, APRN    insulin lispro (HUMALOG) injection vial 0-4 Units, 0-4 Units, SubCUTAneous, Nightly, Samanta Manna, APRN    glucose chewable tablet 16 g, 4 tablet, Oral, PRN, Samanta Manna, APRN    dextrose bolus 10% 125 mL, 125 mL, IntraVENous, PRN **OR** dextrose bolus 10% 250 mL, 250 mL, IntraVENous, PRN, Samanta Manna, APRN    glucagon (rDNA) injection 1 mg, 1 mg, SubCUTAneous, PRN, Samanta Manna, APRN    dextrose 10 % infusion, , IntraVENous, Continuous PRN, Samanta Manna, APRN    ondansetron (ZOFRAN-ODT) disintegrating tablet 4 mg, 4 mg, Oral, Q8H PRN **OR** ondansetron (ZOFRAN) injection 4 mg, 4 mg, IntraVENous, J7T PRN, Katina Nam DO, 4 mg at 12/18/22 2042    lactated ringers infusion, , IntraVENous, Continuous, Carlyle Valdez, APRN, Last Rate: 75 mL/hr at 12/18/22 1056, New Bag at 12/18/22 1056    morphine (PF) injection 2 mg, 2 mg, IntraVENous, Q2H PRN, 2 mg at 12/19/22 1345 **OR** morphine injection 4 mg, 4 mg, IntraVENous, B0E PRN, Katina Nam DO, 4 mg at 12/19/22 1709    pantoprazole (PROTONIX) 40 mg in sodium chloride (PF) 0.9 % 10 mL injection, 40 mg, IntraVENous, Daily, Katina Nam DO, 40 mg at 12/19/22 0850    fluticasone (FLONASE) 50 MCG/ACT nasal spray 1 spray, 1 spray, Each Nostril, Daily, Katina Nam DO, 1 spray at 12/19/22 0851    sodium chloride flush 0.9 % injection 5-40 mL, 5-40 mL, IntraVENous, 2 times per day, Katina Nam DO, 10 mL at 12/19/22 0851    0.9 % sodium chloride infusion, , IntraVENous, PRN, Katina Nam, DO    enoxaparin Sodium (LOVENOX) injection 30 mg, 30 mg, SubCUTAneous, BID, Katina Nam DO, 30 mg at 12/19/22 0850    aspirin EC tablet 81 mg, 81 mg, Oral, Once per day on Mon Wed Fri, Katina Nam DO, 81 mg at 12/19/22 0850    tamsulosin (FLOMAX) capsule 0.4 mg, 0.4 mg, Oral, Daily, Katina Nam DO, 0.4 mg at 12/19/22 4002      Physical Exam  Vitals and nursing note reviewed. Constitutional:       Appearance: He is obese. He is ill-appearing. HENT:      Head: Normocephalic. Mouth/Throat:      Mouth: Mucous membranes are moist.   Eyes:      Extraocular Movements: Extraocular movements intact. Cardiovascular:      Rate and Rhythm: Normal rate and regular rhythm. Pulses: Normal pulses. Heart sounds: Normal heart sounds. Pulmonary:      Breath sounds: Normal breath sounds. Abdominal:      General: There is distension. Tenderness: There is abdominal tenderness. There is guarding. Musculoskeletal:         General: Normal range of motion. Cervical back: Neck supple. Skin:     General: Skin is warm and dry. Coloration: Skin is pale. Neurological:      General: No focal deficit present. Mental Status: He is alert. Psychiatric:         Mood and Affect: Mood normal.           Assessment & Plan     Principal Problem:    SBO (small bowel obstruction) (HCC)              -POD #4 ex lap lysis of adhesion and omentectomy -per Dr. Ronita Runner surgery following              -Pain management              -Continue IVF's              -NGT removed              -clear liquid diet              -Daily labs               -Strict I&O              -Monitor vitals               -VTE prophylaxis              -Pain and nausea management              -Supportive care   -CT of the abdomen with oral contrast     Active Problems:  Covid-19              -remains on RA              -Droplet precautions              -Monitor              -Relatively asymptomatic     Hyperglycemia              -Continue low dose correctional protocol              -Hypoglycemic protocol              -Accuchecks     Hypertension              -Will monitor at this time-felt to be r/t pain         DVT prophylaxis: Lovenox    Full Code No additional code details    DISPOSITION:    D/C: Skilled Facility  Estimated D/C Date:  To be determined          CHERYL Sow CNP 12/19/2022 6:19 PM

## 2022-12-21 LAB
ANION GAP SERPL CALCULATED.3IONS-SCNC: 11 MMOL/L (ref 7–19)
BASOPHILS ABSOLUTE: 0 K/UL (ref 0–0.2)
BASOPHILS RELATIVE PERCENT: 0.3 % (ref 0–1)
BUN BLDV-MCNC: 14 MG/DL (ref 8–23)
CALCIUM SERPL-MCNC: 8 MG/DL (ref 8.8–10.2)
CHLORIDE BLD-SCNC: 99 MMOL/L (ref 98–111)
CO2: 26 MMOL/L (ref 22–29)
CREAT SERPL-MCNC: 0.6 MG/DL (ref 0.5–1.2)
EOSINOPHILS ABSOLUTE: 0.3 K/UL (ref 0–0.6)
EOSINOPHILS RELATIVE PERCENT: 2.2 % (ref 0–5)
GFR SERPL CREATININE-BSD FRML MDRD: >60 ML/MIN/{1.73_M2}
GLUCOSE BLD-MCNC: 103 MG/DL (ref 70–99)
GLUCOSE BLD-MCNC: 111 MG/DL (ref 74–109)
GLUCOSE BLD-MCNC: 113 MG/DL (ref 70–99)
GLUCOSE BLD-MCNC: 91 MG/DL (ref 70–99)
GLUCOSE BLD-MCNC: 96 MG/DL (ref 70–99)
HCT VFR BLD CALC: 32.3 % (ref 42–52)
HEMOGLOBIN: 10.9 G/DL (ref 14–18)
IMMATURE GRANULOCYTES #: 0.2 K/UL
LYMPHOCYTES ABSOLUTE: 0.7 K/UL (ref 1.1–4.5)
LYMPHOCYTES RELATIVE PERCENT: 6.1 % (ref 20–40)
MCH RBC QN AUTO: 33 PG (ref 27–31)
MCHC RBC AUTO-ENTMCNC: 33.7 G/DL (ref 33–37)
MCV RBC AUTO: 97.9 FL (ref 80–94)
MONOCYTES ABSOLUTE: 0.5 K/UL (ref 0–0.9)
MONOCYTES RELATIVE PERCENT: 4.3 % (ref 0–10)
NEUTROPHILS ABSOLUTE: 9.8 K/UL (ref 1.5–7.5)
NEUTROPHILS RELATIVE PERCENT: 85.7 % (ref 50–65)
PDW BLD-RTO: 12.6 % (ref 11.5–14.5)
PERFORMED ON: ABNORMAL
PERFORMED ON: ABNORMAL
PERFORMED ON: NORMAL
PERFORMED ON: NORMAL
PLATELET # BLD: 255 K/UL (ref 130–400)
PMV BLD AUTO: 8.4 FL (ref 9.4–12.4)
POTASSIUM REFLEX MAGNESIUM: 4.2 MMOL/L (ref 3.5–5)
RBC # BLD: 3.3 M/UL (ref 4.7–6.1)
SODIUM BLD-SCNC: 136 MMOL/L (ref 136–145)
WBC # BLD: 11.4 K/UL (ref 4.8–10.8)

## 2022-12-21 PROCEDURE — 1210000000 HC MED SURG R&B

## 2022-12-21 PROCEDURE — 6370000000 HC RX 637 (ALT 250 FOR IP): Performed by: SURGERY

## 2022-12-21 PROCEDURE — C9113 INJ PANTOPRAZOLE SODIUM, VIA: HCPCS | Performed by: SURGERY

## 2022-12-21 PROCEDURE — 6360000002 HC RX W HCPCS: Performed by: SURGERY

## 2022-12-21 PROCEDURE — 97530 THERAPEUTIC ACTIVITIES: CPT

## 2022-12-21 PROCEDURE — 2500000003 HC RX 250 WO HCPCS: Performed by: SURGERY

## 2022-12-21 PROCEDURE — 82947 ASSAY GLUCOSE BLOOD QUANT: CPT

## 2022-12-21 PROCEDURE — 80048 BASIC METABOLIC PNL TOTAL CA: CPT

## 2022-12-21 PROCEDURE — 85025 COMPLETE CBC W/AUTO DIFF WBC: CPT

## 2022-12-21 PROCEDURE — 2580000003 HC RX 258: Performed by: SURGERY

## 2022-12-21 PROCEDURE — 99024 POSTOP FOLLOW-UP VISIT: CPT | Performed by: SURGERY

## 2022-12-21 PROCEDURE — 97535 SELF CARE MNGMENT TRAINING: CPT

## 2022-12-21 PROCEDURE — 36415 COLL VENOUS BLD VENIPUNCTURE: CPT

## 2022-12-21 PROCEDURE — 97116 GAIT TRAINING THERAPY: CPT

## 2022-12-21 RX ADMIN — MORPHINE SULFATE 4 MG: 4 INJECTION, SOLUTION INTRAMUSCULAR; INTRAVENOUS at 03:29

## 2022-12-21 RX ADMIN — BACITRACIN ZINC NEOMYCIN SULFATE POLYMYXIN B SULFATE: 400; 3.5; 5 OINTMENT TOPICAL at 20:32

## 2022-12-21 RX ADMIN — SODIUM CHLORIDE, PRESERVATIVE FREE 40 MG: 5 INJECTION INTRAVENOUS at 09:44

## 2022-12-21 RX ADMIN — BACITRACIN ZINC NEOMYCIN SULFATE POLYMYXIN B SULFATE: 400; 3.5; 5 OINTMENT TOPICAL at 09:51

## 2022-12-21 RX ADMIN — ONDANSETRON 4 MG: 2 INJECTION INTRAMUSCULAR; INTRAVENOUS at 03:29

## 2022-12-21 RX ADMIN — METRONIDAZOLE 500 MG: 500 INJECTION, SOLUTION INTRAVENOUS at 03:25

## 2022-12-21 RX ADMIN — METRONIDAZOLE 500 MG: 500 INJECTION, SOLUTION INTRAVENOUS at 20:24

## 2022-12-21 RX ADMIN — ENOXAPARIN SODIUM 30 MG: 100 INJECTION SUBCUTANEOUS at 20:26

## 2022-12-21 RX ADMIN — CLOTRIMAZOLE AND BETAMETHASONE DIPROPIONATE: 10; .5 CREAM TOPICAL at 20:32

## 2022-12-21 RX ADMIN — ASPIRIN 81 MG: 81 TABLET, COATED ORAL at 09:44

## 2022-12-21 RX ADMIN — ONDANSETRON 4 MG: 2 INJECTION INTRAMUSCULAR; INTRAVENOUS at 20:27

## 2022-12-21 RX ADMIN — METRONIDAZOLE 500 MG: 500 INJECTION, SOLUTION INTRAVENOUS at 11:18

## 2022-12-21 RX ADMIN — TAMSULOSIN HYDROCHLORIDE 0.4 MG: 0.4 CAPSULE ORAL at 09:44

## 2022-12-21 RX ADMIN — ENOXAPARIN SODIUM 30 MG: 100 INJECTION SUBCUTANEOUS at 09:30

## 2022-12-21 RX ADMIN — CLOTRIMAZOLE AND BETAMETHASONE DIPROPIONATE: 10; .5 CREAM TOPICAL at 09:51

## 2022-12-21 RX ADMIN — FLUTICASONE PROPIONATE 1 SPRAY: 50 SPRAY, METERED NASAL at 09:51

## 2022-12-21 RX ADMIN — SODIUM CHLORIDE, PRESERVATIVE FREE 10 ML: 5 INJECTION INTRAVENOUS at 20:28

## 2022-12-21 RX ADMIN — SODIUM CHLORIDE, PRESERVATIVE FREE 10 ML: 5 INJECTION INTRAVENOUS at 09:50

## 2022-12-21 RX ADMIN — MORPHINE SULFATE 2 MG: 2 INJECTION, SOLUTION INTRAMUSCULAR; INTRAVENOUS at 20:27

## 2022-12-21 ASSESSMENT — PAIN SCALES - GENERAL
PAINLEVEL_OUTOF10: 7
PAINLEVEL_OUTOF10: 7

## 2022-12-21 ASSESSMENT — PAIN DESCRIPTION - ORIENTATION
ORIENTATION: MID
ORIENTATION: MID

## 2022-12-21 ASSESSMENT — PAIN DESCRIPTION - DESCRIPTORS
DESCRIPTORS: PRESSURE;DISCOMFORT
DESCRIPTORS: DISCOMFORT

## 2022-12-21 ASSESSMENT — PAIN DESCRIPTION - LOCATION
LOCATION: ABDOMEN
LOCATION: ABDOMEN

## 2022-12-21 ASSESSMENT — PAIN - FUNCTIONAL ASSESSMENT: PAIN_FUNCTIONAL_ASSESSMENT: ACTIVITIES ARE NOT PREVENTED

## 2022-12-21 NOTE — PLAN OF CARE
Problem: ABCDS Injury Assessment  Goal: Absence of physical injury  12/21/2022 1130 by Suhail Anaya RN  Outcome: Progressing  12/20/2022 2248 by Eric Alejo RN  Outcome: Progressing     Problem: Pain  Goal: Verbalizes/displays adequate comfort level or baseline comfort level  12/21/2022 1130 by Suhail Anaya RN  Outcome: Progressing  12/20/2022 2248 by Eric Alejo RN  Outcome: Progressing     Problem: Gastrointestinal - Adult  Goal: Minimal or absence of nausea and vomiting  12/21/2022 1130 by Suhail Anaya RN  Outcome: Progressing  12/20/2022 2248 by Eric Alejo RN  Outcome: Progressing  Goal: Maintains or returns to baseline bowel function  12/21/2022 1130 by Suhail Anaya RN  Outcome: Progressing  12/20/2022 2248 by Eric Alejo RN  Outcome: Progressing  Goal: Maintains adequate nutritional intake  12/21/2022 1130 by Suhail Anaya RN  Outcome: Progressing  12/20/2022 2248 by Eric Alejo RN  Outcome: Progressing  Goal: Establish and maintain optimal ostomy function  12/21/2022 1130 by Suhail Anaya RN  Outcome: Progressing  12/20/2022 2248 by Eric Alejo RN  Outcome: Progressing     Problem: Infection - Adult  Goal: Absence of infection at discharge  12/21/2022 1130 by Suhail Anaya RN  Outcome: Progressing  12/20/2022 2248 by Eric Alejo RN  Outcome: Progressing  Goal: Absence of infection during hospitalization  12/21/2022 1130 by Suhail Anaya RN  Outcome: Progressing  12/20/2022 2248 by Eric Alejo RN  Outcome: Progressing  Goal: Absence of fever/infection during anticipated neutropenic period  12/21/2022 1130 by Suhail Anaya RN  Outcome: Progressing  12/20/2022 2248 by Eric Alejo RN  Outcome: Progressing     Problem: Discharge Planning  Goal: Discharge to home or other facility with appropriate resources  12/21/2022 1130 by Suhail Anaya RN  Outcome: Progressing  12/20/2022 2248 by Eric Alejo RN  Outcome: Progressing     Problem: Safety - Adult  Goal: Free from fall injury  12/21/2022 1130 by Zoila Nowak RN  Outcome: Progressing  12/20/2022 2248 by Diana Vaughn RN  Outcome: Progressing     Problem: Nutrition Deficit:  Goal: Optimize nutritional status  12/21/2022 1130 by Zoila Nowak RN  Outcome: Progressing  12/20/2022 2248 by Diana Vaughn RN  Outcome: Progressing

## 2022-12-21 NOTE — PROGRESS NOTES
Pt has been utilizing incentive spirometer every 15 minutes under family supervision.    O2 weaning was attempted, spot check on room air 81% at rest, 2L applied

## 2022-12-21 NOTE — PROGRESS NOTES
Comprehensive Nutrition Assessment    Type and Reason for Visit:  Reassess    Nutrition Recommendations/Plan:   Will continue to follow daily to assess if an alternate source of nutrition is appropriate. Malnutrition Assessment:  Malnutrition Status: At risk for malnutrition (Comment) (12/21/22 1207)    Context:  Acute Illness     Findings of the 6 clinical characteristics of malnutrition:  Energy Intake:  75% or less of estimated energy requirements for 7 or more days  Weight Loss:  No significant weight loss     Body Fat Loss:  No significant body fat loss     Muscle Mass Loss:  No significant muscle mass loss    Fluid Accumulation:  No significant fluid accumulation Extremities   Strength:  Not Performed    Nutrition Assessment:    Following for 0% PO intake x7 days d/t NPO status. Electrolytes WNL. No significant weight change. Aware pt. diet was upgraded this morning 12/21/22. 0% PO intake noted thus far. Will continue to follow daily to assess if an alternate source of nutrition is appropriate. Nutrition Related Findings:      Wound Type: Surgical Incision       Current Nutrition Intake & Therapies:    Average Meal Intake: 0%  Average Supplements Intake: Unable to assess  ADULT ORAL NUTRITION SUPPLEMENT; Breakfast, Lunch, Dinner; Standard High Calorie/High Protein Oral Supplement  ADULT DIET; Dysphagia - Soft and Bite Sized; Safety Tray; Safety Tray (Disposables)    Anthropometric Measures:  Height: 6' 1\" (185.4 cm)  Ideal Body Weight (IBW): 184 lbs (84 kg)    Admission Body Weight: 225 lb (102.1 kg)  Current Body Weight: 225 lb (102.1 kg), 122.3 % IBW. Weight Source: Not Specified  Current BMI (kg/m2): 29.7        Weight Adjustment For: No Adjustment    BMI Categories: Overweight (BMI 25.0-29. 9)    Estimated Daily Nutrient Needs:  Energy Requirements Based On: Kcal/kg  Weight Used for Energy Requirements: Current  Energy (kcal/day): 3110-9446  Weight Used for Protein Requirements: Ideal  Protein (g/day): 109-167  Method Used for Fluid Requirements: 1 ml/kcal  Fluid (ml/day): 2042-2552    Nutrition Diagnosis:   Inadequate oral intake related to acute injury/trauma as evidenced by intake 0-25%, NPO or clear liquid status due to medical condition    Nutrition Interventions:   Food and/or Nutrient Delivery: Continue Current Diet, Continue Oral Nutrition Supplement  Nutrition Education/Counseling: No recommendation at this time  Coordination of Nutrition Care: No recommendation at this time       Goals:     Goals: Meet at least 75% of estimated needs, PO intake 50% or greater       Nutrition Monitoring and Evaluation:   Behavioral-Environmental Outcomes: None Identified  Food/Nutrient Intake Outcomes: Diet Advancement/Tolerance, Food and Nutrient Intake, Supplement Intake  Physical Signs/Symptoms Outcomes: Biochemical Data, Chewing or Swallowing, Weight    Discharge Planning:     Too soon to determine     Denises Mena [address], RD, LD  Contact: [phone]

## 2022-12-21 NOTE — PROGRESS NOTES
Daily Progress Note    Date:2022  Patient: Bharat Salomon  : 1945  WMV:[id number]  CODE:Full Code No additional code details  PCP:CHERYL Romero NP    Admit Date: 2022  2:08 AM   LOS: 9 days   Subjective: Abdomen still tight. Reports abdomen still tight but he is passing gas. Courage to get up in the chair and ambulate  Summary:    The patient is a 75 yo male with a PMH of pacer, BPH, HTN, HLD, GIST with gastrectomy who presented to [facility] ED on 2022 with c/o epigastric pain, N/V, and diarrhea for ~1 week that was worsening in nature. He additionally reported a URI that had been improving until the day prior to admission. Further ED work-up revealed that the pt was + for Covid 19, CT of the abdomen and pelvis showed SBO with transition point suspected to be within the hemiabdomen. Upstream bowel loops measured up to 4.5cm. Labs revealed hyponatremia with an Na-128 and Cl-91. The patient was admitted to hospital medicine due to SBO and hyponatremia with a general surgery consult. An NGT was placed to [facility] and general surgery plans to treat conservatively upon admission. NGT remains at LIS and the patient remains strict NPO. He ultimately had failure of conserative treatment with NGT and bowel decompression and is  S/P ex lap with omentectomy and lysis of adhesions on 2022 per Dr. Alona Newberry. NGT removed and advanced to clear liquid diet today. IVF's continue with electrolytes being relatively [id number], Cl-101. Mild leukocytosis has resolved with WBC-13.2-felt to be reactionary. H&H-11.5/34.9-mild anemia felt to be r/t surgical intervention on 2022, platelets-266. Patient complained of increased pain and tightness to the abdomen, neurosurgery evaluated and CT of the abdomen with contrast oral obtained today to concern for possible leak or abscess with a bump in his white count. Reviewed by general surgery and found that all the contrast stayed in intestine.   He is passing gas when he ambulates        Lab Review   Recent Results (from the past 24 hour(s))   POCT Glucose    Collection Time: 12/19/22  9:57 PM   Result Value Ref Range    POC Glucose 110 (H) 70 - 99 mg/dl    Performed on AccuChek    CBC with Auto Differential    Collection Time: 12/20/22  1:59 AM   Result Value Ref Range    WBC 11.1 (H) 4.8 - 10.8 K/uL    RBC 3.29 (L) 4.70 - 6.10 M/uL    Hemoglobin 11.0 (L) 14.0 - 18.0 g/dL    Hematocrit 31.4 (L) 42.0 - 52.0 %    MCV 95.4 (H) 80.0 - 94.0 fL    MCH 33.4 (H) 27.0 - 31.0 pg    MCHC 35.0 33.0 - 37.0 g/dL    RDW 12.4 11.5 - 14.5 %    Platelets 585 026 - 692 K/uL    MPV 8.7 (L) 9.4 - 12.4 fL    Neutrophils % 89.9 (H) 50.0 - 65.0 %    Lymphocytes % 4.6 (L) 20.0 - 40.0 %    Monocytes % 3.9 0.0 - 10.0 %    Eosinophils % 0.7 0.0 - 5.0 %    Basophils % 0.2 0.0 - 1.0 %    Neutrophils Absolute 10.0 (H) 1.5 - 7.5 K/uL    Immature Granulocytes # 0.1 K/uL    Lymphocytes Absolute 0.5 (L) 1.1 - 4.5 K/uL    Monocytes Absolute 0.40 0.00 - 0.90 K/uL    Eosinophils Absolute 0.10 0.00 - 0.60 K/uL    Basophils Absolute 0.00 0.00 - 0.20 K/uL   Basic Metabolic Panel w/ Reflex to MG    Collection Time: 12/20/22  1:59 AM   Result Value Ref Range    Sodium 137 136 - 145 mmol/L    Potassium reflex Magnesium 3.4 (L) 3.5 - 5.0 mmol/L    Chloride 102 98 - 111 mmol/L    CO2 25 22 - 29 mmol/L    Anion Gap 10 7 - 19 mmol/L    Glucose 134 (H) 74 - 109 mg/dL    BUN 12 8 - 23 mg/dL    Creatinine 0.6 0.5 - 1.2 mg/dL    Est, Glom Filt Rate >60 >60    Calcium 7.8 (L) 8.8 - 10.2 mg/dL   Magnesium    Collection Time: 12/20/22  1:59 AM   Result Value Ref Range    Magnesium 1.5 (L) 1.6 - 2.4 mg/dL   POCT Glucose    Collection Time: 12/20/22  7:24 AM   Result Value Ref Range    POC Glucose 119 (H) 70 - 99 mg/dl    Performed on AccuChek    POCT Glucose    Collection Time: 12/20/22 11:23 AM   Result Value Ref Range    POC Glucose 114 (H) 70 - 99 mg/dl    Performed on AccuChek    POCT Glucose    Collection Time: 12/20/22  4:34 PM   Result Value Ref Range    POC Glucose 109 (H) 70 - 99 mg/dl    Performed on AccuChek        I/O last 3 completed shifts: In: 2378 [P. O.:640; I.V.:1738]  Out: 650 [Urine:650]  No intake/output data recorded.       Current Facility-Administered Medications:     simethicone (MYLICON) chewable tablet 80 mg, 80 mg, Oral, Q6H PRN, Mustapha Petit, APRN - CNP, 80 mg at 12/20/22 1409    metronidazole (FLAGYL) 500 mg in 0.9% NaCl 100 mL IVPB premix, 500 mg, IntraVENous, T6J, Katina Nam, DO, Last Rate: 100 mL/hr at 12/20/22 1250, 500 mg at 12/20/22 1250    ciprofloxacin (CIPRO) IVPB 400 mg, 400 mg, IntraVENous, C81S, Katina Nam, DO, Last Rate: 200 mL/hr at 12/20/22 1250, 400 mg at 12/20/22 1250    potassium chloride (KLOR-CON M) extended release tablet 40 mEq, 40 mEq, Oral, PRN, 40 mEq at 12/20/22 0841 **OR** potassium bicarb-citric acid (EFFER-K) effervescent tablet 40 mEq, 40 mEq, Oral, PRN **OR** potassium chloride 10 mEq/100 mL IVPB (Peripheral Line), 10 mEq, IntraVENous, PRN, Mustapha Petit, APRN - CNP    insulin lispro (HUMALOG) injection vial 0-4 Units, 0-4 Units, SubCUTAneous, TID WC, Jennie Brinks, APRN    insulin lispro (HUMALOG) injection vial 0-4 Units, 0-4 Units, SubCUTAneous, Nightly, Jennie Brinks, APRN    glucose chewable tablet 16 g, 4 tablet, Oral, PRN, Jennie Brinks, APRN    dextrose bolus 10% 125 mL, 125 mL, IntraVENous, PRN **OR** dextrose bolus 10% 250 mL, 250 mL, IntraVENous, PRN, Jennie Brinks, APRN    glucagon (rDNA) injection 1 mg, 1 mg, SubCUTAneous, PRN, Jennie Brinks, APRN    dextrose 10 % infusion, , IntraVENous, Continuous PRN, Jennie Brinks, APRN    ondansetron (ZOFRAN-ODT) disintegrating tablet 4 mg, 4 mg, Oral, Q8H PRN **OR** ondansetron (ZOFRAN) injection 4 mg, 4 mg, IntraVENous, D0W PRN, Katina Nam DO, 4 mg at 12/18/22 2042    morphine (PF) injection 2 mg, 2 mg, IntraVENous, Q2H PRN, 2 mg at 12/19/22 1345 **OR** morphine injection 4 mg, 4 mg, IntraVENous, Y9J PRN, Katina Reardondon, DO, 4 mg at 12/20/22 1409    pantoprazole (PROTONIX) 40 mg in sodium chloride (PF) 0.9 % 10 mL injection, 40 mg, IntraVENous, Daily, Katina KUMAR Viv, DO, 40 mg at 12/20/22 0841    fluticasone (FLONASE) 50 MCG/ACT nasal spray 1 spray, 1 spray, Each Nostril, Daily, Katina Nam DO, 1 spray at 12/20/22 1525    sodium chloride flush 0.9 % injection 5-40 mL, 5-40 mL, IntraVENous, 2 times per day, Katina Nam DO, 10 mL at 12/19/22 0851    0.9 % sodium chloride infusion, , IntraVENous, PRN, Katina Nam DO    enoxaparin Sodium (LOVENOX) injection 30 mg, 30 mg, SubCUTAneous, BID, Katina Nam DO, 30 mg at 12/20/22 7280    aspirin EC tablet 81 mg, 81 mg, Oral, Once per day on Mon Wed Fri, Katina Nam DO, 81 mg at 12/19/22 0850    tamsulosin (FLOMAX) capsule 0.4 mg, 0.4 mg, Oral, Daily, Katina KUMAR Viv DO, 0.4 mg at 12/20/22 8297      Physical Exam  Vitals and nursing note reviewed. Constitutional:       Appearance: He is obese. He is ill-appearing. HENT:      Head: Normocephalic. Mouth/Throat:      Mouth: Mucous membranes are moist.   Eyes:      Extraocular Movements: Extraocular movements intact. Cardiovascular:      Rate and Rhythm: Normal rate and regular rhythm. Pulses: Normal pulses. Heart sounds: Normal heart sounds. Pulmonary:      Breath sounds: Normal breath sounds. Abdominal:      General: There is distension. Tenderness: There is abdominal tenderness. There is guarding. Musculoskeletal:         General: Normal range of motion. Cervical back: Neck supple. Skin:     General: Skin is warm and dry. Coloration: Skin is pale. Neurological:      General: No focal deficit present. Mental Status: He is alert.    Psychiatric:         Mood and Affect: Mood normal.           Assessment & Plan     Principal Problem:    SBO (small bowel obstruction) (HCC)              -POD #4 ex lap lysis of adhesion and omentectomy -per Dr. Black Campos              -General surgery following              -Pain management              -Continue IVF's              -NGT removed              -clear liquid diet-[place] tomorrow              -Daily labs               -Strict I&O              -Monitor vitals               -VTE prophylaxis              -Pain and nausea management              -Supportive care   -CT of the abdomen with oral contrast-no leak or abscess found     Active Problems:  Covid-19              -remains on RA              -Droplet precautions              -Monitor              -Relatively asymptomatic     Hyperglycemia              -Continue low dose correctional protocol              -Hypoglycemic protocol              -Accuchecks     Hypertension              -Will monitor at this time-felt to be r/t pain         DVT prophylaxis: Lovenox    Full Code No additional code details    DISPOSITION:    D/C: Skilled Facility  Estimated D/C Date:  To be determined          CHERYL Fulton CNP 12/20/2022 6:14 PM

## 2022-12-21 NOTE — PROGRESS NOTES
Occupational Therapy     12/21/22 1700   Subjective   Subjective Pt in bed upon arrival for therapy and agreeable to participate this pm.   Pain Assessment   Pain Assessment None - Denies Pain   Cognition   Overall Cognitive Status WNL   Orientation   Overall Orientation Status WNL   Bed Mobility Training   Bed Mobility Training Yes   Overall Level of Assistance Minimum assistance;Stand-by assistance   Interventions Verbal cues; Tactile cues;Manual cues   Supine to Sit Minimum assistance;Stand-by assistance   Sit to Supine Stand-by assistance   Scooting Stand-by assistance   Transfer Training   Transfer Training Yes   Overall Level of Assistance Contact-guard assistance   Interventions Verbal cues; Tactile cues   Sit to Stand Contact-guard assistance   Stand to Sit Contact-guard assistance   Toilet Transfer Contact-guard assistance   Balance   Sitting Intact   Standing With support   ADL   Feeding Setup; Independent   Grooming Stand by assistance   UE Bathing Stand by assistance   LE Bathing Minimal assistance;Contact guard assistance   UE Dressing Stand by assistance   LE Dressing Minimal assistance;Contact guard assistance   Toileting Contact guard assistance   Assessment   Assessment Tx focused on functional mobility at RW level. Pt unable to make it to the bathroom this date, having an accient on the way. Pt agreeable to get into the shower after BM ran down pt's leg. Pt completed showering task with SBA/ Supervision. PCA assisted with completion of shower and dressing post shower.    Activity Tolerance Patient tolerated treatment well   Discharge Recommendations Patient would benefit from continued therapy after discharge;Continue to assess pending progress   Occupational Therapy Plan   Times Per Week 3-5   Times Per Day Once a day   OT Plan of Care   Wednesday X   Electronically signed by KEYSHA Cardenas on 12/21/2022 at 5:28 PM

## 2022-12-21 NOTE — PROGRESS NOTES
Physical Therapy  Name: Kulwant Ross  MRN:  [id number]  Date of service:  12/21/2022 12/21/22 1537   Restrictions/Precautions   Restrictions/Precautions Isolation; Fall Risk;Surgical Protocols   Required Braces or Orthoses? Yes   Implants present? Pacemaker   Position Activity Restriction   Other position/activity restrictions Abdominal binder   General   Chart Reviewed Yes   Subjective   Subjective Pt ready to walk. Pain Assessment   Pain Assessment None - Denies Pain   Bed Mobility   Supine to Sit Minimal assistance   Transfers   Sit to Stand Contact guard assistance   Stand to Sit Contact guard assistance   Ambulation   Surface Level tile   Device Rolling Walker   Assistance Contact guard assistance   Quality of Gait steady   Gait Deviations Slow Sandra;Decreased step length;Decreased step height   Distance 250'   Other Activities   Comment pt assisted with clean up while sitting on toilet d/t incontinence of bowel, then assisted into the shower and PCA came in to assist   Short Term Goals   Time Frame for Short Term Goals 2 wks   Short Term Goal 1 supine to sit indep   Short Term Goal 2 sit to stand indep   Short Term Goal 3 amb. 200' indep   Conditions Requiring Skilled Therapeutic Intervention   Body Structures, Functions, Activity Limitations Requiring Skilled Therapeutic Intervention Decreased functional mobility ; Decreased ROM; Decreased endurance;Decreased cognition;Decreased strength;Decreased balance; Increased pain   Assessment Pt able to tolerate ambulation well with rwx, however became incontinent of bowel and required assistance to clean up and getting into the shower. Pt left with PCA and RN to finish shower.    Activity Tolerance   Activity Tolerance Patient tolerated treatment well   PT Plan of Care   Wednesday X   Safety Devices   Type of Devices Gait belt;Nurse notified         Electronically signed by Fransisco Khan PTA on 12/21/2022 at 3:39 PM

## 2022-12-21 NOTE — PROGRESS NOTES
[id number] [facility] Surgery Progress Note    Chief Complaint:  Chief Complaint   Patient presents with    Abdominal Pain     Pt reports abdominal pain. Emesis     Pt reports vomiting. Pt reports diarrhea this morning. Pt states that he has not had a normal BM in a week. POD # 7    S: Mr. Sarah Correia is seen and examined at bedside. He continues to have flatus and bowel movements. He has incisional pain. Oxygen requirements are improved. Slightly distended today. O:   BP (!) 150/85   Pulse 82   Temp 97 °F (36.1 °C) (Temporal)   Resp 16   Ht 6' 1\" (1.854 m)   Wt 225 lb (102.1 kg)   SpO2 96%   BMI 29.69 kg/m²   I/O reviewed and appropriate  CONSTITUTIONAL: Alert, appropriate, no acute distress  SKIN: warm, dry with no rashes or lesions  HEENT: NCAT, Non icteric, PERR. Trachea Midline. CHEST/LUNGS: CTA bilaterally. Normal respiratory effort. CARDIOVASCULAR: RRR, No edema. ABDOMEN: soft, slightly distended, appropriately TTP, +BS. Abd binder in place. Incisions: Clean, dry, and intact with staples; no erythema or induration. Some ecchymosis in the right lower abdomen. NEUROLOGIC: CN II-XI grossly intact, no motor or sensory deficits   MUSCULOSKELETAL: No clubbing or cyanosis. PSYCHIATRIC:  Normal Mood and Affect. Alert and Oriented.     LABS:   CBC:   Recent Labs     12/19/22 0258 12/20/22 0159 12/21/22  0328   WBC 14.0* 11.1* 11.4*   RBC 3.50* 3.29* 3.30*   HGB 11.5* 11.0* 10.9*   HCT 34.0* 31.4* 32.3*    273 255   LYMPHOPCT 2.1* 4.6* 6.1*   MONOPCT 4.6 3.9 4.3   BASOPCT 0.3 0.2 0.3   MONOSABS 0.70 0.40 0.50   LYMPHSABS 0.3* 0.5* 0.7*   EOSABS 0.30 0.10 0.30   BASOSABS 0.00 0.00 0.00      BMP:   Recent Labs     12/19/22 0258 12/20/22 0159 12/21/22  0328    137 136   K 3.2* 3.4* 4.2    102 99   CO2 25 25 26   ANIONGAP 11 10 11   GLUCOSE 133* 134* 111*   CREATININE 0.5 0.6 0.6   LABGLOM >60 >60 >60   CALCIUM 8.3* 7.8* 8.0*       A: Principal Problem:    SBO (small bowel obstruction) ([facility] [address]  Active Problems:    Hyponatremia    COVID-19    Hyperglycemia  Resolved Problems:    * No resolved hospital problems. *      P:   Multimodal analgesia. Advance diet. Barring no major change of events, anticipate patient and return to his home tomorrow with home health. He will continue to benefit from PT while at home. He may follow with general surgery as an outpatient in two weeks. Continue abdominal binder when out of bed. Encouraged ambulation. Reviewed with patient's family who note understanding.       DO Janis   Electronically Signed on 12/21/2022 at 5:13 PM

## 2022-12-21 NOTE — PROGRESS NOTES
Daily Progress Note    Date:2022  Patient: Akanksha Torres  : 1945  VUU:[id number]  CODE:Full Code No additional code details  PCP:CHERYL Montemayor NP    Admit Date: 2022  2:08 AM   LOS: 10 days   Subjective: Abdomen still tight. Reports abdomen still tight but he is passing gas. Courage to get up in the chair and ambulate  Summary:    The patient is a 77 yo male with a PMH of pacer, BPH, HTN, HLD, GIST with gastrectomy who presented to [facility] ED on 2022 with c/o epigastric pain, N/V, and diarrhea for ~1 week that was worsening in nature. He additionally reported a URI that had been improving until the day prior to admission. Further ED work-up revealed that the pt was + for Covid 19, CT of the abdomen and pelvis showed SBO with transition point suspected to be within the hemiabdomen. Upstream bowel loops measured up to 4.5cm. Labs revealed hyponatremia with an Na-128 and Cl-91. The patient was admitted to hospital medicine due to SBO and hyponatremia with a general surgery consult. An NGT was placed to [facility] and general surgery plans to treat conservatively upon admission. NGT remains at LIS and the patient remains strict NPO. He ultimately had failure of conserative treatment with NGT and bowel decompression and is  S/P ex lap with omentectomy and lysis of adhesions on 2022 per Dr. Rajat Kitchen. NGT removed and advanced to clear liquid diet today. IVF's continue with electrolytes being relatively [id number], Cl-101. Mild leukocytosis has resolved with WBC-13.2-felt to be reactionary. H&H-11.5/34.9-mild anemia felt to be r/t surgical intervention on 2022, platelets-266. Patient complained of increased pain and tightness to the abdomen, neurosurgery evaluated and CT of the abdomen with contrast oral obtained today to concern for possible leak or abscess with a bump in his white count. Reviewed by general surgery and found that all the contrast stayed in intestine.   He is passing gas when he ambulates. His diet is advancing and anticipate dc home tomorrow with home health.           Lab Review   Recent Results (from the past 24 hour(s))   POCT Glucose    Collection Time: 12/20/22  7:37 PM   Result Value Ref Range    POC Glucose 107 (H) 70 - 99 mg/dl    Performed on AccuChek    CBC with Auto Differential    Collection Time: 12/21/22  3:28 AM   Result Value Ref Range    WBC 11.4 (H) 4.8 - 10.8 K/uL    RBC 3.30 (L) 4.70 - 6.10 M/uL    Hemoglobin 10.9 (L) 14.0 - 18.0 g/dL    Hematocrit 32.3 (L) 42.0 - 52.0 %    MCV 97.9 (H) 80.0 - 94.0 fL    MCH 33.0 (H) 27.0 - 31.0 pg    MCHC 33.7 33.0 - 37.0 g/dL    RDW 12.6 11.5 - 14.5 %    Platelets 214 978 - 075 K/uL    MPV 8.4 (L) 9.4 - 12.4 fL    Neutrophils % 85.7 (H) 50.0 - 65.0 %    Lymphocytes % 6.1 (L) 20.0 - 40.0 %    Monocytes % 4.3 0.0 - 10.0 %    Eosinophils % 2.2 0.0 - 5.0 %    Basophils % 0.3 0.0 - 1.0 %    Neutrophils Absolute 9.8 (H) 1.5 - 7.5 K/uL    Immature Granulocytes # 0.2 K/uL    Lymphocytes Absolute 0.7 (L) 1.1 - 4.5 K/uL    Monocytes Absolute 0.50 0.00 - 0.90 K/uL    Eosinophils Absolute 0.30 0.00 - 0.60 K/uL    Basophils Absolute 0.00 0.00 - 0.20 K/uL   Basic Metabolic Panel w/ Reflex to MG    Collection Time: 12/21/22  3:28 AM   Result Value Ref Range    Sodium 136 136 - 145 mmol/L    Potassium reflex Magnesium 4.2 3.5 - 5.0 mmol/L    Chloride 99 98 - 111 mmol/L    CO2 26 22 - 29 mmol/L    Anion Gap 11 7 - 19 mmol/L    Glucose 111 (H) 74 - 109 mg/dL    BUN 14 8 - 23 mg/dL    Creatinine 0.6 0.5 - 1.2 mg/dL    Est, Glom Filt Rate >60 >60    Calcium 8.0 (L) 8.8 - 10.2 mg/dL   POCT Glucose    Collection Time: 12/21/22  7:47 AM   Result Value Ref Range    POC Glucose 91 70 - 99 mg/dl    Performed on AccuChek    POCT Glucose    Collection Time: 12/21/22 11:17 AM   Result Value Ref Range    POC Glucose 96 70 - 99 mg/dl    Performed on AccuChek    POCT Glucose    Collection Time: 12/21/22  4:14 PM   Result Value Ref Range    POC IntraVENous, Y5Y PRN, Katina KUMAR Viv DO, 4 mg at 12/21/22 0329    pantoprazole (PROTONIX) 40 mg in sodium chloride (PF) 0.9 % 10 mL injection, 40 mg, IntraVENous, Daily, Katina KUMAR Viv DO, 40 mg at 12/21/22 0944    fluticasone (FLONASE) 50 MCG/ACT nasal spray 1 spray, 1 spray, Each Nostril, Daily, Katina Nam DO, 1 spray at 12/21/22 0951    sodium chloride flush 0.9 % injection 5-40 mL, 5-40 mL, IntraVENous, 2 times per day, Katina STACY Viv DO, 10 mL at 12/21/22 0950    0.9 % sodium chloride infusion, , IntraVENous, PRN, Katina Nam DO    enoxaparin Sodium (LOVENOX) injection 30 mg, 30 mg, SubCUTAneous, BID, Katina Nam DO, 30 mg at 12/21/22 0930    aspirin EC tablet 81 mg, 81 mg, Oral, Once per day on Mon Wed Fri, Katina Nam DO, 81 mg at 12/21/22 0944    tamsulosin (FLOMAX) capsule 0.4 mg, 0.4 mg, Oral, Daily, Katina KUMAR Viv DO, 0.4 mg at 12/21/22 0148      Physical Exam  Vitals and nursing note reviewed. Constitutional:       Appearance: He is obese. He is ill-appearing. HENT:      Head: Normocephalic. Mouth/Throat:      Mouth: Mucous membranes are moist.   Eyes:      Extraocular Movements: Extraocular movements intact. Cardiovascular:      Rate and Rhythm: Normal rate and regular rhythm. Pulses: Normal pulses. Heart sounds: Normal heart sounds. Pulmonary:      Breath sounds: Normal breath sounds. Abdominal:      General: There is distension. Tenderness: There is abdominal tenderness. There is guarding. Musculoskeletal:         General: Normal range of motion. Cervical back: Neck supple. Skin:     General: Skin is warm and dry. Coloration: Skin is pale. Neurological:      General: No focal deficit present. Mental Status: He is alert.    Psychiatric:         Mood and Affect: Mood normal.           Assessment & Plan     Principal Problem:    SBO (small bowel obstruction) (HCC)              - ex lap lysis of adhesion and omentectomy -per  Viv              -General surgery following              -Pain management              -Continue IVF's              -NGT removed              -              -Daily labs               -Strict I&O              -Monitor vitals               -VTE prophylaxis              -Pain and nausea management              -Supportive care   -CT of the abdomen with oral contrast-no leak or abscess found     Active Problems:  Covid-19              -remains on RA              -Droplet precautions              -Monitor              -Relatively asymptomatic     Hyperglycemia              -Continue low dose correctional protocol              -Hypoglycemic protocol              -Accuchecks     Hypertension              -Will monitor at this time-felt to be r/t pain         DVT prophylaxis: Lovenox    Full Code No additional code details    DISPOSITION:    D/C: Skilled Facility  Estimated D/C Date:  To be determined          CHERYL Cooper CNP 12/21/2022 5:41 PM

## 2022-12-22 VITALS
BODY MASS INDEX: 29.82 KG/M2 | OXYGEN SATURATION: 95 % | SYSTOLIC BLOOD PRESSURE: 161 MMHG | RESPIRATION RATE: 20 BRPM | TEMPERATURE: 97.2 F | WEIGHT: 225 LBS | HEIGHT: 73 IN | HEART RATE: 85 BPM | DIASTOLIC BLOOD PRESSURE: 92 MMHG

## 2022-12-22 LAB
ANION GAP SERPL CALCULATED.3IONS-SCNC: 11 MMOL/L (ref 7–19)
BASOPHILS ABSOLUTE: 0 K/UL (ref 0–0.2)
BASOPHILS RELATIVE PERCENT: 0.2 % (ref 0–1)
BUN BLDV-MCNC: 10 MG/DL (ref 8–23)
CALCIUM SERPL-MCNC: 7.6 MG/DL (ref 8.8–10.2)
CHLORIDE BLD-SCNC: 98 MMOL/L (ref 98–111)
CO2: 25 MMOL/L (ref 22–29)
CREAT SERPL-MCNC: 0.5 MG/DL (ref 0.5–1.2)
EOSINOPHILS ABSOLUTE: 0.3 K/UL (ref 0–0.6)
EOSINOPHILS RELATIVE PERCENT: 2.3 % (ref 0–5)
GFR SERPL CREATININE-BSD FRML MDRD: >60 ML/MIN/{1.73_M2}
GLUCOSE BLD-MCNC: 113 MG/DL (ref 70–99)
GLUCOSE BLD-MCNC: 118 MG/DL (ref 74–109)
GLUCOSE BLD-MCNC: 122 MG/DL (ref 70–99)
HCT VFR BLD CALC: 31.9 % (ref 42–52)
HEMOGLOBIN: 10.9 G/DL (ref 14–18)
IMMATURE GRANULOCYTES #: 0.1 K/UL
LYMPHOCYTES ABSOLUTE: 0.8 K/UL (ref 1.1–4.5)
LYMPHOCYTES RELATIVE PERCENT: 7.8 % (ref 20–40)
MAGNESIUM: 1.6 MG/DL (ref 1.6–2.4)
MCH RBC QN AUTO: 32.8 PG (ref 27–31)
MCHC RBC AUTO-ENTMCNC: 34.2 G/DL (ref 33–37)
MCV RBC AUTO: 96.1 FL (ref 80–94)
MONOCYTES ABSOLUTE: 0.5 K/UL (ref 0–0.9)
MONOCYTES RELATIVE PERCENT: 4.9 % (ref 0–10)
NEUTROPHILS ABSOLUTE: 9 K/UL (ref 1.5–7.5)
NEUTROPHILS RELATIVE PERCENT: 83.7 % (ref 50–65)
PDW BLD-RTO: 12.5 % (ref 11.5–14.5)
PERFORMED ON: ABNORMAL
PERFORMED ON: ABNORMAL
PLATELET # BLD: 294 K/UL (ref 130–400)
PMV BLD AUTO: 8.9 FL (ref 9.4–12.4)
POTASSIUM REFLEX MAGNESIUM: 3.4 MMOL/L (ref 3.5–5)
RBC # BLD: 3.32 M/UL (ref 4.7–6.1)
SODIUM BLD-SCNC: 134 MMOL/L (ref 136–145)
WBC # BLD: 10.7 K/UL (ref 4.8–10.8)

## 2022-12-22 PROCEDURE — 2580000003 HC RX 258: Performed by: SURGERY

## 2022-12-22 PROCEDURE — 83735 ASSAY OF MAGNESIUM: CPT

## 2022-12-22 PROCEDURE — 6370000000 HC RX 637 (ALT 250 FOR IP): Performed by: NURSE PRACTITIONER

## 2022-12-22 PROCEDURE — 80048 BASIC METABOLIC PNL TOTAL CA: CPT

## 2022-12-22 PROCEDURE — 2500000003 HC RX 250 WO HCPCS: Performed by: SURGERY

## 2022-12-22 PROCEDURE — 36415 COLL VENOUS BLD VENIPUNCTURE: CPT

## 2022-12-22 PROCEDURE — 6360000002 HC RX W HCPCS: Performed by: SURGERY

## 2022-12-22 PROCEDURE — 6370000000 HC RX 637 (ALT 250 FOR IP): Performed by: SURGERY

## 2022-12-22 PROCEDURE — 99024 POSTOP FOLLOW-UP VISIT: CPT | Performed by: SURGERY

## 2022-12-22 PROCEDURE — 85025 COMPLETE CBC W/AUTO DIFF WBC: CPT

## 2022-12-22 PROCEDURE — 82947 ASSAY GLUCOSE BLOOD QUANT: CPT

## 2022-12-22 PROCEDURE — C9113 INJ PANTOPRAZOLE SODIUM, VIA: HCPCS | Performed by: SURGERY

## 2022-12-22 RX ORDER — CLOTRIMAZOLE AND BETAMETHASONE DIPROPIONATE 10; .64 MG/G; MG/G
CREAM TOPICAL
Qty: 1 EACH | Refills: 0 | Status: SHIPPED | OUTPATIENT
Start: 2022-12-22

## 2022-12-22 RX ORDER — METRONIDAZOLE 500 MG/1
500 TABLET ORAL 3 TIMES DAILY
Qty: 12 TABLET | Refills: 0 | Status: SHIPPED | OUTPATIENT
Start: 2022-12-22 | End: 2022-12-26

## 2022-12-22 RX ORDER — HYDROCODONE BITARTRATE AND ACETAMINOPHEN 5; 325 MG/1; MG/1
1 TABLET ORAL EVERY 6 HOURS PRN
Qty: 18 TABLET | Refills: 0 | Status: SHIPPED | OUTPATIENT
Start: 2022-12-22 | End: 2022-12-25

## 2022-12-22 RX ORDER — FLUTICASONE PROPIONATE 50 MCG
1 SPRAY, SUSPENSION (ML) NASAL DAILY
Qty: 16 G | Refills: 3 | Status: SHIPPED | OUTPATIENT
Start: 2022-12-22

## 2022-12-22 RX ORDER — SIMETHICONE 80 MG
80 TABLET,CHEWABLE ORAL EVERY 6 HOURS PRN
Qty: 16 TABLET | Refills: 0 | Status: SHIPPED | OUTPATIENT
Start: 2022-12-22 | End: 2022-12-26

## 2022-12-22 RX ADMIN — FLUTICASONE PROPIONATE 1 SPRAY: 50 SPRAY, METERED NASAL at 07:50

## 2022-12-22 RX ADMIN — METRONIDAZOLE 500 MG: 500 INJECTION, SOLUTION INTRAVENOUS at 03:21

## 2022-12-22 RX ADMIN — POTASSIUM CHLORIDE 40 MEQ: 1500 TABLET, EXTENDED RELEASE ORAL at 03:18

## 2022-12-22 RX ADMIN — BACITRACIN ZINC NEOMYCIN SULFATE POLYMYXIN B SULFATE: 400; 3.5; 5 OINTMENT TOPICAL at 07:49

## 2022-12-22 RX ADMIN — POTASSIUM BICARBONATE 40 MEQ: 782 TABLET, EFFERVESCENT ORAL at 07:36

## 2022-12-22 RX ADMIN — SODIUM CHLORIDE, PRESERVATIVE FREE 40 MG: 5 INJECTION INTRAVENOUS at 07:50

## 2022-12-22 RX ADMIN — CLOTRIMAZOLE AND BETAMETHASONE DIPROPIONATE: 10; .5 CREAM TOPICAL at 07:49

## 2022-12-22 RX ADMIN — SODIUM CHLORIDE, PRESERVATIVE FREE 10 ML: 5 INJECTION INTRAVENOUS at 07:50

## 2022-12-22 RX ADMIN — TAMSULOSIN HYDROCHLORIDE 0.4 MG: 0.4 CAPSULE ORAL at 07:48

## 2022-12-22 RX ADMIN — ENOXAPARIN SODIUM 30 MG: 100 INJECTION SUBCUTANEOUS at 07:49

## 2022-12-22 NOTE — PROGRESS NOTES
CLINICAL PHARMACY NOTE: MEDS TO BEDS    Total # of Prescriptions Filled: 5   The following medications were delivered to the patient:  Current Discharge Medication List        START taking these medications    Details   clotrimazole-betamethasone (LOTRISONE) 1-0.05 % cream Apply topically 2 times daily. Qty: 1 each, Refills: 0      fluticasone (FLONASE) 50 MCG/ACT nasal spray 1 spray by Each Nostril route daily  Qty: 16 g, Refills: 3      simethicone (MYLICON) 80 MG chewable tablet Take 1 tablet by mouth every 6 hours as needed for Flatulence  Qty: 16 tablet, Refills: 0      HYDROcodone-acetaminophen (NORCO) 5-325 MG per tablet Take 1 tablet by mouth every 6 hours as needed for Pain for up to 3 days. Intended supply: 30 days Max Daily Amount: 4 tablets  Qty: 18 tablet, Refills: 0    Comments: Reduce doses taken as pain becomes manageable  Associated Diagnoses: Small bowel obstruction (HCC)      metroNIDAZOLE (FLAGYL) 500 MG tablet Take 1 tablet by mouth 3 times daily for 4 days  Qty: 12 tablet, Refills: 0               Additional Documentation:   Patients wife paid for prescriptions. Gave to 4th floor nurses station.

## 2022-12-22 NOTE — DISCHARGE SUMMARY
[place], [place], [address]    DEPARTMENT OF HOSPITALIST MEDICINE      DISCHARGE SUMMARY:      PATIENT NAME:  Marquis Lopez  :  1945  MRN:  [id number]    Admission Date:   2022  2:08 AM Attending: Sammi Pereira MD   Discharge Date:   2022              PCP: Tama Boeck, APRN - NP  Length of Stay: 11 days     Chief Complaint on Admission:   Chief Complaint   Patient presents with    Abdominal Pain     Pt reports abdominal pain. Emesis     Pt reports vomiting. Pt reports diarrhea this morning. Pt states that he has not had a normal BM in a week. Consultants:     IP CONSULT TO GENERAL SURGERY  IP CONSULT TO HOME CARE NEEDS  IP CONSULT TO HOME CARE NEEDS       Discharge Problem List:   Principal Problem:    SBO (small bowel obstruction) (HCC)  Active Problems:    Hyponatremia    COVID-19    Hyperglycemia  Resolved Problems:    * No resolved hospital problems. *         Last dated Assessment and Plan . .. 2022      CUMULATIVE  HOSPITAL  COURSE  AND  TREATMENT:      OBJECTIVE:  The patient is a 75 yo male with a PMH of pacer, BPH, HTN, HLD, GIST with gastrectomy who presented to [facility] ED on 2022 with c/o epigastric pain, N/V, and diarrhea for ~1 week that was worsening in nature. He additionally reported a URI that had been improving until the day prior to admission. Further ED work-up revealed that the pt was + for Covid 19, CT of the abdomen and pelvis showed SBO with transition point suspected to be within the hemiabdomen. Upstream bowel loops measured up to 4.5cm. Labs revealed hyponatremia with an Na-128 and Cl-91. The patient was admitted to hospital medicine due to SBO and hyponatremia with a general surgery consult. An NGT was placed to [facility] and general surgery plans to treat conservatively upon admission. NGT remains at LIS and the patient remains strict NPO.  He ultimately had failure of conserative treatment with NGT and bowel decompression and is  S/P ex lap with omentectomy and lysis of adhesions on 12/14/2022 per Dr. Xenia Saenz. NGT removed and advanced to clear liquid diet today. IVF's continue with electrolytes being relatively [id number], Cl-101. Mild leukocytosis has resolved with WBC-13.2-felt to be reactionary. H&H-11.5/34.9-mild anemia felt to be r/t surgical intervention on 12/14/2022, platelets-266. Patient complained of increased pain and tightness to the abdomen, neurosurgery evaluated and CT of the abdomen with contrast oral obtained today to concern for possible leak or abscess with a bump in his white count. Reviewed by general surgery and found that all the contrast stayed in intestine. He is passing gas when he ambulates. His diet is advancing and anticipate dc home tomorrow with home health. Heart: RRR   Lungs: Bilateral fair air entry   Abdomen: Soft, non-tender   Extremities: No edema   Neurologic: Alert and oriented   Skin: Warm and dry          Laboratory Data:  Recent Labs     12/20/22  0159 12/21/22  0328 12/22/22  0127   WBC 11.1* 11.4* 10.7   HGB 11.0* 10.9* 10.9*    255 294     Recent Labs     12/20/22  0159 12/21/22  0328 12/22/22  0127    136 134*   K 3.4* 4.2 3.4*    99 98   CO2 25 26 25   BUN 12 14 10   CREATININE 0.6 0.6 0.5   GLUCOSE 134* 111* 118*     No results for input(s): AST, ALT, ALB, BILITOT, ALKPHOS in the last 72 hours. Troponin T: No results for input(s): TROPONINI in the last 72 hours. Pro-BNP: No results for input(s): BNP in the last 72 hours. INR: No results for input(s): INR in the last 72 hours. UA:  Recent Labs     12/19/22  1430   COLORU YELLOW   PHUR 6.0   WBCUA 2   RBCUA 4   BACTERIA NEGATIVE*   CLARITYU Clear   SPECGRAV >=1.045   LEUKOCYTESUR Negative   UROBILINOGEN 1.0   BILIRUBINUR Negative   BLOODU TRACE*   GLUCOSEU Negative     A1C: No results for input(s): LABA1C in the last 72 hours.   ABG:No results for input(s): PHART, RYN7VIA, PO2ART, VBP0NCL, BEART, HGBAE, T0WIHEJQ, CARBOXHGBART in the last 72 hours. Impressions of imaging performed in 48 hours before discharge:    No results found. Medication List        START taking these medications      clotrimazole-betamethasone 1-0.05 % cream  Commonly known as: LOTRISONE  Apply topically 2 times daily. fluticasone 50 MCG/ACT nasal spray  Commonly known as: FLONASE  1 spray by Each Nostril route daily     HYDROcodone-acetaminophen 5-325 MG per tablet  Commonly known as: Norco  Take 1 tablet by mouth every 6 hours as needed for Pain for up to 3 days.  Intended supply: 30 days Max Daily Amount: 4 tablets     metroNIDAZOLE 500 MG tablet  Commonly known as: FLAGYL  Take 1 tablet by mouth 3 times daily for 4 days     simethicone 80 MG chewable tablet  Commonly known as: MYLICON  Take 1 tablet by mouth every 6 hours as needed for Flatulence            CONTINUE taking these medications      aspirin 81 MG tablet     lisinopril 10 MG tablet  Commonly known as: PRINIVIL;ZESTRIL     lovastatin 40 MG tablet  Commonly known as: MEVACOR     omeprazole 40 MG delayed release capsule  Commonly known as: PRILOSEC     tamsulosin 0.4 MG capsule  Commonly known as: FLOMAX     therapeutic multivitamin-minerals tablet     Vitamin D3 50 MCG (2000 UT) Caps     zinc 50 MG Tabs tablet            STOP taking these medications      Apoaequorin 10 MG Caps     cetirizine 10 MG tablet  Commonly known as: ZYRTEC               Where to Get Your Medications        These medications were sent to [facility], [address] [place] [address] Road  [address] [place] [address]      Phone: [phone]   clotrimazole-betamethasone 1-0.05 % cream  fluticasone 50 MCG/ACT nasal spray  HYDROcodone-acetaminophen 5-325 MG per tablet  metroNIDAZOLE 500 MG tablet  simethicone 80 MG chewable tablet           ISSUES TO BE ADDRESSED AT HOSPITAL FOLLOW-UP VISIT:    Advised patient to follow-up closely with PCP upon discharge for management of chronic medical issues  Please see the detailed discharge directions delivered from earlier today! Condition on Discharge: stable  Discharge Disposition: Home with [address] [facility]    Recommended Follow Up:  CHERYL Acuña NP  [address] [place] [address]  [phone]    Follow up      Jasvir Briseno, [address]  [phone]    Follow up      Followup Appointments Scheduled at Time of Discharge:  Future Appointments   Date Time Provider Barbie Dooley   6/6/2023 11:00 AM Kuldip Coppola MD [address] [facility]        Discharge Instructions:   Please see the discharge paperwork. Patient was seen at bedside today, and the examination shows improvement since yesterday. Detailed discharge directions delivered to the patient by myself and our nursing staff, who verbalizes understanding and is very happy and satisfied with the plan. Patient has been advised to continue all medications as prescribed and advised, and f/u with PCP within 1 week. Patient is stable from medical standpoint to be discharged. Total time spent during patient evaluation and assessment, discussion with the nurse/family, addressing discharge medications/scripts and coordination of care for safe discharge was in excess of 35 minutes.       Signed Electronically:    CHERYL Galdamez CNP  8:01 AM 12/22/2022

## 2022-12-22 NOTE — PROGRESS NOTES
increase demand for energy/nutrients as evidenced by intake 0-25%, wounds    Nutrition Interventions:   Food and/or Nutrient Delivery: Continue Current Diet, Continue Oral Nutrition Supplement  Nutrition Education/Counseling: No recommendation at this time  Coordination of Nutrition Care: Continue to monitor while inpatient       Goals:  Previous Goal Met: Progressing toward Goal(s)  Goals: Meet at least 75% of estimated needs, PO intake 50% or greater       Nutrition Monitoring and Evaluation:   Behavioral-Environmental Outcomes: None Identified  Food/Nutrient Intake Outcomes: Food and Nutrient Intake, Supplement Intake  Physical Signs/Symptoms Outcomes: Biochemical Data, Chewing or Swallowing, Fluid Status or Edema, Weight, Skin    Discharge Planning:    No discharge needs at this time     Krzysztof Johnson, MS, RD, LD  Contact: [phone]

## 2022-12-22 NOTE — PLAN OF CARE
Problem: Nutrition Deficit:  Goal: Optimize nutritional status  12/22/2022 0836 by Iza Thibodeaux, MS, RD, LD  Outcome: Progressing  Flowsheets (Taken 12/22/2022 0830)  Nutrient intake appropriate for improving, restoring, or maintaining nutritional needs:   Assess nutritional status and recommend course of action   Monitor oral intake, labs, and treatment plans   Recommend appropriate diets, oral nutritional supplements, and vitamin/mineral supplements  12/22/2022 0106 by Janelle Beard RN  Outcome: Progressing

## 2022-12-22 NOTE — CARE COORDINATION
Spoke with patient regarding MD orders for [facility] services. Patient agreeable and has chosen [facility]. Referral Faxed. [facility] [phone]. [phone]. Please notify [facility] when patient discharges and fax DC Summary,  DC med list and any new [facility] orders. The Patient and/or patient representative was provided with a choice of provider and agrees   with the discharge plan. [x] Yes [] No    Freedom of choice list was provided with basic dialogue that supports the patient's individualized plan of care/goals, treatment preferences and shares the quality data associated with the providers.  [x] Yes [] No  Electronically signed by Darinel Armstrong on 12/22/2022 at 9:48 AM

## 2022-12-22 NOTE — PLAN OF CARE
Problem: ABCDS Injury Assessment  Goal: Absence of physical injury  12/22/2022 0106 by Cindy Francois RN  Outcome: Progressing  12/21/2022 1130 by Wade Renee RN  Outcome: Progressing     Problem: Pain  Goal: Verbalizes/displays adequate comfort level or baseline comfort level  12/22/2022 0106 by Cindy Francois RN  Outcome: Progressing  12/21/2022 1130 by Wade Renee RN  Outcome: Progressing     Problem: Gastrointestinal - Adult  Goal: Minimal or absence of nausea and vomiting  12/22/2022 0106 by Cindy Francois RN  Outcome: Progressing  12/21/2022 1130 by Wade Renee RN  Outcome: Progressing     Problem: Gastrointestinal - Adult  Goal: Minimal or absence of nausea and vomiting  12/22/2022 0106 by Cindy Francois RN  Outcome: Progressing  12/21/2022 1130 by Wade Renee RN  Outcome: Progressing  Goal: Maintains or returns to baseline bowel function  12/22/2022 0106 by Cindy Francois RN  Outcome: Progressing  12/21/2022 1130 by Wade Renee RN  Outcome: Progressing  Goal: Maintains adequate nutritional intake  12/22/2022 0106 by Cindy Francois RN  Outcome: Progressing  12/21/2022 1130 by Wade Renee RN  Outcome: Progressing  Goal: Establish and maintain optimal ostomy function  12/22/2022 0106 by Cindy Francois RN  Outcome: Progressing  12/21/2022 1130 by Wade Renee RN  Outcome: Progressing     Problem: Infection - Adult  Goal: Absence of infection at discharge  12/22/2022 0106 by Cindy Francois RN  Outcome: Progressing  12/21/2022 1130 by Wade Renee RN  Outcome: Progressing  Goal: Absence of infection during hospitalization  12/22/2022 0106 by Cindy Francois RN  Outcome: Progressing  12/21/2022 1130 by Wade Renee RN  Outcome: Progressing  Goal: Absence of fever/infection during anticipated neutropenic period  12/22/2022 0106 by Cindy Francois RN  Outcome: Progressing  12/21/2022 1130 by Wade Renee RN  Outcome: Progressing     Problem: Discharge Planning  Goal: Discharge to home or other facility with appropriate resources  12/22/2022 0106 by Adi Vee RN  Outcome: Progressing  12/21/2022 1130 by Nanette Paris RN  Outcome: Progressing     Problem: Safety - Adult  Goal: Free from fall injury  12/22/2022 0106 by Adi Vee RN  Outcome: Progressing  12/21/2022 1130 by Nanette Paris RN  Outcome: Progressing     Problem: Nutrition Deficit:  Goal: Optimize nutritional status  12/22/2022 0106 by Adi Vee RN  Outcome: Progressing  12/21/2022 1130 by Nanette Paris RN  Outcome: Progressing

## 2022-12-22 NOTE — CARE COORDINATION
IMM letter given to and explained to patient. Patient verbalized understanding. IMM not signed due to Covid isolation. Met with patient and patient's spouse to explain that insurance does not cover the cost of a shower chair. Cost will be out-of-pocket. Per [employer], cost of shower chair without a back is $29.78 and with a back is $41.50. Patient chose to order the shower chair with a back. Shower chair ordered from [address] [place] [address]. Shower chair will be delivered to patient's home.   [employer]   P [phone]  F [phone]  Electronically signed by Sonia Dimas RN on 12/22/2022 at 8:48 AM

## 2022-12-22 NOTE — PROGRESS NOTES
[facility] Progress Note    Chief Complaint:  Chief Complaint   Patient presents with    Abdominal Pain     Pt reports abdominal pain. Emesis     Pt reports vomiting. Pt reports diarrhea this morning. Pt states that he has not had a normal BM in a week. POD # 8    S: Mr. Kailee Alfonso is up to the bathroom at today's visit. He states he is doing well. His wife's questions are answered. O:   BP (!) 161/92   Pulse 85   Temp 97.2 °F (36.2 °C) (Temporal)   Resp 20   Ht 6' 1\" (1.854 m)   Wt 225 lb (102.1 kg)   SpO2 95%   BMI 29.69 kg/m²   I/O reviewed and appropriate  CONSTITUTIONAL: Alert, appropriate, no acute distress  NEUROLOGIC: CN II-XI grossly intact, no motor or sensory deficits   PSYCHIATRIC:  Normal Mood and Affect. Alert and Oriented. LABS:   CBC:   Recent Labs     12/20/22  0159 12/21/22 0328 12/22/22  0127   WBC 11.1* 11.4* 10.7   RBC 3.29* 3.30* 3.32*   HGB 11.0* 10.9* 10.9*   HCT 31.4* 32.3* 31.9*    255 294   LYMPHOPCT 4.6* 6.1* 7.8*   MONOPCT 3.9 4.3 4.9   BASOPCT 0.2 0.3 0.2   MONOSABS 0.40 0.50 0.50   LYMPHSABS 0.5* 0.7* 0.8*   EOSABS 0.10 0.30 0.30   BASOSABS 0.00 0.00 0.00      BMP:   Recent Labs     12/20/22  0159 12/21/22  0328 12/22/22  0127    136 134*   K 3.4* 4.2 3.4*    99 98   CO2 25 26 25   ANIONGAP 10 11 11   GLUCOSE 134* 111* 118*   CREATININE 0.6 0.6 0.5   LABGLOM >60 >60 >60   CALCIUM 7.8* 8.0* 7.6*       A: Principal Problem:    SBO (small bowel obstruction) ([facility])  Active Problems:    Hyponatremia    COVID-19    Hyperglycemia  Resolved Problems:    * No resolved hospital problems. *      P:     Surgically stable for discharge. He may follow with general surgery as an outpatient in two weeks. Continue abdominal binder when out of bed. Encouraged ambulation. Reviewed with patient's family who note understanding.       Nadine Rodriguez DO   Electronically Signed on 12/22/2022 at 10:59 AM

## 2022-12-22 NOTE — DISCHARGE INSTRUCTIONS
Diet as tolerated  Complete 4 days of additional antibiotics  Follow up with PCP and Gen surg. Hydrate well  Pain medicine if tylenol alone not sufficient.   Continue to do incentive spirometry  Ambulate around house every two hours while awake  Wear binder when out of bed

## 2022-12-24 ENCOUNTER — DOCUMENTATION (OUTPATIENT)
Dept: INTERNAL MEDICINE | Facility: CLINIC | Age: 77
End: 2022-12-24

## 2022-12-24 ENCOUNTER — NURSE TRIAGE (OUTPATIENT)
Dept: CALL CENTER | Facility: HOSPITAL | Age: 77
End: 2022-12-24

## 2022-12-24 NOTE — PROGRESS NOTES
Patient has recently been discharged from hospitalization where he had bowel obstruction, ended up having a resection, I discussed his issues and concerns today with his wife. She reports that he did have a De La Cruz catheter during his admission for several days, it was removed and then he ended up having a bladder scan and a temporary catheter for several hours after that states that he had good urine output after that but since he has been home he has been having difficulty emptying his bladder, states that it takes about 15 minutes to get started, still feels that he has some urine present in the bladder after voiding. Has had the greatest relief when he uses the bathroom when he is laying completely flat, they have been using depends for this. Wife reports that he has been having bowel movements although small since being home, poor appetite. They have no urinal or any way to measure urine output. He is denying any pelvic discomfort even with palpation. He is not taking any other medications besides what is on his medication list and the hydrocodone that he was discharged with, he did have 1 tablet last night. He is wondering if he can double up on his Flomax he currently takes at 0.4 mg nightly to see if this will help. I have advised that I am okay with him doubling up, he may take 0.8 mg flomax nightly, however if he incurs any symptoms such as pelvic discomfort or any noted bladder distention or if urine output decreases he is to go to the ER for bladder scan and potential In-N-Out cath/De La Cruz insertion. He will be seen in the office early next week. They are aware that they can call the helpline if they have any further issues or concerns

## 2022-12-24 NOTE — TELEPHONE ENCOUNTER
Taking about 15 minutes to start urination stream. On 0.4 of Flomax    Called Paulina Lopez    Discussed situation     Will look at his chart and call him back directly     Updated home health nurse    Reason for Disposition  • [1] Caller has NON-URGENT medicine question about med that PCP prescribed AND [2] triager unable to answer question    Additional Information  • Negative: [1] Intentional drug overdose AND [2] suicidal thoughts or ideas  • Negative: Drug overdose and triager unable to answer question  • Negative: Caller requesting information unrelated to medicine  • Negative: Caller requesting information about COVID-19 Vaccine  • Negative: Caller requesting information about Emergency Contraception  • Negative: Caller requesting information about Combined Birth Control Pills  • Negative: Caller requesting information about Progestin Birth Control Pills  • Negative: Caller requesting information about Post-Op pain or medicines  • Negative: Caller requesting a prescription antibiotic (such as Penicillin) for Strep throat and has a positive culture result  • Negative: Caller requesting a prescription anti-viral med (such as Tamiflu) and has influenza (flu)  symptoms  • Negative: Immunization reaction suspected  • Negative: Rash while taking a medicine or within 3 days of stopping it  • Negative: [1] Asthma and [2] having symptoms of asthma (cough, wheezing, etc.)  • Negative: [1] Symptom of illness (e.g., headache, abdominal pain, earache, vomiting) AND [2] more than mild  • Negative: Breastfeeding questions about mother's medicines and diet  • Negative: MORE THAN A DOUBLE DOSE of a prescription or over-the-counter (OTC) drug  • Negative: [1] DOUBLE DOSE (an extra dose or lesser amount) of prescription drug AND [2] any symptoms (e.g., dizziness, nausea, pain, sleepiness)  • Negative: [1] DOUBLE DOSE (an extra dose or lesser amount) of over-the-counter (OTC) drug AND [2] any symptoms (e.g., dizziness,  "nausea, pain, sleepiness)  • Negative: Took another person's prescription drug  • Negative: [1] DOUBLE DOSE (an extra dose or lesser amount) of prescription drug AND [2] NO symptoms (Exception: a double dose of antibiotics)  • Negative: Diabetes drug error or overdose (e.g., took wrong type of insulin or took extra dose)  • Negative: [1] Prescription refill request for ESSENTIAL medicine (i.e., likelihood of harm to patient if not taken) AND [2] triager unable to refill per department policy  • Negative: [1] Prescription not at pharmacy AND [2] was prescribed by PCP recently (Exception: triager has access to EMR and prescription is recorded there. Go to Home Care and confirm for pharmacy.)  • Negative: [1] Pharmacy calling with prescription question AND [2] triager unable to answer question  • Negative: [1] Caller has URGENT medicine question about med that PCP or specialist prescribed AND [2] triager unable to answer question  • Negative: Medicine patch causing local rash or itching  • Negative: [1] Caller has medicine question about med NOT prescribed by PCP AND [2] triager unable to answer question (e.g., compatibility with other med, storage)  • Negative: Prescription request for new medicine (not a refill)  • Negative: Prescription refill request for a CONTROLLED substance (e.g., narcotics, ADHD medicines)  • Negative: [1] Prescription refill request for NON-ESSENTIAL medicine (i.e., no harm to patient if med not taken) AND [2] triager unable to refill per department policy    Answer Assessment - Initial Assessment Questions  1. NAME of MEDICATION: \"What medicine are you calling about?\"      Flomax 0.4 Q24  2. QUESTION: \"What is your question?\" (e.g., medication refill, side effect)      Can he increase dose?  3. PRESCRIBING HCP: \"Who prescribed it?\" Reason: if prescribed by specialist, call should be referred to that group.      Veronica  4. SYMPTOMS: \"Do you have any symptoms?\"      Taking longer to begin " "urine stream  5. SEVERITY: If symptoms are present, ask \"Are they mild, moderate or severe?\"      moderate  6. PREGNANCY:  \"Is there any chance that you are pregnant?\" \"When was your last menstrual period?\"      no    Protocols used: MEDICATION QUESTION CALL-ADULT-    "

## 2022-12-27 ENCOUNTER — TELEPHONE (OUTPATIENT)
Dept: INTERNAL MEDICINE | Facility: CLINIC | Age: 77
End: 2022-12-27

## 2022-12-27 ENCOUNTER — OFFICE VISIT (OUTPATIENT)
Dept: INTERNAL MEDICINE | Facility: CLINIC | Age: 77
End: 2022-12-27

## 2022-12-27 VITALS
DIASTOLIC BLOOD PRESSURE: 80 MMHG | HEIGHT: 73 IN | WEIGHT: 215 LBS | TEMPERATURE: 97.1 F | SYSTOLIC BLOOD PRESSURE: 136 MMHG | HEART RATE: 83 BPM | BODY MASS INDEX: 28.49 KG/M2 | OXYGEN SATURATION: 99 %

## 2022-12-27 DIAGNOSIS — R60.0 BILATERAL LOWER EXTREMITY EDEMA: Primary | ICD-10-CM

## 2022-12-27 DIAGNOSIS — K56.609 SBO (SMALL BOWEL OBSTRUCTION): ICD-10-CM

## 2022-12-27 DIAGNOSIS — R30.0 DYSURIA: ICD-10-CM

## 2022-12-27 DIAGNOSIS — R33.9 URINARY RETENTION: ICD-10-CM

## 2022-12-27 LAB
BILIRUB BLD-MCNC: NEGATIVE MG/DL
CLARITY, POC: CLEAR
COLOR UR: ABNORMAL
GLUCOSE UR STRIP-MCNC: NEGATIVE MG/DL
KETONES UR QL: NEGATIVE
LEUKOCYTE EST, POC: ABNORMAL
NITRITE UR-MCNC: NEGATIVE MG/ML
PH UR: 7 [PH] (ref 5–8)
PROT UR STRIP-MCNC: NEGATIVE MG/DL
RBC # UR STRIP: NEGATIVE /UL
SP GR UR: 1.02 (ref 1–1.03)
UROBILINOGEN UR QL: ABNORMAL

## 2022-12-27 PROCEDURE — 81003 URINALYSIS AUTO W/O SCOPE: CPT | Performed by: NURSE PRACTITIONER

## 2022-12-27 PROCEDURE — 1111F DSCHRG MED/CURRENT MED MERGE: CPT | Performed by: NURSE PRACTITIONER

## 2022-12-27 PROCEDURE — 99496 TRANSJ CARE MGMT HIGH F2F 7D: CPT | Performed by: NURSE PRACTITIONER

## 2022-12-27 RX ORDER — METRONIDAZOLE 500 MG/1
500 TABLET ORAL 3 TIMES DAILY
COMMUNITY
Start: 2022-12-22 | End: 2022-12-27

## 2022-12-27 NOTE — PROGRESS NOTES
Transitional Care Follow Up Visit    Merlin William Ihnen is a 77 y.o. male who presents for a transitional care management visit.    Patient was admitted at [facility].    Admission Date: 12/11/2022    Discharge Date: 12/22/2022    Within 48 business hours after discharge, patient was not contacted by either [facility] or [facility] nurse line.  He was advised at time of discharge to make a 1 week follow-up with primary care.     I reviewed and discussed the details of that call along with the discharge summary, hospital problems, inpatient lab results, inpatient diagnostic studies, and consultation reports with Merlin.     Current outpatient and discharge medications have been reconciled for the patient.  Reviewed by: MARIAN Valente      No flowsheet data found.    Course During Hospital Stay:    The patient presented to [facility] on 12/11/2022 with complaints of epigastric pain, nausea/vomiting and diarrhea.  CT of the abdomen pelvis was performed on admission which showed a small bowel obstruction.  The patient failed conservative measures with NG tube and bowel rest.  He was taken to the operating room on 12/14/2022 for exploratory laparotomy with omentectomy and lysis of adhesions.  The patient had complained of increased abdominal pain and tightness to the abdomen on 12/19.  A repeat CT scan was ordered to rule out possible abscess given elevation of white blood cell count. This did not show any acute findings.  The patient was ultimately discharged on 12/22/2022 to complete a course of oral Flagyl.    HPI:  The patient presents to the office today to follow-up on hospitalization as outlined above.  His wife is present with him today.  He indicates that since time of hospital discharge, he has had pain with urination and feelings of incomplete bladder emptying.  He did have a catheter for several days after surgery, and apparently had to have a temporary catheter for several  hours as he was having urinary retention in the hospital as well.  His wife indicates that he is standing up and sitting down to urinate and sometimes has to alternate between the 2.  The patient had discussed this with MARIAN Messina over the weekend and it was discussed that he would increase his Flomax to 0.8 mg daily.  The patient has done so and is unsure if this has really made a difference.  He is having difficulty getting his urine stream started, and again feels as though he is not able to empty his bladder completely.  He does feel as though he has had decreased urine output.  He has noted no blood in his urine.  He denies fever or chills.  He feels as though he is drinking fairly well, but does indicate a decreased appetite.  He indicates he is moving his bowels well.  His last bowel movement was yesterday.  He denies any nausea or vomiting.  He is having a little incisional discomfort, has only had to take 1 dose of pain medication since being discharged home.    His wife indicates that he has had some lower extremity edema, which is not normal for him.  He has been elevating his feet at times, although not above the level of the heart.    The following portions of the patient's history were reviewed and updated as appropriate: allergies, current medications, past family history, past medical history, past social history, past surgical history and problem list.    Objective     Past Medical History:   Diagnosis Date   • Atrial fibrillation (HCC)    • Cancer (HCC)    • Elevated liver enzymes    • Gastrointestinal stromal tumor (GIST) (HCC)    • GERD (gastroesophageal reflux disease)    • Pacemaker    • Squamous cell skin cancer       Past Surgical History:   Procedure Laterality Date   • COLON SURGERY     • COLONOSCOPY N/A 09/09/2021    Normal exam repeat in 5 years   • COLONOSCOPY W/ POLYPECTOMY  07/19/2016    Polyp at 70 cm proximal to anus no changes of adenomatous or hyperplastic polyp  identified repeat exam in 5 years   • COLONOSCOPY W/ POLYPECTOMY  04/19/2013    Early Tubular adenoma repeat exam in 3 years   • ENDOSCOPY  07/19/2016    Chronic esophagogastritis mild with Intestinal Metaplasia repeat exam in 3 years   • ENDOSCOPY  04/19/2013    Mild chronic inflammation negative for Intestinal Metaplasia    • ENDOSCOPY N/A 10/02/2019    Focal Intestinal Metaplasia repeat exam in 3 years   • ENDOSCOPY N/A 11/2/2022    Procedure: ESOPHAGOGASTRODUODENOSCOPY WITH ANESTHESIA;  Surgeon: Keyon Ann MD;  Location: [facility] ENDOSCOPY;  Service: Gastroenterology;  Laterality: N/A;  pre: barretts  post: barretts. gastric polyps.   Paulina Lopez C, APRN   • NECK SURGERY     • PACEMAKER IMPLANTATION     • SQUAMOUS CELL CARCINOMA EXCISION          Current Outpatient Medications:   •  Apoaequorin (Prevagen) 10 MG capsule, Take 1 capsule by mouth Daily., Disp: , Rfl:   •  aspirin 81 MG EC tablet, Take 81 mg by mouth Daily. Three times a week, Disp: , Rfl:   •  cetirizine (zyrTEC) 10 MG tablet, Take  by mouth., Disp: , Rfl:   •  Cholecalciferol (VITAMIN D3) 2000 UNITS capsule, Take  by mouth., Disp: , Rfl:   •  fluticasone (FLONASE) 50 MCG/ACT nasal spray, 1 spray into the nostril(s) as directed by provider Daily., Disp: , Rfl:   •  lisinopril (PRINIVIL,ZESTRIL) 10 MG tablet, Take 1 tablet by mouth Daily., Disp: 90 tablet, Rfl: 3  •  lovastatin (MEVACOR) 40 MG tablet, Take 1 tablet by mouth Every Night., Disp: 90 tablet, Rfl: 3  •  metroNIDAZOLE (FLAGYL) 500 MG tablet, Take 500 mg by mouth 3 (Three) Times a Day., Disp: , Rfl:   •  omeprazole (priLOSEC) 40 MG capsule, TAKE 1 CAPSULE EVERY DAY., Disp: 90 capsule, Rfl: 2  •  tamsulosin (FLOMAX) 0.4 MG capsule 24 hr capsule, TAKE 1 CAPSULE EVERY DAY, Disp: 90 capsule, Rfl: 3  •  therapeutic multivitamin-minerals (THERAGRAN-M) tablet, Take  by mouth., Disp: , Rfl:   •  triamcinolone (KENALOG) 0.1 % ointment, Apply 1 application topically to the appropriate area  as directed 2 (Two) Times a Day As Needed., Disp: , Rfl:      Vitals:    12/27/22 1307   BP: 136/80   Pulse: 83   Temp: 97.1 °F (36.2 °C)   SpO2: 99%         12/27/22  1307   Weight: 97.5 kg (215 lb)     Physical Exam  Vitals and nursing note reviewed.   Constitutional:       General: He is not in acute distress.     Appearance: He is not ill-appearing or toxic-appearing.   HENT:      Head: Normocephalic and atraumatic.      Mouth/Throat:      Mouth: Mucous membranes are moist.      Pharynx: Oropharynx is clear.   Cardiovascular:      Rate and Rhythm: Normal rate and regular rhythm.      Pulses: Normal pulses.      Heart sounds: Normal heart sounds.   Pulmonary:      Effort: Pulmonary effort is normal.      Breath sounds: No wheezing, rhonchi or rales.   Abdominal:      General: Bowel sounds are normal. There is no distension.      Palpations: Abdomen is soft.      Tenderness: There is abdominal tenderness.      Comments: Midline abdominal incision with staples intact. Abdominal binder in place. No drainage noted. Mild erythema surrounding incision, worst at the level of the umbilicus.  No induration, fluctuance, or warmth noted   Musculoskeletal:         General: No tenderness. Normal range of motion.      Cervical back: Normal range of motion and neck supple. No tenderness.      Right lower leg: Edema present.      Left lower leg: Edema present.   Skin:     General: Skin is warm and dry.      Findings: No erythema or rash.   Neurological:      General: No focal deficit present.      Mental Status: He is alert and oriented to person, place, and time.   Psychiatric:         Mood and Affect: Mood normal.         Behavior: Behavior normal.         Thought Content: Thought content normal.         Judgment: Judgment normal.         Assessment & Plan   Diagnoses and all orders for this visit:    1. Bilateral lower extremity edema (Primary)    2. SBO (small bowel obstruction) (HCC)  -     CBC & Differential  -      Comprehensive metabolic panel    3. Dysuria  -     POC Urinalysis Dipstick, Multipro  -     Urine Culture - Urine, Urine, Clean Catch    4. Urinary retention  -     POC Urinalysis Dipstick, Multipro  -     Urine Culture - Urine, Urine, Clean Catch       Patient presents for hospital follow-up.  He was hospitalized at [facility] from 12/11 through 12/22 with a small bowel obstruction requiring exploratory laparotomy/omentectomy and lysis of adhesions.  The patient has had some difficulty with dysuria and urinary retention since being discharged home.  He has increased his Flomax and has not noted much of a difference with this.  We did check a urinalysis in the office today which was positive for leukocytes.  We will send for culture but would not be inclined to treat for infection at this time.  Unfortunately, we do not have many options for bladder scanning postvoid residual in the outpatient setting so I have asked the patient that should his symptoms worsen or not improve he is to go to the emergency department for bladder scan and possible temporary De La Cruz catheter placement.  His bladder is nondistended on exam today and he does not seem to be having much discomfort.  I do feel that he likely still has postsurgical edema which is contributory to his symptoms.      Regarding his peripheral edema, advised the patient and his wife to elevate the lower extremities above the level of the heart when at rest and to use compression stockings throughout the day.  Patient has been instructed to increase fluid intake, as he has been drinking quite a bit of tea and not water.  I would like to reassess a CBC and CMP today.  His last CBC at the hospital on 12/22 did show improvement of leukocytosis, so would like to ensure that this is still trending in the right direction.  Last BMP on 12/22 did show mild hypokalemia with potassium level of 3.4 and mild hyponatremia with sodium level of 134.  Would like to reassess renal  function given urinary symptoms.  Patient will follow-up with surgeon as directed on 1/4.

## 2022-12-27 NOTE — TELEPHONE ENCOUNTER
Nancy OT, with [facility] called asking if it's ok she put in new orders. Today was suppose to be the eval. It's day 5 since order was put in. Wife asked to come back anther day since pt was here today and then has to go to pharmacy. Also, pt is tired. So please call and confirm ok for new order.

## 2022-12-28 ENCOUNTER — TELEPHONE (OUTPATIENT)
Dept: INTERNAL MEDICINE | Facility: CLINIC | Age: 77
End: 2022-12-28

## 2022-12-28 DIAGNOSIS — R33.9 URINARY RETENTION: Primary | ICD-10-CM

## 2022-12-28 NOTE — TELEPHONE ENCOUNTER
Mariana with [facility]  called asking for an order to d/c Covid precautions. He was positive 12/11/2022 and is not symptomatic.

## 2022-12-29 ENCOUNTER — TELEPHONE (OUTPATIENT)
Dept: INTERNAL MEDICINE | Facility: CLINIC | Age: 77
End: 2022-12-29

## 2022-12-29 ENCOUNTER — TELEPHONE (OUTPATIENT)
Dept: SURGERY | Age: 77
End: 2022-12-29

## 2022-12-29 LAB
ALBUMIN SERPL-MCNC: 3.3 G/DL (ref 3.5–5.2)
ALBUMIN/GLOB SERPL: 0.9 G/DL
ALP SERPL-CCNC: 59 U/L (ref 39–117)
ALT SERPL-CCNC: 16 U/L (ref 1–41)
AST SERPL-CCNC: 53 U/L (ref 1–40)
BACTERIA UR CULT: NO GROWTH
BACTERIA UR CULT: NORMAL
BASOPHILS # BLD AUTO: 0.05 10*3/MM3 (ref 0–0.2)
BASOPHILS NFR BLD AUTO: 0.5 % (ref 0–1.5)
BILIRUB SERPL-MCNC: 0.3 MG/DL (ref 0–1.2)
BUN SERPL-MCNC: 11 MG/DL (ref 8–23)
BUN/CREAT SERPL: 15.5 (ref 7–25)
CALCIUM SERPL-MCNC: 8.8 MG/DL (ref 8.6–10.5)
CHLORIDE SERPL-SCNC: 97 MMOL/L (ref 98–107)
CO2 SERPL-SCNC: 26.8 MMOL/L (ref 22–29)
CREAT SERPL-MCNC: 0.71 MG/DL (ref 0.76–1.27)
EGFRCR SERPLBLD CKD-EPI 2021: 93.3 ML/MIN/1.73
EOSINOPHIL # BLD AUTO: 0.17 10*3/MM3 (ref 0–0.4)
EOSINOPHIL NFR BLD AUTO: 1.6 % (ref 0.3–6.2)
ERYTHROCYTE [DISTWIDTH] IN BLOOD BY AUTOMATED COUNT: 12 % (ref 12.3–15.4)
GLOBULIN SER CALC-MCNC: 3.5 GM/DL
GLUCOSE SERPL-MCNC: 105 MG/DL (ref 65–99)
HCT VFR BLD AUTO: 34 % (ref 37.5–51)
HGB BLD-MCNC: 11.2 G/DL (ref 13–17.7)
IMM GRANULOCYTES # BLD AUTO: 0.11 10*3/MM3 (ref 0–0.05)
IMM GRANULOCYTES NFR BLD AUTO: 1.1 % (ref 0–0.5)
LYMPHOCYTES # BLD AUTO: 1.26 10*3/MM3 (ref 0.7–3.1)
LYMPHOCYTES NFR BLD AUTO: 12.2 % (ref 19.6–45.3)
MCH RBC QN AUTO: 32.3 PG (ref 26.6–33)
MCHC RBC AUTO-ENTMCNC: 32.9 G/DL (ref 31.5–35.7)
MCV RBC AUTO: 98 FL (ref 79–97)
MONOCYTES # BLD AUTO: 0.78 10*3/MM3 (ref 0.1–0.9)
MONOCYTES NFR BLD AUTO: 7.6 % (ref 5–12)
NEUTROPHILS # BLD AUTO: 7.95 10*3/MM3 (ref 1.7–7)
NEUTROPHILS NFR BLD AUTO: 77 % (ref 42.7–76)
NRBC BLD AUTO-RTO: 0.1 /100 WBC (ref 0–0.2)
PLATELET # BLD AUTO: 451 10*3/MM3 (ref 140–450)
POTASSIUM SERPL-SCNC: 3.9 MMOL/L (ref 3.5–5.2)
PROT SERPL-MCNC: 6.8 G/DL (ref 6–8.5)
RBC # BLD AUTO: 3.47 10*6/MM3 (ref 4.14–5.8)
SODIUM SERPL-SCNC: 133 MMOL/L (ref 136–145)
WBC # BLD AUTO: 10.32 10*3/MM3 (ref 3.4–10.8)

## 2022-12-29 NOTE — TELEPHONE ENCOUNTER
CINTIA FROM [facility]   EVALUATED PATIENT THIS MORNING HE DOES NOT NEED ANY FURTHER OCCUPATIONAL THERAPY SERVICES     GOOD CALL BACK   [phone]

## 2022-12-29 NOTE — PROGRESS NOTES
White blood cell count is stable and within normal range.  Anemia appears stable.  Sodium and chloride levels are slightly low, indicating perhaps mild dehydration.  Encourage at least 48 oz of water per day with 8 oz glass of pedialyte per day for the next 2-3 days.

## 2022-12-29 NOTE — TELEPHONE ENCOUNTER
12/29/22 Terry Solano with [facility] called and asked did you want them to remove staples or leave in until patient sees Dr Rosy Michael on 1/4/23. Can return call or send order for removal by fax.     Contact # [phone]  Fax # [phone]  arlin

## 2022-12-29 NOTE — TELEPHONE ENCOUNTER
Caller: ISAI    Relationship: [facility]    Best call back number: [phone]    What is the best time to reach you: ANY    Who are you requesting to speak with (clinical staff, provider,  specific staff member): CLINICAL    What was the call regarding:     ISAI FROM [facility] WOULD LIKE TO KNOW IF IT IS OKAY TO TAKE ORDERS FROM DR. JAMES PEARCE?    Do you require a callback: YES

## 2022-12-30 ENCOUNTER — TELEPHONE (OUTPATIENT)
Dept: INTERNAL MEDICINE | Facility: CLINIC | Age: 77
End: 2022-12-30
Payer: MEDICARE

## 2022-12-30 NOTE — TELEPHONE ENCOUNTER
Wife called asking about a referral for urology for . There is no verbal for this office that I see.

## 2023-01-03 ENCOUNTER — TELEPHONE (OUTPATIENT)
Dept: INTERNAL MEDICINE | Facility: CLINIC | Age: 78
End: 2023-01-03
Payer: MEDICARE

## 2023-01-03 DIAGNOSIS — N40.1 BENIGN PROSTATIC HYPERPLASIA WITH INCOMPLETE BLADDER EMPTYING: Primary | ICD-10-CM

## 2023-01-03 DIAGNOSIS — R39.14 BENIGN PROSTATIC HYPERPLASIA WITH INCOMPLETE BLADDER EMPTYING: Primary | ICD-10-CM

## 2023-01-03 NOTE — TELEPHONE ENCOUNTER
Patient wife called stating he was seen here last week for his urine problem and now she is wanting to see Urology again.  she called to get him an appointment but it has been too long since he was seen so is needing a referral.  Dr Carrasco is the one he saw in the past

## 2023-01-04 ENCOUNTER — OFFICE VISIT (OUTPATIENT)
Dept: SURGERY | Age: 78
End: 2023-01-04

## 2023-01-04 VITALS
TEMPERATURE: 96.9 F | OXYGEN SATURATION: 97 % | HEART RATE: 88 BPM | WEIGHT: 201 LBS | BODY MASS INDEX: 26.64 KG/M2 | HEIGHT: 73 IN

## 2023-01-04 DIAGNOSIS — Z09 POSTOPERATIVE EXAMINATION: Primary | ICD-10-CM

## 2023-01-04 PROCEDURE — 99024 POSTOP FOLLOW-UP VISIT: CPT | Performed by: SURGERY

## 2023-01-04 NOTE — PROGRESS NOTES
Postop Progress Note    Subjective    Merlin Jhonny Daisy presents to the office for postop follow up 3 weeks s/p exploratory laparotomy and small bowel resection with lysis of adhesions for a bowel obstruction. He is doing well. He is working with home health and PT and improved his mobility significantly. No dietary issues. Daily bowel movements. He is having some urinary frequency, and waiting to see urology. Objective    Vitals:    01/04/23 1053   Pulse: 88   Temp: 96.9 °F (36.1 °C)   SpO2: 97%     General: alert, cooperative and no distress  Incision: healing well    Assessment  Doing well postoperatively. Plan  1. Continue any current medications  2. Wound care discussed  3. Pt is to increase activities as tolerated  4. Usual diet  5. Follow up: in one month.     Electronically signed by Brayan Pérez DO on 5/1/5874 at 11:18 AM

## 2023-01-10 ENCOUNTER — OFFICE VISIT (OUTPATIENT)
Dept: INTERNAL MEDICINE | Facility: CLINIC | Age: 78
End: 2023-01-10
Payer: MEDICARE

## 2023-01-10 VITALS
TEMPERATURE: 96.9 F | HEIGHT: 73 IN | WEIGHT: 205.2 LBS | BODY MASS INDEX: 27.2 KG/M2 | SYSTOLIC BLOOD PRESSURE: 132 MMHG | OXYGEN SATURATION: 98 % | HEART RATE: 71 BPM | DIASTOLIC BLOOD PRESSURE: 72 MMHG

## 2023-01-10 DIAGNOSIS — R33.9 URINARY RETENTION: Primary | ICD-10-CM

## 2023-01-10 DIAGNOSIS — R60.0 BILATERAL LOWER EXTREMITY EDEMA: ICD-10-CM

## 2023-01-10 PROCEDURE — 99213 OFFICE O/P EST LOW 20 MIN: CPT

## 2023-01-10 NOTE — PROGRESS NOTES
Subjective   Merlin William Ihnen is a 77 y.o. male.   Chief Complaint   Patient presents with   • Bilateral Extremity Edema     2 week follow up - cleared   • Urinary Retention     Cleared - has appointment with Urology   • Medication Review     Zyrtec and Prevagen        History of Present Illness   Merlin presents here today with his wife for a 2-week follow-up on bilateral extremity edema, urinary retention and they would also like to review medications Zyrtec and Prevagen  He reports that he continues to get stronger, he has been doing the exercises as he has been instructed to do and has been walking 20 minutes a day.  He reports that his stools continue to be more formed and he has been having regular movements without blood or discomfort.  He denies any abdominal pain, states he has not had to take any pain medication except for 1 tablet since discharge.  He reports that the sensation of urinary retention has improved significantly however he is still getting up around 3-4 times at night to void, before surgery he feels that his average awakening was 2 times nightly.  He states that the edema in his lower extremities has improved.  He has been incorporating the electrolyte drinks as he has been instructed to do and has increased his water intake, he states that previously he was likely drinking 20 ounces of water daily but he has increased this to 40 ounces.  He reports that his blood pressures have been running normally at home, reports good appetite.  His wife is wondering if he needs to continue with the discontinuation of the Zyrtec and the Prevagen, reports that on discharge summary it was noted not to take these 2 medications.  He states he has been taking Flonase since discharge and this is actually been working better for his sinus congestion than the Zyrtec ever did.  He denies ever really noting an improvement in cognitive function with the use of Prevagen.  The following portions of the patient's  history were reviewed and updated as appropriate: allergies, current medications, past family history, past medical history, past social history, past surgical history and problem list.    Review of Systems    Objective   Past Medical History:   Diagnosis Date   • Atrial fibrillation (HCC)    • Cancer (HCC)    • Elevated liver enzymes    • Gastrointestinal stromal tumor (GIST) (HCC)    • GERD (gastroesophageal reflux disease)    • Pacemaker    • Squamous cell skin cancer       Past Surgical History:   Procedure Laterality Date   • COLON SURGERY     • COLONOSCOPY N/A 09/09/2021    Normal exam repeat in 5 years   • COLONOSCOPY W/ POLYPECTOMY  07/19/2016    Polyp at 70 cm proximal to anus no changes of adenomatous or hyperplastic polyp identified repeat exam in 5 years   • COLONOSCOPY W/ POLYPECTOMY  04/19/2013    Early Tubular adenoma repeat exam in 3 years   • ENDOSCOPY  07/19/2016    Chronic esophagogastritis mild with Intestinal Metaplasia repeat exam in 3 years   • ENDOSCOPY  04/19/2013    Mild chronic inflammation negative for Intestinal Metaplasia    • ENDOSCOPY N/A 10/02/2019    Focal Intestinal Metaplasia repeat exam in 3 years   • ENDOSCOPY N/A 11/2/2022    Procedure: ESOPHAGOGASTRODUODENOSCOPY WITH ANESTHESIA;  Surgeon: Keyon Ann MD;  Location: [facility] ENDOSCOPY;  Service: Gastroenterology;  Laterality: N/A;  pre: barretts  post: barretts. gastric polyps.   Paulina Lopez, APRN   • NECK SURGERY     • PACEMAKER IMPLANTATION     • SQUAMOUS CELL CARCINOMA EXCISION          Current Outpatient Medications:   •  aspirin 81 MG EC tablet, Take 81 mg by mouth Daily. Three times a week, Disp: , Rfl:   •  Cholecalciferol (VITAMIN D3) 2000 UNITS capsule, Take  by mouth., Disp: , Rfl:   •  fluticasone (FLONASE) 50 MCG/ACT nasal spray, 1 spray into the nostril(s) as directed by provider Daily., Disp: , Rfl:   •  lisinopril (PRINIVIL,ZESTRIL) 10 MG tablet, Take 1 tablet by mouth Daily., Disp: 90 tablet, Rfl:  3  •  lovastatin (MEVACOR) 40 MG tablet, Take 1 tablet by mouth Every Night., Disp: 90 tablet, Rfl: 3  •  omeprazole (priLOSEC) 40 MG capsule, TAKE 1 CAPSULE EVERY DAY., Disp: 90 capsule, Rfl: 2  •  tamsulosin (FLOMAX) 0.4 MG capsule 24 hr capsule, TAKE 1 CAPSULE EVERY DAY, Disp: 90 capsule, Rfl: 3  •  therapeutic multivitamin-minerals (THERAGRAN-M) tablet, Take  by mouth., Disp: , Rfl:   •  triamcinolone (KENALOG) 0.1 % ointment, Apply 1 application topically to the appropriate area as directed 2 (Two) Times a Day As Needed., Disp: , Rfl:       /72 (BP Location: Left arm, Patient Position: Sitting, Cuff Size: Adult)   Pulse 71   Temp 96.9 °F (36.1 °C) (Temporal)   Ht 185.4 cm (73\")   Wt 93.1 kg (205 lb 3.2 oz)   SpO2 98%   BMI 27.07 kg/m²      Body mass index is 27.07 kg/m².         Physical Exam  Vitals and nursing note reviewed.   Constitutional:       Appearance: Normal appearance. He is obese.   HENT:      Head: Normocephalic and atraumatic.   Eyes:      Pupils: Pupils are equal, round, and reactive to light.   Abdominal:      General: Bowel sounds are normal. There is distension.      Tenderness: There is no abdominal tenderness. There is no guarding.      Comments: Horizontal abdominal incision appears to be healing well, some dry skin in the area as needed, he reports it is also itchy   Musculoskeletal:         General: Normal range of motion.      Cervical back: Normal range of motion.      Right lower leg: No edema.      Left lower leg: No edema.   Skin:     General: Skin is warm and dry.   Neurological:      General: No focal deficit present.      Mental Status: He is alert and oriented to person, place, and time. Mental status is at baseline.   Psychiatric:         Mood and Affect: Mood normal.         Behavior: Behavior normal.         Thought Content: Thought content normal.         Judgment: Judgment normal.               Assessment & Plan   Diagnoses and all orders for this visit:    1.  Urinary retention (Primary)    2. Bilateral lower extremity edema               Plan of care reviewed with Tamiko and his wife  He appears to continue to be recovering well, has been incorporating 8 ounces of electrolyte drink daily, I advised he continue to do so, upon review of lab work from previous years he has had chronic slight hyponatremia.  Symptoms of urinary retention are improving based on his report, he will continue with tamsulosin at 0.8 mg daily for now, has an upcoming appointment urology and they will manage from thereon out.  Bilateral lower extremity edema has resolved, advised to continue with activity as he has been.  He has lost about 10 pounds since his last visit here, he feels a lot this is fluid, I did encourage him to weigh at least once weekly and to notify us if he is having consistent weight loss.  For now may follow-up in 3 months with the understanding that he can always be seen before this as needed

## 2023-01-12 ENCOUNTER — TELEPHONE (OUTPATIENT)
Dept: CARDIOLOGY CLINIC | Age: 78
End: 2023-01-12

## 2023-01-13 PROCEDURE — 93294 REM INTERROG EVL PM/LDLS PM: CPT | Performed by: CLINICAL NURSE SPECIALIST

## 2023-01-13 PROCEDURE — 93296 REM INTERROG EVL PM/IDS: CPT | Performed by: CLINICAL NURSE SPECIALIST

## 2023-01-13 NOTE — PROGRESS NOTES
Subjective    Mr. Eugene is 77 y.o. male    Chief Complaint: BPH with Urinary Retention    History of Present Illness  Patient referred for BPH. Patient reports nocturia X 3, frequency.  AUA 4/35.  Patient maintained on Flomax 0.8.   Patient previously followed by [facility] for BPH/ED.      The following portions of the patient's history were reviewed and updated as appropriate: allergies, current medications, past family history, past medical history, past social history, past surgical history and problem list.    Review of Systems   Constitutional: Negative for chills and fever.   Gastrointestinal: Negative for abdominal pain, anal bleeding and blood in stool.   Genitourinary: Positive for frequency. Negative for difficulty urinating, dysuria, hematuria and urgency.         Current Outpatient Medications:   •  aspirin 81 MG EC tablet, Take 81 mg by mouth Daily. Three times a week, Disp: , Rfl:   •  Cholecalciferol (VITAMIN D3) 2000 UNITS capsule, Take  by mouth., Disp: , Rfl:   •  fluticasone (FLONASE) 50 MCG/ACT nasal spray, 1 spray into the nostril(s) as directed by provider Daily., Disp: , Rfl:   •  lisinopril (PRINIVIL,ZESTRIL) 10 MG tablet, Take 1 tablet by mouth Daily., Disp: 90 tablet, Rfl: 3  •  lovastatin (MEVACOR) 40 MG tablet, Take 1 tablet by mouth Every Night., Disp: 90 tablet, Rfl: 3  •  omeprazole (priLOSEC) 40 MG capsule, TAKE 1 CAPSULE EVERY DAY., Disp: 90 capsule, Rfl: 2  •  tamsulosin (FLOMAX) 0.4 MG capsule 24 hr capsule, TAKE 1 CAPSULE EVERY DAY (Patient taking differently: Take 0.8 mg by mouth Daily.), Disp: 90 capsule, Rfl: 3  •  therapeutic multivitamin-minerals (THERAGRAN-M) tablet, Take  by mouth., Disp: , Rfl:   •  triamcinolone (KENALOG) 0.1 % ointment, Apply 1 application topically to the appropriate area as directed 2 (Two) Times a Day As Needed., Disp: , Rfl:     Past Medical History:   Diagnosis Date   • Atrial fibrillation (HCC)    • Cancer (HCC)    • Elevated liver enzymes    •  "Gastrointestinal stromal tumor (GIST) (HCC)    • GERD (gastroesophageal reflux disease)    • Pacemaker    • Squamous cell skin cancer        Past Surgical History:   Procedure Laterality Date   • COLON SURGERY     • COLONOSCOPY N/A 09/09/2021    Normal exam repeat in 5 years   • COLONOSCOPY W/ POLYPECTOMY  07/19/2016    Polyp at 70 cm proximal to anus no changes of adenomatous or hyperplastic polyp identified repeat exam in 5 years   • COLONOSCOPY W/ POLYPECTOMY  04/19/2013    Early Tubular adenoma repeat exam in 3 years   • ENDOSCOPY  07/19/2016    Chronic esophagogastritis mild with Intestinal Metaplasia repeat exam in 3 years   • ENDOSCOPY  04/19/2013    Mild chronic inflammation negative for Intestinal Metaplasia    • ENDOSCOPY N/A 10/02/2019    Focal Intestinal Metaplasia repeat exam in 3 years   • ENDOSCOPY N/A 11/2/2022    Procedure: ESOPHAGOGASTRODUODENOSCOPY WITH ANESTHESIA;  Surgeon: Keyon Ann MD;  Location: [facility] ENDOSCOPY;  Service: Gastroenterology;  Laterality: N/A;  pre: barretts  post: barretts. gastric polyps.   Paulina Lopez, APRN   • NECK SURGERY     • PACEMAKER IMPLANTATION     • SQUAMOUS CELL CARCINOMA EXCISION         Social History     Socioeconomic History   • Marital status:    Tobacco Use   • Smoking status: Former     Packs/day: 0.50     Years: 10.00     Pack years: 5.00     Types: Cigarettes   • Smokeless tobacco: Never   • Tobacco comments:     quit over 50 yrs ago   Vaping Use   • Vaping Use: Never used   Substance and Sexual Activity   • Alcohol use: Yes     Comment: 4-5 times weekly beer   • Drug use: No   • Sexual activity: Yes     Partners: Female       Family History   Problem Relation Age of Onset   • Heart disease Mother    • No Known Problems Father    • Colon polyps Neg Hx    • Colon cancer Neg Hx        Objective    Temp 98 °F (36.7 °C)   Ht 185.4 cm (73\")   Wt 93.4 kg (205 lb 12.8 oz)   BMI 27.15 kg/m²     Physical Exam    CT independent reivew    The " CT scan of the abdomen/pelvis done with and without contrast is available for me to review.  Treatment recommendations require an independent review.  First I scanned the liver, spleen, and bowel pattern.  The retroperitoneum including the major vessels and lymphatic packages are briefly reviewed.  This film as been reviewed by the radiologist to determine any non urologic abnormalities that are present.  The kidneys are closely inspected for size, symmetry, contour, parenchymal thickness, perinephric reaction, presence of calcifications, and intrarenal dilation of the collecting system.  The ureters are inspected for their course, caliber, and any calcifications.  The bladder is inspected for its thickness, size, and presence of any calcifications.  This scan shows:    The right kidney appears normal on this contrasted CT scan.  The renal parenchymal is normal in thickness.  There are no solid masses or cysts.  There is no hydronephrosis.  There are no stones.      The left kidney appears normal on this contrasted CT scan.  The renal parenchymal is normal in thickness.  There are no solid masses or cysts.  There is no hydronephrosis.  There are no stones.      The bladder appears enlarged bladder with enlarged prostate.         Results for orders placed or performed in visit on 01/18/23   POC Urinalysis Dipstick, Multipro    Specimen: Urine   Result Value Ref Range    Color Yellow Yellow, Straw, Dark Yellow, Umm    Clarity, UA Clear Clear    Glucose, UA Negative Negative mg/dL    Bilirubin Negative Negative    Ketones, UA Negative Negative    Specific Gravity  1.020 1.005 - 1.030    Blood, UA Negative Negative    pH, Urine 6.0 5.0 - 8.0    Protein, POC Negative Negative mg/dL    Urobilinogen, UA 0.2 E.U./dL Normal, 0.2 E.U./dL    Nitrite, UA Negative Negative    Leukocytes Negative Negative         IPSS Questionnaire (AUA-7):  Incomplete emptying  Over the past month, how often have you had a sensation of not  emptying your bladder completely after you finish?: Not at all (01/18/23 1318)  Frequency  Over the past month, how often have you had to urinate again less than two hours after you finishing urinating ?: Less than 1 time in 5 (01/18/23 1318)  Intermittency  Over the past month, how often have you found you stopped and started again several time when you urinated ?: Not at all (01/18/23 1318)  Urgency  Over the last month, how difficult  have you found it to postpone urination ?: Not at all (01/18/23 1318)  Weak Stream  Over the past month, how often have you had a weak urinary stream ?: Not at all (01/18/23 1318)  Straining  Over the past month, how often have you had to push or strain to begin urination ?: Not at all (01/18/23 1318)  Nocturia  Over the past month, how many times did you most typically get up to urinate from the time you went to bed until the time you got up in the morning ?: About half the time (01/18/23 1318)  Quality of life due to urinary symptoms  If you were to spend the rest of your life with your urinary condition the way it is now, how would feel about that?: Mostly Satisfied (01/18/23 1318)    Scores  Total IPSS Score: (!) 4 (01/18/23 1318)  Total Score = Symtomatic Level: Mildly symptomatic: 0-7 (01/18/23 1318)        Assessment and Plan    Diagnoses and all orders for this visit:    1. Benign prostatic hyperplasia with urinary retention (Primary)  -     POC Urinalysis Dipstick, Multipro      Reviewed records summarized in HPI.  I am going to decrease his Flomax to 0.4.  He did not notice a difference on the 0.8 dose.  His AUA symptom score is 4/35 I recommend no surgical intervention at this point.  He will follow-up in 1 year with Franck Manrique.

## 2023-01-16 DIAGNOSIS — Z95.0 PACEMAKER: Primary | ICD-10-CM

## 2023-01-16 DIAGNOSIS — R00.1 BRADYCARDIA: ICD-10-CM

## 2023-01-18 ENCOUNTER — OFFICE VISIT (OUTPATIENT)
Dept: UROLOGY | Facility: CLINIC | Age: 78
End: 2023-01-18
Payer: MEDICARE

## 2023-01-18 VITALS — TEMPERATURE: 98 F | HEIGHT: 73 IN | BODY MASS INDEX: 27.28 KG/M2 | WEIGHT: 205.8 LBS

## 2023-01-18 DIAGNOSIS — R33.8 BENIGN PROSTATIC HYPERPLASIA WITH URINARY RETENTION: Primary | ICD-10-CM

## 2023-01-18 DIAGNOSIS — N40.1 BENIGN PROSTATIC HYPERPLASIA WITH URINARY RETENTION: Primary | ICD-10-CM

## 2023-01-18 DIAGNOSIS — N52.9 IMPOTENCE OF ORGANIC ORIGIN: ICD-10-CM

## 2023-01-18 LAB
BILIRUB BLD-MCNC: NEGATIVE MG/DL
CLARITY, POC: CLEAR
COLOR UR: YELLOW
GLUCOSE UR STRIP-MCNC: NEGATIVE MG/DL
KETONES UR QL: NEGATIVE
LEUKOCYTE EST, POC: NEGATIVE
NITRITE UR-MCNC: NEGATIVE MG/ML
PH UR: 6 [PH] (ref 5–8)
PROT UR STRIP-MCNC: NEGATIVE MG/DL
RBC # UR STRIP: NEGATIVE /UL
SP GR UR: 1.02 (ref 1–1.03)
UROBILINOGEN UR QL: NORMAL

## 2023-01-18 PROCEDURE — 81001 URINALYSIS AUTO W/SCOPE: CPT | Performed by: UROLOGY

## 2023-01-18 PROCEDURE — 99204 OFFICE O/P NEW MOD 45 MIN: CPT | Performed by: UROLOGY

## 2023-01-18 RX ORDER — TAMSULOSIN HYDROCHLORIDE 0.4 MG/1
1 CAPSULE ORAL NIGHTLY
Qty: 90 CAPSULE | Refills: 3 | Status: SHIPPED | OUTPATIENT
Start: 2023-01-18

## 2023-02-07 ENCOUNTER — OFFICE VISIT (OUTPATIENT)
Dept: SURGERY | Age: 78
End: 2023-02-07

## 2023-02-07 VITALS
DIASTOLIC BLOOD PRESSURE: 74 MMHG | TEMPERATURE: 97.2 F | WEIGHT: 208.8 LBS | BODY MASS INDEX: 27.67 KG/M2 | SYSTOLIC BLOOD PRESSURE: 138 MMHG | HEIGHT: 73 IN

## 2023-02-07 DIAGNOSIS — Z09 POSTOPERATIVE EXAMINATION: Primary | ICD-10-CM

## 2023-02-07 PROCEDURE — 99024 POSTOP FOLLOW-UP VISIT: CPT | Performed by: SURGERY

## 2023-02-07 NOTE — PROGRESS NOTES
Postop Progress Note    Subjective    Merlin Venda Chew presents to the office for postop follow up nearly two months s/p exploratory laparotomy for small bowel obstruction. He is doing very well. He is back to his normal activities and having normal bowel movements and tolerating his diet without issue. Objective    Vitals:    02/07/23 1108   BP: 138/74   Temp: 97.2 °F (36.2 °C)     General: alert, cooperative and no distress  Incision: healing well    Assessment  Doing well postoperatively. Plan  1. Continue any current medications  2. Wound care discussed  3. Pt is to increase activities as tolerated  4. Usual diet  5.  Follow up: as needed    Electronically signed by Henri Martin DO on 3/3/8537 at 11:13 AM

## 2023-03-22 RX ORDER — FLUTICASONE PROPIONATE 50 MCG
1 SPRAY, SUSPENSION (ML) NASAL DAILY
Qty: 16 G | Refills: 2 | Status: SHIPPED | OUTPATIENT
Start: 2023-03-22

## 2023-03-22 RX ORDER — LOVASTATIN 40 MG/1
TABLET ORAL
Qty: 90 TABLET | Refills: 3 | Status: SHIPPED | OUTPATIENT
Start: 2023-03-22

## 2023-03-22 NOTE — TELEPHONE ENCOUNTER
Caller: NURY MCCULLOUGH     Relationship: [employer]     Best call back number: [phone] (Mobile)    Requested Prescriptions: 3 MONTH SUPPLY       fluticasone (FLONASE) 50 MCG/ACT nasal spray  1 spray, Daily         Pharmacy where request should be sent:    [facility] - [facility] [address] - [phone] PH - [phone] FX  [phone]  Last office visit with prescribing clinician: 1/10/2023   Last telemedicine visit with prescribing clinician: 4/13/2023   Next office visit with prescribing clinician: 4/13/2023     Additional details provided by patient: STATES HE WAS GIVEN THE NOSE SPRAY AWHILE BACK AND WOULD LIKE TO CONTINUE USING IT     Does the patient have less than a 3 day supply:  [x] Yes  [] No    Would you like a call back once the refill request has been completed: [x] Yes [] No    If the office needs to give you a call back, can they leave a voicemail: [x] Yes [] No    Phyllis Kohler Rep   03/22/23 09:25 CDT

## 2023-04-13 ENCOUNTER — OFFICE VISIT (OUTPATIENT)
Dept: INTERNAL MEDICINE | Facility: CLINIC | Age: 78
End: 2023-04-13
Payer: MEDICARE

## 2023-04-13 VITALS
HEIGHT: 73 IN | DIASTOLIC BLOOD PRESSURE: 82 MMHG | BODY MASS INDEX: 27.75 KG/M2 | WEIGHT: 209.4 LBS | HEART RATE: 66 BPM | TEMPERATURE: 97.5 F | OXYGEN SATURATION: 94 % | SYSTOLIC BLOOD PRESSURE: 128 MMHG

## 2023-04-13 DIAGNOSIS — I10 HTN (HYPERTENSION), BENIGN: Primary | ICD-10-CM

## 2023-04-13 PROCEDURE — 1159F MED LIST DOCD IN RCRD: CPT

## 2023-04-13 PROCEDURE — 3079F DIAST BP 80-89 MM HG: CPT

## 2023-04-13 PROCEDURE — 1160F RVW MEDS BY RX/DR IN RCRD: CPT

## 2023-04-13 PROCEDURE — 99213 OFFICE O/P EST LOW 20 MIN: CPT

## 2023-04-13 PROCEDURE — 3074F SYST BP LT 130 MM HG: CPT

## 2023-04-13 NOTE — PROGRESS NOTES
Subjective   Merlin William Ihnen is a 78 y.o. male.   Chief Complaint   Patient presents with   • Hypertension     3 month f/u   • Prediabetes     Last A1c done 11/07/2022; 5.50%       History of Present Illness   Merlin presents here today for follow-up on hypertension.  He reports that he has been doing well, has no acute complaints or concerns today.  He reports he continues to follow with Dr. Handley, states that his Flomax was decreased to 0.4 mg he has been taking it nightly, he feels that the effects are therapeutic and he denies any negative side effects related to the medication such as dizziness.  He reports he gets up 1-2 times nightly to use the bathroom.  He reports that he has been tolerating doing outdoor work without any shortness of breath or  chest pain.  He reports compliance with all of his medications.      The following portions of the patient's history were reviewed and updated as appropriate: allergies, current medications, past family history, past medical history, past social history, past surgical history and problem list.    Review of Systems    Objective   Past Medical History:   Diagnosis Date   • Atrial fibrillation    • Cancer    • Elevated liver enzymes    • Gastrointestinal stromal tumor (GIST)    • GERD (gastroesophageal reflux disease)    • Pacemaker    • Squamous cell skin cancer       Past Surgical History:   Procedure Laterality Date   • COLON SURGERY     • COLONOSCOPY N/A 09/09/2021    Normal exam repeat in 5 years   • COLONOSCOPY W/ POLYPECTOMY  07/19/2016    Polyp at 70 cm proximal to anus no changes of adenomatous or hyperplastic polyp identified repeat exam in 5 years   • COLONOSCOPY W/ POLYPECTOMY  04/19/2013    Early Tubular adenoma repeat exam in 3 years   • ENDOSCOPY  07/19/2016    Chronic esophagogastritis mild with Intestinal Metaplasia repeat exam in 3 years   • ENDOSCOPY  04/19/2013    Mild chronic inflammation negative for Intestinal Metaplasia    • ENDOSCOPY N/A  "10/02/2019    Focal Intestinal Metaplasia repeat exam in 3 years   • ENDOSCOPY N/A 11/2/2022    Procedure: ESOPHAGOGASTRODUODENOSCOPY WITH ANESTHESIA;  Surgeon: Keyon Ann MD;  Location: [facility] ENDOSCOPY;  Service: Gastroenterology;  Laterality: N/A;  pre: barretts  post: barretts. gastric polyps.   Paulina Lopez, APRN   • NECK SURGERY     • PACEMAKER IMPLANTATION     • SQUAMOUS CELL CARCINOMA EXCISION          Current Outpatient Medications:   •  aspirin 81 MG EC tablet, Take 1 tablet by mouth Daily. Three times a week, Disp: , Rfl:   •  Cholecalciferol (VITAMIN D3) 2000 UNITS capsule, Take  by mouth., Disp: , Rfl:   •  fluticasone (FLONASE) 50 MCG/ACT nasal spray, 1 spray into the nostril(s) as directed by provider Daily., Disp: 16 g, Rfl: 2  •  lisinopril (PRINIVIL,ZESTRIL) 10 MG tablet, Take 1 tablet by mouth Daily., Disp: 90 tablet, Rfl: 3  •  lovastatin (MEVACOR) 40 MG tablet, TAKE 1 TABLET EVERY NIGHT, Disp: 90 tablet, Rfl: 3  •  omeprazole (priLOSEC) 40 MG capsule, TAKE 1 CAPSULE EVERY DAY., Disp: 90 capsule, Rfl: 2  •  tamsulosin (FLOMAX) 0.4 MG capsule 24 hr capsule, Take 1 capsule by mouth Every Night., Disp: 90 capsule, Rfl: 3  •  therapeutic multivitamin-minerals (THERAGRAN-M) tablet, Take  by mouth., Disp: , Rfl:   •  triamcinolone (KENALOG) 0.1 % ointment, Apply 1 application topically to the appropriate area as directed 2 (Two) Times a Day As Needed. (Patient not taking: Reported on 4/13/2023), Disp: , Rfl:       /82 (BP Location: Right arm, Patient Position: Sitting, Cuff Size: Adult)   Pulse 66   Temp 97.5 °F (36.4 °C) (Temporal)   Ht 185.4 cm (73\")   Wt 95 kg (209 lb 6.4 oz)   SpO2 94%   BMI 27.63 kg/m²      Body mass index is 27.63 kg/m².         Physical Exam  Vitals and nursing note reviewed.   Constitutional:       General: He is not in acute distress.     Appearance: Normal appearance. He is not ill-appearing, toxic-appearing or diaphoretic.   HENT:      Head: " Normocephalic and atraumatic.   Eyes:      Extraocular Movements: Extraocular movements intact.      Conjunctiva/sclera: Conjunctivae normal.      Pupils: Pupils are equal, round, and reactive to light.   Cardiovascular:      Rate and Rhythm: Normal rate and regular rhythm.      Pulses: Normal pulses.      Heart sounds: Normal heart sounds.   Pulmonary:      Effort: Pulmonary effort is normal.      Breath sounds: Normal breath sounds.   Abdominal:      General: Bowel sounds are normal.      Palpations: Abdomen is soft.   Musculoskeletal:         General: Normal range of motion.      Cervical back: Normal range of motion and neck supple.   Skin:     General: Skin is warm and dry.      Capillary Refill: Capillary refill takes less than 2 seconds.   Neurological:      General: No focal deficit present.      Mental Status: He is alert and oriented to person, place, and time. Mental status is at baseline.   Psychiatric:         Mood and Affect: Mood normal.         Behavior: Behavior normal.         Thought Content: Thought content normal.         Judgment: Judgment normal.               Assessment & Plan   Diagnoses and all orders for this visit:    1. HTN (hypertension), benign (Primary)  -     Basic metabolic panel                 Plan of care reviewed with Mr. Eugene  Blood pressure today is 128/82, he has not been routinely assessing blood pressures at home, I advised at least twice monthly and more often if indicated with abnormal readings.  For now he will continue with current treatment of lisinopril 10 mg daily, we will go ahead and assess a BMP today, if unremarkable we will have him follow-up around 6 months for Medicare wellness, he is aware he can always be seen as needed in the meantime.    Please note that portions of this note were completed with a voice recognition program.     Electronically signed by MARIAN Kunz, 04/13/23, 14:28 CDT.

## 2023-04-14 LAB
BUN SERPL-MCNC: 14 MG/DL (ref 8–23)
BUN/CREAT SERPL: 19.4 (ref 7–25)
CALCIUM SERPL-MCNC: 9.9 MG/DL (ref 8.6–10.5)
CHLORIDE SERPL-SCNC: 98 MMOL/L (ref 98–107)
CO2 SERPL-SCNC: 24.2 MMOL/L (ref 22–29)
CREAT SERPL-MCNC: 0.72 MG/DL (ref 0.76–1.27)
EGFRCR SERPLBLD CKD-EPI 2021: 92.4 ML/MIN/1.73
GLUCOSE SERPL-MCNC: 94 MG/DL (ref 65–99)
POTASSIUM SERPL-SCNC: 4.5 MMOL/L (ref 3.5–5.2)
SODIUM SERPL-SCNC: 131 MMOL/L (ref 136–145)

## 2023-04-14 NOTE — PROGRESS NOTES
Overall BMP is unremarkable excepting sodium which is too low at 131, looking back this is minimal ongoing issue for him over the past few years at least.  Is not on any particular medications that are typically blame for this.  I would recommend incorporating 8 ounces of an electrolyte drink daily.

## 2023-04-17 RX ORDER — LISINOPRIL 10 MG/1
TABLET ORAL
Qty: 90 TABLET | Refills: 3 | Status: SHIPPED | OUTPATIENT
Start: 2023-04-17

## 2023-04-24 ENCOUNTER — TELEPHONE (OUTPATIENT)
Dept: CARDIOLOGY CLINIC | Age: 78
End: 2023-04-24

## 2023-04-24 PROCEDURE — 93294 REM INTERROG EVL PM/LDLS PM: CPT | Performed by: CLINICAL NURSE SPECIALIST

## 2023-04-24 PROCEDURE — 93296 REM INTERROG EVL PM/IDS: CPT | Performed by: CLINICAL NURSE SPECIALIST

## 2023-04-25 DIAGNOSIS — R00.1 BRADYCARDIA: Primary | ICD-10-CM

## 2023-04-25 DIAGNOSIS — Z95.0 PACEMAKER: ICD-10-CM

## 2023-04-25 DIAGNOSIS — I48.0 PAF (PAROXYSMAL ATRIAL FIBRILLATION) (HCC): ICD-10-CM

## 2023-06-06 ENCOUNTER — OFFICE VISIT (OUTPATIENT)
Dept: CARDIOLOGY CLINIC | Age: 78
End: 2023-06-06
Payer: MEDICARE

## 2023-06-06 VITALS
HEIGHT: 73 IN | DIASTOLIC BLOOD PRESSURE: 84 MMHG | SYSTOLIC BLOOD PRESSURE: 138 MMHG | HEART RATE: 68 BPM | WEIGHT: 212 LBS | BODY MASS INDEX: 28.1 KG/M2

## 2023-06-06 DIAGNOSIS — I48.0 PAF (PAROXYSMAL ATRIAL FIBRILLATION) (HCC): ICD-10-CM

## 2023-06-06 DIAGNOSIS — Z95.0 PACEMAKER: Primary | ICD-10-CM

## 2023-06-06 DIAGNOSIS — R00.1 BRADYCARDIA: ICD-10-CM

## 2023-06-06 PROCEDURE — 99213 OFFICE O/P EST LOW 20 MIN: CPT | Performed by: CLINICAL NURSE SPECIALIST

## 2023-06-06 PROCEDURE — 1123F ACP DISCUSS/DSCN MKR DOCD: CPT | Performed by: CLINICAL NURSE SPECIALIST

## 2023-06-06 PROCEDURE — G8427 DOCREV CUR MEDS BY ELIG CLIN: HCPCS | Performed by: CLINICAL NURSE SPECIALIST

## 2023-06-06 PROCEDURE — 93280 PM DEVICE PROGR EVAL DUAL: CPT | Performed by: CLINICAL NURSE SPECIALIST

## 2023-06-06 PROCEDURE — G8417 CALC BMI ABV UP PARAM F/U: HCPCS | Performed by: CLINICAL NURSE SPECIALIST

## 2023-06-06 PROCEDURE — 1036F TOBACCO NON-USER: CPT | Performed by: CLINICAL NURSE SPECIALIST

## 2023-06-06 RX ORDER — FLUTICASONE PROPIONATE 50 MCG
SPRAY, SUSPENSION (ML) NASAL
Qty: 32 G | Refills: 3 | Status: SHIPPED | OUTPATIENT
Start: 2023-06-06

## 2023-06-06 NOTE — PATIENT INSTRUCTIONS
Send [employer] home pacemaker reading on 9-6-23     Maintain good blood pressure control-goal<130/80 at rest  Maintain good cholesterol control LDL goal<70 with arterial disease  If you are diabetic work to keep/obtain hemoglobin A1c< 7    Follow up in 6 mos  With pacemaker   Call with any questions or concerns  Follow up with CHERYL Charles - [facility] for non cardiac problems  Report any new problems  Cardiovascular Fitness-Exercise as tolerated. Strive for 30 minutes of exercise most days of the week. Cardiac / Healthy Diet- Avoid processed high fat foods, maintain low sodium/salt   Continue current medications as directed  Continue plan of treatment  It is always recommended that you bring your medications bottles with you to each visit - this is for your safety!

## 2023-06-06 NOTE — PROGRESS NOTES
scoliosis. Skin -  no statis ulcers or dermatitis. Neurological - No focal signs are identified. Oriented to person, place and time. Psychiatric -  Appropriate affect and mood. Assessment:     Diagnosis Orders   1. Pacemaker        2. Bradycardia        3. PAF (paroxysmal atrial fibrillation) ([facility] [address]          Data:  BP Readings from Last 3 Encounters:   06/06/23 138/84   02/07/23 138/74   12/22/22 (!) 161/92    Pulse Readings from Last 3 Encounters:   06/06/23 68   01/04/23 88   12/22/22 85        Wt Readings from Last 3 Encounters:   06/06/23 212 lb (96.2 kg)   02/07/23 208 lb 12.8 oz (94.7 kg)   01/04/23 201 lb (91.2 kg)   Pacemaker check showed adequate battery status- approximately 23 months   and  appropriate diagnostics without any sustained arrhythmias. Mode AAIR-DDDR at 60  AS-VS 62%  AP- VS 35.9%   AS- 0.5%  AP-  1.5%  Next check in 3 months via [employer] home monitoring system      Blood pressure and heart rate controlled. Medical management includes ACE inhibitor. Also on statin and low-dose aspirin     Reviewed PCP recent notes   Reviewed recent labs     States taking medications as prescribed  Stable cardiovascular status. No evidence of overt heart failure, angina or dysrhythmia. 20minutes were spent preparing, reviewing and seeing patient. All questions answered    Plan    Send [employer] home pacemaker reading on 9-6-23     Maintain good blood pressure control-goal<130/80 at rest  Maintain good cholesterol control LDL goal<70 with arterial disease  If you are diabetic work to keep/obtain hemoglobin A1c< 7    Follow up in 6 mos  With pacemaker   Call with any questions or concerns  Follow up with CHERYL Eddy - NP for non cardiac problems  Report any new problems  Cardiovascular Fitness-Exercise as tolerated. Strive for 30 minutes of exercise most days of the week.     Cardiac / Healthy Diet- Avoid processed high fat foods, maintain low sodium/salt   Continue current

## 2023-08-09 DIAGNOSIS — K21.9 GASTROESOPHAGEAL REFLUX DISEASE: ICD-10-CM

## 2023-08-09 RX ORDER — OMEPRAZOLE 40 MG/1
CAPSULE, DELAYED RELEASE ORAL
Qty: 90 CAPSULE | Refills: 1 | Status: SHIPPED | OUTPATIENT
Start: 2023-08-09

## 2023-08-10 ENCOUNTER — LAB (OUTPATIENT)
Dept: INTERNAL MEDICINE | Facility: CLINIC | Age: 78
End: 2023-08-10
Payer: MEDICARE

## 2023-08-10 DIAGNOSIS — Z12.5 SCREENING FOR PROSTATE CANCER: ICD-10-CM

## 2023-08-10 DIAGNOSIS — I10 HTN (HYPERTENSION), BENIGN: ICD-10-CM

## 2023-08-10 DIAGNOSIS — R73.01 ELEVATED FASTING GLUCOSE: ICD-10-CM

## 2023-08-10 DIAGNOSIS — E78.00 HIGH CHOLESTEROL: ICD-10-CM

## 2023-08-11 LAB
ALBUMIN SERPL-MCNC: 4.5 G/DL (ref 3.5–5.2)
ALBUMIN/GLOB SERPL: 1.6 G/DL
ALP SERPL-CCNC: 61 U/L (ref 39–117)
ALT SERPL-CCNC: 23 U/L (ref 1–41)
APPEARANCE UR: CLEAR
AST SERPL-CCNC: 55 U/L (ref 1–40)
BACTERIA #/AREA URNS HPF: NORMAL /HPF
BASOPHILS # BLD AUTO: 0.03 10*3/MM3 (ref 0–0.2)
BASOPHILS NFR BLD AUTO: 0.6 % (ref 0–1.5)
BILIRUB SERPL-MCNC: 0.9 MG/DL (ref 0–1.2)
BILIRUB UR QL STRIP: NEGATIVE
BUN SERPL-MCNC: 12 MG/DL (ref 8–23)
BUN/CREAT SERPL: 14.8 (ref 7–25)
CALCIUM SERPL-MCNC: 9.9 MG/DL (ref 8.6–10.5)
CASTS URNS MICRO: NORMAL
CHLORIDE SERPL-SCNC: 96 MMOL/L (ref 98–107)
CHOLEST SERPL-MCNC: 181 MG/DL (ref 0–200)
CO2 SERPL-SCNC: 25.4 MMOL/L (ref 22–29)
COLOR UR: YELLOW
CREAT SERPL-MCNC: 0.81 MG/DL (ref 0.76–1.27)
EGFRCR SERPLBLD CKD-EPI 2021: 89.1 ML/MIN/1.73
EOSINOPHIL # BLD AUTO: 0.37 10*3/MM3 (ref 0–0.4)
EOSINOPHIL NFR BLD AUTO: 7.2 % (ref 0.3–6.2)
EPI CELLS #/AREA URNS HPF: NORMAL /HPF
ERYTHROCYTE [DISTWIDTH] IN BLOOD BY AUTOMATED COUNT: 12 % (ref 12.3–15.4)
GLOBULIN SER CALC-MCNC: 2.8 GM/DL
GLUCOSE SERPL-MCNC: 106 MG/DL (ref 65–99)
GLUCOSE UR QL STRIP: NEGATIVE
HBA1C MFR BLD: 5.4 % (ref 4.8–5.6)
HCT VFR BLD AUTO: 42.4 % (ref 37.5–51)
HDLC SERPL-MCNC: 50 MG/DL (ref 40–60)
HGB BLD-MCNC: 14.7 G/DL (ref 13–17.7)
HGB UR QL STRIP: NEGATIVE
IMM GRANULOCYTES # BLD AUTO: 0 10*3/MM3 (ref 0–0.05)
IMM GRANULOCYTES NFR BLD AUTO: 0 % (ref 0–0.5)
KETONES UR QL STRIP: NEGATIVE
LDLC SERPL CALC-MCNC: 106 MG/DL (ref 0–100)
LEUKOCYTE ESTERASE UR QL STRIP: NEGATIVE
LYMPHOCYTES # BLD AUTO: 1.27 10*3/MM3 (ref 0.7–3.1)
LYMPHOCYTES NFR BLD AUTO: 24.9 % (ref 19.6–45.3)
MCH RBC QN AUTO: 34.1 PG (ref 26.6–33)
MCHC RBC AUTO-ENTMCNC: 34.7 G/DL (ref 31.5–35.7)
MCV RBC AUTO: 98.4 FL (ref 79–97)
MONOCYTES # BLD AUTO: 0.38 10*3/MM3 (ref 0.1–0.9)
MONOCYTES NFR BLD AUTO: 7.4 % (ref 5–12)
NEUTROPHILS # BLD AUTO: 3.06 10*3/MM3 (ref 1.7–7)
NEUTROPHILS NFR BLD AUTO: 59.9 % (ref 42.7–76)
NITRITE UR QL STRIP: NEGATIVE
NRBC BLD AUTO-RTO: 0 /100 WBC (ref 0–0.2)
PH UR STRIP: 8 [PH] (ref 5–8)
PLATELET # BLD AUTO: 160 10*3/MM3 (ref 140–450)
POTASSIUM SERPL-SCNC: 4.7 MMOL/L (ref 3.5–5.2)
PROT SERPL-MCNC: 7.3 G/DL (ref 6–8.5)
PROT UR QL STRIP: NEGATIVE
RBC # BLD AUTO: 4.31 10*6/MM3 (ref 4.14–5.8)
RBC #/AREA URNS HPF: NORMAL /HPF
SODIUM SERPL-SCNC: 133 MMOL/L (ref 136–145)
SP GR UR STRIP: 1.01 (ref 1–1.03)
TRIGL SERPL-MCNC: 140 MG/DL (ref 0–150)
TSH SERPL DL<=0.005 MIU/L-ACNC: 2.66 UIU/ML (ref 0.27–4.2)
UROBILINOGEN UR STRIP-MCNC: NORMAL MG/DL
VLDLC SERPL CALC-MCNC: 25 MG/DL (ref 5–40)
WBC # BLD AUTO: 5.11 10*3/MM3 (ref 3.4–10.8)
WBC #/AREA URNS HPF: NORMAL /HPF

## 2023-08-17 ENCOUNTER — OFFICE VISIT (OUTPATIENT)
Dept: INTERNAL MEDICINE | Facility: CLINIC | Age: 78
End: 2023-08-17
Payer: MEDICARE

## 2023-08-17 VITALS
BODY MASS INDEX: 27.83 KG/M2 | DIASTOLIC BLOOD PRESSURE: 76 MMHG | SYSTOLIC BLOOD PRESSURE: 140 MMHG | OXYGEN SATURATION: 97 % | HEIGHT: 73 IN | WEIGHT: 210 LBS | HEART RATE: 75 BPM | TEMPERATURE: 97.8 F

## 2023-08-17 DIAGNOSIS — E78.00 HIGH CHOLESTEROL: ICD-10-CM

## 2023-08-17 DIAGNOSIS — Z95.0 PACEMAKER: ICD-10-CM

## 2023-08-17 DIAGNOSIS — M25.50 ARTHRALGIA, UNSPECIFIED JOINT: ICD-10-CM

## 2023-08-17 DIAGNOSIS — K22.70 BARRETT'S ESOPHAGUS WITHOUT DYSPLASIA: ICD-10-CM

## 2023-08-17 DIAGNOSIS — I48.0 PAF (PAROXYSMAL ATRIAL FIBRILLATION): ICD-10-CM

## 2023-08-17 DIAGNOSIS — Z91.81 AT LOW RISK FOR FALL: ICD-10-CM

## 2023-08-17 DIAGNOSIS — I10 HTN (HYPERTENSION), BENIGN: ICD-10-CM

## 2023-08-17 DIAGNOSIS — Z00.00 ENCOUNTER FOR SUBSEQUENT ANNUAL WELLNESS VISIT (AWV) IN MEDICARE PATIENT: Primary | ICD-10-CM

## 2023-08-17 NOTE — PROGRESS NOTES
The ABCs of the Annual Wellness Visit  Subsequent Medicare Wellness Visit    Subjective    Merlin William Ihnen is a 78 y.o. male who presents for a Subsequent Medicare Wellness Visit.    The following portions of the patient's history were reviewed and   updated as appropriate: allergies, current medications, past family history, past medical history, past social history, past surgical history, and problem list.    Compared to one year ago, the patient feels his physical   health is the same.    Compared to one year ago, the patient feels his mental   health is the same.    Recent Hospitalizations:  He was not admitted to the hospital during the last year.       Current Medical Providers:  Patient Care Team:  Paulina Lopez APRN as PCP - General (Internal Medicine)  Keyon Ann MD as Consulting Physician (Gastroenterology)    Outpatient Medications Prior to Visit   Medication Sig Dispense Refill    aspirin 81 MG EC tablet Take 1 tablet by mouth Daily. Three times a week      Cholecalciferol (VITAMIN D3) 2000 UNITS capsule Take  by mouth.      fluticasone (FLONASE) 50 MCG/ACT nasal spray USE 1 SPRAY IN EACH NOSTRIL EVERY DAY AS DIRECTED BY PROVIDER 32 g 3    lisinopril (PRINIVIL,ZESTRIL) 10 MG tablet TAKE 1 TABLET EVERY DAY 90 tablet 3    lovastatin (MEVACOR) 40 MG tablet TAKE 1 TABLET EVERY NIGHT 90 tablet 3    omeprazole (priLOSEC) 40 MG capsule TAKE 1 CAPSULE EVERY DAY 90 capsule 1    tamsulosin (FLOMAX) 0.4 MG capsule 24 hr capsule Take 1 capsule by mouth Every Night. 90 capsule 3    therapeutic multivitamin-minerals (THERAGRAN-M) tablet Take  by mouth.      triamcinolone (KENALOG) 0.1 % ointment Apply 1 application topically to the appropriate area as directed 2 (Two) Times a Day As Needed. (Patient not taking: Reported on 4/13/2023)       No facility-administered medications prior to visit.       No opioid medication identified on active medication list. I have reviewed chart for other potential   "high risk medication/s and harmful drug interactions in the elderly.        Aspirin is on active medication list. Aspirin use is indicated based on review of current medical condition/s. Pros and cons of this therapy have been discussed today. Benefits of this medication outweigh potential harm.  Patient has been encouraged to continue taking this medication.  .      Patient Active Problem List   Diagnosis    FHx: prostate cancer    Pacemaker    Peptic ulcer disease    BPH (benign prostatic hyperplasia)    Bradycardia    Erectile dysfunction of organic origin    Gastroesophageal reflux disease    Fleming's esophagus without dysplasia    Nonsmoker    Obesity, unspecified obesity severity, unspecified obesity type    HTN (hypertension), benign    PAF (paroxysmal atrial fibrillation)    Hx of adenomatous colonic polyps    Allergic rhinitis, mild    At low risk for fall    Elevated fasting glucose    High cholesterol    Incisional hernia, without obstruction or gangrene    Negative depression screening    Urinary retention     Advance Care Planning  Advance Directive is not on file.  ACP discussion was held with the patient during this visit. Patient does not have an advance directive, information provided.     Objective    Vitals:    08/17/23 1319   BP: 140/76   BP Location: Left arm   Patient Position: Sitting   Cuff Size: Adult   Pulse: 75   Temp: 97.8 øF (36.6 øC)   TempSrc: Infrared   SpO2: 97%   Weight: 95.3 kg (210 lb)   Height: 185.4 cm (73\")   PainSc: 0-No pain     Estimated body mass index is 27.71 kg/mý as calculated from the following:    Height as of this encounter: 185.4 cm (73\").    Weight as of this encounter: 95.3 kg (210 lb).    BMI is >= 25 and <30. (Overweight) The following options were offered after discussion;: exercise counseling/recommendations and nutrition counseling/recommendations      Does the patient have evidence of cognitive impairment? No    Lab Results   Component Value Date    CHLPL " 181 08/10/2023    TRIG 140 08/10/2023    HDL 50 08/10/2023     (H) 08/10/2023    VLDL 25 08/10/2023    HGBA1C 5.40 08/10/2023        HEALTH RISK ASSESSMENT    Smoking Status:  Social History     Tobacco Use   Smoking Status Former    Packs/day: 0.50    Years: 10.00    Pack years: 5.00    Types: Cigarettes    Quit date:     Years since quittin.6   Smokeless Tobacco Never   Tobacco Comments    quit over 50 yrs ago     Alcohol Consumption:  Social History     Substance and Sexual Activity   Alcohol Use Yes    Comment: 4-5 times weekly beer     Fall Risk Screen:    CARLOS Fall Risk Assessment was completed, and patient is at LOW risk for falls.Assessment completed on:2023    Depression Screenin/17/2023     1:24 PM   PHQ-2/PHQ-9 Depression Screening   Little Interest or Pleasure in Doing Things 0-->not at all   Feeling Down, Depressed or Hopeless 0-->not at all   PHQ-9: Brief Depression Severity Measure Score 0       Health Habits and Functional and Cognitive Screenin/17/2023     1:24 PM   Functional & Cognitive Status   Do you have difficulty preparing food and eating? No   Do you have difficulty bathing yourself, getting dressed or grooming yourself? No   Do you have difficulty using the toilet? No   Do you have difficulty moving around from place to place? No   Do you have trouble with steps or getting out of a bed or a chair? No   Current Diet Well Balanced Diet   Dental Exam Up to date   Eye Exam Up to date   Exercise (times per week) 7 times per week   Current Exercises Include House Cleaning;Yard Work   Do you need help using the phone?  No   Are you deaf or do you have serious difficulty hearing?  Yes   Do you need help to go to places out of walking distance? No   Do you need help shopping? No   Do you need help preparing meals?  No   Do you need help with housework?  No   Do you need help with laundry? No   Do you need help taking your medications? No   Do you need help  managing money? No   Do you ever drive or ride in a car without wearing a seat belt? No   Have you felt unusual stress, anger or loneliness in the last month? No   Who do you live with? Spouse   If you need help, do you have trouble finding someone available to you? No   Have you been bothered in the last four weeks by sexual problems? No   Do you have difficulty concentrating, remembering or making decisions? No       Age-appropriate Screening Schedule:  Refer to the list below for future screening recommendations based on patient's age, sex and/or medical conditions. Orders for these recommended tests are listed in the plan section. The patient has been provided with a written plan.    Health Maintenance   Topic Date Due    ZOSTER VACCINE (1 of 2) Never done    COVID-19 Vaccine (6 - Pfizer series) 03/11/2023    INFLUENZA VACCINE  10/01/2023    LIPID PANEL  08/10/2024    ANNUAL WELLNESS VISIT  08/17/2024    COLORECTAL CANCER SCREENING  09/09/2026    TDAP/TD VACCINES (2 - Td or Tdap) 05/21/2030    Pneumococcal Vaccine 65+  Completed                CMS Preventative Services Quick Reference  Risk Factors Identified During Encounter  Chronic Pain: Natural history and expected course discussed. Questions answered.  Fall Risk-High or Moderate: Discussed Fall Prevention in the home  Immunizations Discussed/Encouraged: Influenza, Shingrix, and COVID19  Dental Screening Recommended  Vision Screening Recommended  The above risks/problems have been discussed with the patient.  Pertinent information has been shared with the patient in the After Visit Summary.  An After Visit Summary and PPPS were made available to the patient.    Follow Up:   Next Medicare Wellness visit to be scheduled in 1 year.       Additional E&M Note during same encounter follows:  Patient has multiple medical problems which are significant and separately identifiable that require additional work above and beyond the Medicare Wellness Visit.      Chief  "Complaint  Medicare Wellness-subsequent and Back Pain (Pt states he has been experiencing lower back pain/aches for about a month./Pt states back pain is worse in the morning./Pt states he does work around the house and in his shop./Pt states he has taken aleve for pain and receives relieves./Pt has tried lidocaine patches and receives no relief. )    Subjective                 Objective   Vital Signs:  /76 (BP Location: Left arm, Patient Position: Sitting, Cuff Size: Adult)   Pulse 75   Temp 97.8 øF (36.6 øC) (Infrared)   Ht 185.4 cm (73\")   Wt 95.3 kg (210 lb)   SpO2 97%   BMI 27.71 kg/mý     Physical Exam  Vitals and nursing note reviewed.   Constitutional:       General: He is not in acute distress.     Appearance: Normal appearance. He is overweight. He is not ill-appearing, toxic-appearing or diaphoretic.   HENT:      Head: Normocephalic and atraumatic.      Right Ear: Tympanic membrane, ear canal and external ear normal. There is no impacted cerumen.      Left Ear: Tympanic membrane, ear canal and external ear normal. There is no impacted cerumen.      Ears:      Comments: Wears bilateral hearing aids     Nose: Nose normal.      Mouth/Throat:      Mouth: Mucous membranes are moist.      Pharynx: Oropharynx is clear. No oropharyngeal exudate or posterior oropharyngeal erythema.   Eyes:      General:         Right eye: No discharge.         Left eye: No discharge.      Extraocular Movements: Extraocular movements intact.      Conjunctiva/sclera: Conjunctivae normal.      Pupils: Pupils are equal, round, and reactive to light.   Neck:      Thyroid: No thyroid mass, thyromegaly or thyroid tenderness.   Cardiovascular:      Rate and Rhythm: Normal rate and regular rhythm.      Pulses: Normal pulses.      Heart sounds: Normal heart sounds.   Pulmonary:      Effort: Pulmonary effort is normal.      Breath sounds: Normal breath sounds.   Abdominal:      General: Bowel sounds are normal. There is no " distension.      Palpations: Abdomen is soft.   Musculoskeletal:         General: Tenderness (low back, midline, no radiaiton, no palpable abnormality) present.      Cervical back: Normal range of motion and neck supple.      Right lower leg: No edema.      Left lower leg: No edema.   Skin:     General: Skin is warm and dry.      Capillary Refill: Capillary refill takes less than 2 seconds.   Neurological:      General: No focal deficit present.      Mental Status: He is alert and oriented to person, place, and time. Mental status is at baseline.      Motor: No weakness.      Gait: Gait normal.   Psychiatric:         Mood and Affect: Mood normal.         Behavior: Behavior normal.         Thought Content: Thought content normal.         Judgment: Judgment normal.               Assessment and Plan   Diagnoses and all orders for this visit:    1. Encounter for subsequent annual wellness visit (AWV) in Medicare patient (Primary)    2. HTN (hypertension), benign    3. PAF (paroxysmal atrial fibrillation)    4. Fleming's esophagus without dysplasia    5. Pacemaker    6. Arthralgia, unspecified joint  -     Diclofenac Sodium (VOLTAREN) 1 % gel gel; Apply 4 g topically to the appropriate area as directed 4 (Four) Times a Day As Needed (arthralgia).  Dispense: 240 g; Refill: 3    7. At low risk for fall    8. High cholesterol         Plan of care and recent lab results reviewed with Mr. Eugene  Overall labs appear fairly unremarkable, he has not been using his treadmill as much as he used to, reports his routine was broken up a little bit when his wife injured her right hand/wrist.  Encouraged him to start incorporating exercise more routinely and adhere to low-cholesterol diet, continue with current statin.    Advised intake of electrolyte drink around 8 ounces daily to help with hyponatremia.  His blood pressure is little bit more elevated than usual today at 140/76, he admits he has not been checking it very routinely,  encouraged him to check more routinely over the next week or 2 and report readings consistently greater than 130/80, continue current treatment for now.  Continue with omeprazole  We discussed his low back pain that seems to improve after he gets up and moves for a while, does not radiate, does not cause any lower extremity weakness or numbness or tingling, likely arthralgia, we discussed trying Voltaren gel, may continue with the Aleve that he has been taking once daily.  We discussed other options including pain management, physical therapy, he would like to try the Voltaren gel initially.  Recommended continuing to stay physically active.  He continues to follow with cardiology for management of PAF, pacemaker.  We discussed the Shingrix vaccine, he is going to check with his pharmacist to see if his insurance will cover this  He plans on returning to the office once the COVID-19 vaccine (new 1) is available.  Continue with routine ophthalmology orthodontic exams  Return to the office in 6 months, prior to this as needed.    Follow Up   Return in about 6 months (around 2/17/2024).  Patient was given instructions and counseling regarding his condition or for health maintenance advice. Please see specific information pulled into the AVS if appropriate.       Please note that portions of this note were completed with a voice recognition program.     Electronically signed by MARIAN Kunz, 08/17/23, 16:13 CDT.

## 2023-08-18 ENCOUNTER — TELEPHONE (OUTPATIENT)
Dept: INTERNAL MEDICINE | Facility: CLINIC | Age: 78
End: 2023-08-18

## 2023-08-18 NOTE — TELEPHONE ENCOUNTER
Per Paulina Lopez,    Patient was advised he can use the Voltaren to the affected areas for relief.   Patient voiced understanding.

## 2023-08-18 NOTE — TELEPHONE ENCOUNTER
Caller: Ihnen, Merlin William    Relationship: Self    Best call back number:  [phone]     What is the best time to reach you:  ANYTIME    Who are you requesting to speak with:  CLINICAL    What was the call regarding:  PATIENT SAID HE RECEIVED FREE SAMPLES OF VOLTAREN YESTEDAY FOR HIS BACK PAIN.  PATIENT SAID HE NOTICED ON THE BOTTLE THAT THIS MED ISN'T USED FOR BACK OR SHOULDER PAIN.      Do you require a call back?  YES

## 2023-08-30 ENCOUNTER — TELEPHONE (OUTPATIENT)
Dept: SURGERY | Age: 78
End: 2023-08-30

## 2023-08-30 NOTE — TELEPHONE ENCOUNTER
8/30/2023 Spoke with patient regarding letter he dropped off for Dr Layla Staley requesting information linking his small bowel obstruction to his stomach cancer. After speaking with Dr Layla Staley she does not feel comfortable creating letter about this because she did not treat him for stomach cancer and feels this would be best to come from PCP or Dr that is treating cancer. She only treated patient for small bowel obstruction. Explained this to patient.   arlin

## 2023-09-07 ENCOUNTER — TELEPHONE (OUTPATIENT)
Dept: CARDIOLOGY CLINIC | Age: 78
End: 2023-09-07

## 2023-09-11 PROCEDURE — 93294 REM INTERROG EVL PM/LDLS PM: CPT | Performed by: CLINICAL NURSE SPECIALIST

## 2023-09-11 PROCEDURE — 93296 REM INTERROG EVL PM/IDS: CPT | Performed by: CLINICAL NURSE SPECIALIST

## 2023-09-12 DIAGNOSIS — Z95.0 PACEMAKER: Primary | ICD-10-CM

## 2023-09-12 DIAGNOSIS — R00.1 BRADYCARDIA: ICD-10-CM

## 2023-12-06 ENCOUNTER — OFFICE VISIT (OUTPATIENT)
Dept: CARDIOLOGY CLINIC | Age: 78
End: 2023-12-06
Payer: MEDICARE

## 2023-12-06 VITALS
HEIGHT: 73 IN | OXYGEN SATURATION: 97 % | SYSTOLIC BLOOD PRESSURE: 120 MMHG | BODY MASS INDEX: 28.49 KG/M2 | WEIGHT: 215 LBS | DIASTOLIC BLOOD PRESSURE: 84 MMHG | HEART RATE: 77 BPM

## 2023-12-06 DIAGNOSIS — Z95.0 PACEMAKER: Primary | ICD-10-CM

## 2023-12-06 DIAGNOSIS — I48.0 PAF (PAROXYSMAL ATRIAL FIBRILLATION) (HCC): ICD-10-CM

## 2023-12-06 DIAGNOSIS — R00.1 BRADYCARDIA: ICD-10-CM

## 2023-12-06 PROCEDURE — 1123F ACP DISCUSS/DSCN MKR DOCD: CPT | Performed by: CLINICAL NURSE SPECIALIST

## 2023-12-06 PROCEDURE — 1036F TOBACCO NON-USER: CPT | Performed by: CLINICAL NURSE SPECIALIST

## 2023-12-06 PROCEDURE — 93280 PM DEVICE PROGR EVAL DUAL: CPT | Performed by: CLINICAL NURSE SPECIALIST

## 2023-12-06 PROCEDURE — G8427 DOCREV CUR MEDS BY ELIG CLIN: HCPCS | Performed by: CLINICAL NURSE SPECIALIST

## 2023-12-06 PROCEDURE — 99213 OFFICE O/P EST LOW 20 MIN: CPT | Performed by: CLINICAL NURSE SPECIALIST

## 2023-12-06 PROCEDURE — G8484 FLU IMMUNIZE NO ADMIN: HCPCS | Performed by: CLINICAL NURSE SPECIALIST

## 2023-12-06 PROCEDURE — G8417 CALC BMI ABV UP PARAM F/U: HCPCS | Performed by: CLINICAL NURSE SPECIALIST

## 2023-12-06 NOTE — PATIENT INSTRUCTIONS
Send [employer] home pacemaker reading on 3-7-24    Maintain good blood pressure control-goal<130/80 at rest  Maintain good cholesterol control LDL goal<70 with arterial disease  If you are diabetic work to keep/obtain hemoglobin A1c< 7    Follow up in 6 mos  With pacemaker   Call with any questions or concerns  Follow up with CHERYL Lester - NP for non cardiac problems  Report any new problems  Cardiovascular Fitness-Exercise as tolerated. Strive for 30 minutes of exercise most days of the week. Cardiac / Healthy Diet- Avoid processed high fat foods, maintain low sodium/salt   Continue current medications as directed  Continue plan of treatment  It is always recommended that you bring your medications bottles with you to each visit - this is for your safety!

## 2023-12-06 NOTE — PROGRESS NOTES
medications as directed  Continue plan of treatment  It is always recommended that you bring your medications bottles with you to each visit - this is for your safety! CHERYL Pederson dragon/transcription disclaimer: Much of this encounter note is electronic transcription/translation of spoken language to printed tach. Electronic translation of spoken language may be erroneous, or at times, nonsensical words or phrases may be inadvertently transcribed.  Although, I have reviewed the note for such errors, some may still exist.

## 2023-12-13 DIAGNOSIS — R33.8 BENIGN PROSTATIC HYPERPLASIA WITH URINARY RETENTION: ICD-10-CM

## 2023-12-13 DIAGNOSIS — N40.1 BENIGN PROSTATIC HYPERPLASIA WITH URINARY RETENTION: ICD-10-CM

## 2023-12-13 RX ORDER — TAMSULOSIN HYDROCHLORIDE 0.4 MG/1
1 CAPSULE ORAL NIGHTLY
Qty: 90 CAPSULE | Refills: 3 | Status: SHIPPED | OUTPATIENT
Start: 2023-12-13

## 2024-01-16 NOTE — PROGRESS NOTES
Subjective    Mr. Eugene is 78 y.o. male    Chief Complaint: BPH    History of Present Illness  Benign Prostatic Hypertrophy  Patient complains of lower urinary tract symptoms. He reports frequency, incomplete emptying, nocturia three times a night, urgency, and weak stream. He denies intermittency and straining. Patient states symptoms are of moderate severity. Onset of symptoms was a few years ago and was gradual in onset. His AUA Symptom Score is, 11/35.He reports a history of no complicating symptoms. He denies flank pain, gross hematuria, kidney stones, and recurrent UTI.  Patient has tried Alpha blockers with improvement.  No recent PSA      The following portions of the patient's history were reviewed and updated as appropriate: allergies, current medications, past family history, past medical history, past social history, past surgical history and problem list.    Review of Systems   Constitutional:  Negative for chills and fever.   Gastrointestinal:  Negative for abdominal pain, anal bleeding and blood in stool.   Genitourinary:  Negative for dysuria and hematuria.         Current Outpatient Medications:     aspirin 81 MG EC tablet, Take 1 tablet by mouth Daily. Three times a week, Disp: , Rfl:     Cholecalciferol (VITAMIN D3) 2000 UNITS capsule, Take  by mouth., Disp: , Rfl:     Diclofenac Sodium (VOLTAREN) 1 % gel gel, Apply 4 g topically to the appropriate area as directed 4 (Four) Times a Day As Needed (arthralgia)., Disp: 240 g, Rfl: 3    fluticasone (FLONASE) 50 MCG/ACT nasal spray, USE 1 SPRAY IN EACH NOSTRIL EVERY DAY AS DIRECTED BY PROVIDER, Disp: 32 g, Rfl: 3    lisinopril (PRINIVIL,ZESTRIL) 10 MG tablet, TAKE 1 TABLET EVERY DAY, Disp: 90 tablet, Rfl: 3    lovastatin (MEVACOR) 40 MG tablet, TAKE 1 TABLET EVERY NIGHT, Disp: 90 tablet, Rfl: 3    omeprazole (priLOSEC) 40 MG capsule, TAKE 1 CAPSULE EVERY DAY, Disp: 90 capsule, Rfl: 1    tamsulosin (FLOMAX) 0.4 MG capsule 24 hr capsule, TAKE 1 CAPSULE  BY MOUTH EVERY NIGHT., Disp: 90 capsule, Rfl: 3    therapeutic multivitamin-minerals (THERAGRAN-M) tablet, Take  by mouth., Disp: , Rfl:     Past Medical History:   Diagnosis Date    Atrial fibrillation     Cancer     Elevated liver enzymes     Gastrointestinal stromal tumor (GIST)     GERD (gastroesophageal reflux disease)     Pacemaker     Squamous cell skin cancer        Past Surgical History:   Procedure Laterality Date    COLON SURGERY      COLONOSCOPY N/A 2021    Normal exam repeat in 5 years    COLONOSCOPY W/ POLYPECTOMY  2016    Polyp at 70 cm proximal to anus no changes of adenomatous or hyperplastic polyp identified repeat exam in 5 years    COLONOSCOPY W/ POLYPECTOMY  2013    Early Tubular adenoma repeat exam in 3 years    ENDOSCOPY  2016    Chronic esophagogastritis mild with Intestinal Metaplasia repeat exam in 3 years    ENDOSCOPY  2013    Mild chronic inflammation negative for Intestinal Metaplasia     ENDOSCOPY N/A 10/02/2019    Focal Intestinal Metaplasia repeat exam in 3 years    ENDOSCOPY N/A 2022    Procedure: ESOPHAGOGASTRODUODENOSCOPY WITH ANESTHESIA;  Surgeon: Keyon Ann MD;  Location: [facility] ENDOSCOPY;  Service: Gastroenterology;  Laterality: N/A;  pre: barretts  post: barretts. gastric polyps.   Paulina Lopez, APRN    NECK SURGERY      PACEMAKER IMPLANTATION      SQUAMOUS CELL CARCINOMA EXCISION         Social History     Socioeconomic History    Marital status:    Tobacco Use    Smoking status: Former     Packs/day: 0.50     Years: 10.00     Additional pack years: 0.00     Total pack years: 5.00     Types: Cigarettes     Quit date:      Years since quittin.0    Smokeless tobacco: Never    Tobacco comments:     quit over 50 yrs ago   Vaping Use    Vaping Use: Never used   Substance and Sexual Activity    Alcohol use: Yes     Comment: 4-5 times weekly beer    Drug use: No    Sexual activity: Yes     Partners: Female       Family  "History   Problem Relation Age of Onset    Heart disease Mother     No Known Problems Father     Colon polyps Neg Hx     Colon cancer Neg Hx        Objective    Temp 97.5 °F (36.4 °C)   Ht 185.4 cm (73\")   Wt 99 kg (218 lb 3.2 oz)   BMI 28.79 kg/m²     Physical Exam  Vitals reviewed.   Constitutional:       Appearance: Normal appearance.   HENT:      Head: Normocephalic and atraumatic.   Pulmonary:      Effort: Pulmonary effort is normal.   Skin:     Coloration: Skin is not pale.   Neurological:      Mental Status: He is alert.   Psychiatric:         Mood and Affect: Mood normal.         Behavior: Behavior normal.             Results for orders placed or performed in visit on 01/24/24   POC Urinalysis Dipstick, Multipro    Specimen: Urine   Result Value Ref Range    Color Yellow Yellow, Straw, Dark Yellow, Umm    Clarity, UA Clear Clear    Glucose, UA Negative Negative mg/dL    Bilirubin Negative Negative    Ketones, UA Negative Negative    Specific Gravity  1.020 1.005 - 1.030    Blood, UA Negative Negative    pH, Urine 7.0 5.0 - 8.0    Protein, POC Negative Negative mg/dL    Urobilinogen, UA 0.2 E.U./dL Normal, 0.2 E.U./dL    Nitrite, UA Negative Negative    Leukocytes Negative Negative     IPSS Questionnaire (AUA-7):  Incomplete emptying  Over the past month, how often have you had a sensation of not emptying your bladder completely after you finished urinating?: About half the time (01/24/24 1005)  Frequency  Over the past month, how often have you had to urinate again less than two hours after you finishied urinating ?: About half the time (01/24/24 1005)  Intermittency  Over the past month, how often have you found you stopped and started again several time when you urinated ?: Not at all (01/24/24 1005)  Urgency  Over the last month, how often have you found it difficult  have you found it difficult to postpone urination ?: Less than 1 time in 5 (01/24/24 1005)  Weak Stream  Over the past month, how " often have you had a weak urinary stream ?: Less than 1 time in 5 (01/24/24 1005)  Straining  Over the past month, how often have you had to push or strain to begin urination ?: Not at all (01/24/24 1005)  Nocturia  Over the past month, how many times did you most typically get up to urinate from the time you went to bed until the time you got up in the morning ?: 3 times (01/24/24 1005)  Quality of life due to urinary symptoms  If you were to spend the rest of your life with your urinary condition the way it is now, how would feel about that?: Mostly satisfied (01/24/24 1005)    Scores  Total IPSS Score: (!) 11 (01/24/24 1005)  Total Score = Symptomatic Level: Moderately symptomatic: 8-19 (01/24/24 1005)       Assessment and Plan    Diagnoses and all orders for this visit:    1. Benign prostatic hyperplasia with urinary retention (Primary)  -     POC Urinalysis Dipstick, Multipro    Patient overall doing well on 1 tamsulosin daily.  He has symptoms but he is not bothered enough to change treatment at this time.  His urine is clear.  His digital rectal exam was benign.  No longer gets PSAs.    Follow-up 1 year.

## 2024-01-24 ENCOUNTER — OFFICE VISIT (OUTPATIENT)
Dept: UROLOGY | Facility: CLINIC | Age: 79
End: 2024-01-24
Payer: MEDICARE

## 2024-01-24 VITALS — BODY MASS INDEX: 28.92 KG/M2 | TEMPERATURE: 97.5 F | HEIGHT: 73 IN | WEIGHT: 218.2 LBS

## 2024-01-24 DIAGNOSIS — R33.8 BENIGN PROSTATIC HYPERPLASIA WITH URINARY RETENTION: Primary | ICD-10-CM

## 2024-01-24 DIAGNOSIS — N40.1 BENIGN PROSTATIC HYPERPLASIA WITH URINARY RETENTION: Primary | ICD-10-CM

## 2024-01-24 LAB
BILIRUB BLD-MCNC: NEGATIVE MG/DL
CLARITY, POC: CLEAR
COLOR UR: YELLOW
GLUCOSE UR STRIP-MCNC: NEGATIVE MG/DL
KETONES UR QL: NEGATIVE
LEUKOCYTE EST, POC: NEGATIVE
NITRITE UR-MCNC: NEGATIVE MG/ML
PH UR: 7 [PH] (ref 5–8)
PROT UR STRIP-MCNC: NEGATIVE MG/DL
RBC # UR STRIP: NEGATIVE /UL
SP GR UR: 1.02 (ref 1–1.03)
UROBILINOGEN UR QL: NORMAL

## 2024-01-24 PROCEDURE — 1160F RVW MEDS BY RX/DR IN RCRD: CPT | Performed by: PHYSICIAN ASSISTANT

## 2024-01-24 PROCEDURE — 99213 OFFICE O/P EST LOW 20 MIN: CPT | Performed by: PHYSICIAN ASSISTANT

## 2024-01-24 PROCEDURE — 1159F MED LIST DOCD IN RCRD: CPT | Performed by: PHYSICIAN ASSISTANT

## 2024-01-24 PROCEDURE — 81001 URINALYSIS AUTO W/SCOPE: CPT | Performed by: PHYSICIAN ASSISTANT

## 2024-02-01 ENCOUNTER — TELEPHONE (OUTPATIENT)
Dept: INTERNAL MEDICINE | Facility: CLINIC | Age: 79
End: 2024-02-01
Payer: MEDICARE

## 2024-02-01 NOTE — TELEPHONE ENCOUNTER
Caller:     Ihnen, Merlin William        Relationship: SELF     Best call back number: [phone]     What is the best time to reach you: ANYTIME     Who are you requesting to speak with (clinical staff, provider,  specific staff member): CLINICAL     Do you know the name of the person who called: CLINICAL     What was the call regarding:  HE STATES HE WOULD LIKE TO BE ADVISED IF HE WILL BE HAVING LABS DRAWN BEFORE OR ON HIS NEXT APPOINTMENT   Is it okay if the provider responds through MyChart: CALL BACK       
Informed  Merlin about pending fasting labs available before the visit.  
Lm letting pt know yes there are some fasting labs to be completed and gave times he could stop by   
[FreeTextEntry1] : This is a 61-year-old female with history of A-fib on Coumadin presenting for follow-up visit.  This is the first time she is seen by me.  She follows with Dr. Ceasar Ceballos who sent her for an upper endoscopy with Endoflip with Dr. Espinal last week.  The patient is accompanied by her daughter.  She relates that she has trouble swallowing rice and that it seems to hang up on the upper esophagus/throat.  She has trouble with other solid foods.  She denies GERD.  She denies abdominal pain nausea or vomiting. x-ray esophagram performed on March 14 of this year x-ray esophagram performed on March 14, 2023 showing esophageal reflux, barium tablet impacted at the GE junction.  An upper endoscopy performed on August 9, 2023 by Dr. Ye Espinal showing a normal esophagus biopsies negative for eosinophilic esophagitis, stomach normal, duodenal normal.  Endoflip suggesting normal LES distensibility.

## 2024-02-08 ENCOUNTER — OFFICE VISIT (OUTPATIENT)
Dept: INTERNAL MEDICINE | Facility: CLINIC | Age: 79
End: 2024-02-08
Payer: MEDICARE

## 2024-02-08 VITALS
HEIGHT: 73 IN | TEMPERATURE: 97.3 F | HEART RATE: 64 BPM | OXYGEN SATURATION: 98 % | DIASTOLIC BLOOD PRESSURE: 84 MMHG | SYSTOLIC BLOOD PRESSURE: 136 MMHG | WEIGHT: 217 LBS | BODY MASS INDEX: 28.76 KG/M2

## 2024-02-08 DIAGNOSIS — K22.70 BARRETT'S ESOPHAGUS WITHOUT DYSPLASIA: ICD-10-CM

## 2024-02-08 DIAGNOSIS — E78.00 HIGH CHOLESTEROL: ICD-10-CM

## 2024-02-08 DIAGNOSIS — G89.29 CHRONIC BILATERAL LOW BACK PAIN WITHOUT SCIATICA: ICD-10-CM

## 2024-02-08 DIAGNOSIS — K21.9 GASTROESOPHAGEAL REFLUX DISEASE, UNSPECIFIED WHETHER ESOPHAGITIS PRESENT: ICD-10-CM

## 2024-02-08 DIAGNOSIS — E87.1 HYPONATREMIA: ICD-10-CM

## 2024-02-08 DIAGNOSIS — Z95.0 PACEMAKER: Primary | ICD-10-CM

## 2024-02-08 DIAGNOSIS — I10 HTN (HYPERTENSION), BENIGN: ICD-10-CM

## 2024-02-08 DIAGNOSIS — I48.0 PAF (PAROXYSMAL ATRIAL FIBRILLATION): ICD-10-CM

## 2024-02-08 DIAGNOSIS — R00.1 BRADYCARDIA: ICD-10-CM

## 2024-02-08 DIAGNOSIS — M54.50 CHRONIC BILATERAL LOW BACK PAIN WITHOUT SCIATICA: ICD-10-CM

## 2024-02-08 NOTE — PROGRESS NOTES
Subjective   Merlin William Ihnen is a 79 y.o. male.   Chief Complaint   Patient presents with    Hypertension     6 month f/u;  Denies chest pains and SOB.    Hyperlipidemia     6 month f/u       History of Present Illness   Mr. Eugene presents to the office today for a routine 6-month follow-up on management of hypertension.    The following portions of the patient's history were reviewed and updated as appropriate: allergies, current medications, past family history, past medical history, past social history, past surgical history and problem list.    Review of Systems    Objective   Past Medical History:   Diagnosis Date    Atrial fibrillation     Cancer     Elevated liver enzymes     Gastrointestinal stromal tumor (GIST)     GERD (gastroesophageal reflux disease)     Pacemaker     Squamous cell skin cancer       Past Surgical History:   Procedure Laterality Date    COLON SURGERY      COLONOSCOPY N/A 09/09/2021    Normal exam repeat in 5 years    COLONOSCOPY W/ POLYPECTOMY  07/19/2016    Polyp at 70 cm proximal to anus no changes of adenomatous or hyperplastic polyp identified repeat exam in 5 years    COLONOSCOPY W/ POLYPECTOMY  04/19/2013    Early Tubular adenoma repeat exam in 3 years    ENDOSCOPY  07/19/2016    Chronic esophagogastritis mild with Intestinal Metaplasia repeat exam in 3 years    ENDOSCOPY  04/19/2013    Mild chronic inflammation negative for Intestinal Metaplasia     ENDOSCOPY N/A 10/02/2019    Focal Intestinal Metaplasia repeat exam in 3 years    ENDOSCOPY N/A 11/2/2022    Procedure: ESOPHAGOGASTRODUODENOSCOPY WITH ANESTHESIA;  Surgeon: Keyon Ann MD;  Location: [facility] ENDOSCOPY;  Service: Gastroenterology;  Laterality: N/A;  pre: barretts  post: barretts. gastric polyps.   Paulina Lopez C, APRN    NECK SURGERY      PACEMAKER IMPLANTATION      SQUAMOUS CELL CARCINOMA EXCISION          Current Outpatient Medications:     aspirin 81 MG EC tablet, Take 1 tablet by mouth Daily. Three  "times a week, Disp: , Rfl:     Cholecalciferol (VITAMIN D3) 2000 UNITS capsule, Take  by mouth., Disp: , Rfl:     fluticasone (FLONASE) 50 MCG/ACT nasal spray, USE 1 SPRAY IN EACH NOSTRIL EVERY DAY AS DIRECTED BY PROVIDER, Disp: 32 g, Rfl: 3    lisinopril (PRINIVIL,ZESTRIL) 10 MG tablet, TAKE 1 TABLET EVERY DAY, Disp: 90 tablet, Rfl: 3    lovastatin (MEVACOR) 40 MG tablet, TAKE 1 TABLET EVERY NIGHT, Disp: 90 tablet, Rfl: 3    omeprazole (priLOSEC) 40 MG capsule, TAKE 1 CAPSULE EVERY DAY, Disp: 90 capsule, Rfl: 1    tamsulosin (FLOMAX) 0.4 MG capsule 24 hr capsule, TAKE 1 CAPSULE BY MOUTH EVERY NIGHT., Disp: 90 capsule, Rfl: 3    therapeutic multivitamin-minerals (THERAGRAN-M) tablet, Take  by mouth., Disp: , Rfl:       /84 (BP Location: Left arm, Patient Position: Sitting, Cuff Size: Adult)   Pulse 64   Temp 97.3 °F (36.3 °C) (Infrared)   Ht 185.4 cm (73\")   Wt 98.4 kg (217 lb)   SpO2 98%   BMI 28.63 kg/m²      Body mass index is 28.63 kg/m².          Physical Exam  Vitals and nursing note reviewed.   Constitutional:       General: He is not in acute distress.     Appearance: Normal appearance. He is overweight. He is not ill-appearing, toxic-appearing or diaphoretic.   HENT:      Head: Normocephalic and atraumatic.   Eyes:      Extraocular Movements: Extraocular movements intact.      Conjunctiva/sclera: Conjunctivae normal.      Pupils: Pupils are equal, round, and reactive to light.   Cardiovascular:      Rate and Rhythm: Normal rate and regular rhythm.      Pulses: Normal pulses.      Heart sounds: Normal heart sounds.   Pulmonary:      Effort: Pulmonary effort is normal.      Breath sounds: Normal breath sounds.   Abdominal:      General: Bowel sounds are normal.      Palpations: Abdomen is soft.   Musculoskeletal:         General: Tenderness (low back with radiation to bilaterla hips, chronic) present.      Cervical back: Normal range of motion and neck supple.      Right lower leg: No edema.      " Left lower leg: No edema.   Skin:     General: Skin is warm and dry.   Neurological:      General: No focal deficit present.      Mental Status: He is alert and oriented to person, place, and time. Mental status is at baseline.   Psychiatric:         Mood and Affect: Mood normal.         Behavior: Behavior normal.         Thought Content: Thought content normal.         Judgment: Judgment normal.               Assessment & Plan   Diagnoses and all orders for this visit:    1. Pacemaker (Primary)    2. Bradycardia    3. HTN (hypertension), benign    4. PAF (paroxysmal atrial fibrillation)    5. High cholesterol    6. Gastroesophageal reflux disease, unspecified whether esophagitis present    7. 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 Hyponatremia    9. Chronic bilateral low back pain without sciatica               Plan of care and recent lab results reviewed with Mr. Eugene  Bradycardia has not recently been issue, he has pacemaker, continues to follow with [facility] cardiology -denies any issues with shortness of breath, chest pain, dizziness, syncope.  Blood pressures at home have been well-controlled, similar to today, 136/84, pulse rate 64, he will continue lisinopril 10 mg daily for now.  He has had persistent hyponatremia despite adding a little bit of salt to his food.  He has not really incorporate electrolyte beverages.  Reviewed all of his medications on up-to-date, not indicated as very common side effects but both lisinopril and omeprazole can potentially cause hyponatremia.  Unfortunately discontinuing PPI therapy poses a greater risk than benefit with known Fleming's esophagus, most recent EGD November 2022 -continuing on 3-year recall with gastroenterology  We discussed the risk versus benefits of trying to switch his antihypertensive therapy, advised we are not certain that the lisinopril in fact is causing his hyponatremia, his hypertension is currently well-managed with the lisinopril without any  negative side effects.  I have suggested rather that he incorporate 16 ounces of light body armor daily.  Monitor for signs and symptoms of worsening hyponatremia such as increased fatigue, headache, dizziness etc if they were to occur call office.  Encouraged to continue with a healthy lifestyle which includes a healthy nutrient dense diet, practicing portion control, and incorporating some form of daily physical activity/exercise.  A1c has persistently been in normal range, no need for reevaluation unless otherwise indicated.  He stopped using the diclofenac gel, he reports he was using it on his bilateral hips and lower back and noticed some GI upset, advised when used on such a large area there is increased risk of systemic absorption, NSAIDs are known to potentially cause gastrointestinal side effects.  Advised only to use on small areas such as the hands, knees.  He has had some benefit with EMU topically, advised this is fine to continue with.  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  He does get great relief from the massage component of his car seat.  We discussed getting a massage gun at home.  We also discussed TENS unit, he is very interested in trying this.  I did advise with his pacemaker it would have to be safe to use, I advised I would proceed with sending referral to a local facility, they will know more about whether this device is safe to use in the setting of having a pacemaker ( [employer]).   The therapies to implement include stretching, range of motion exercises.  Discussed vaccines, he is have the influenza vaccine, is scheduled to get the second Shingrix vaccine end of February.  He declines RSV vaccination and recurrent COVID-19 vaccination for now.  Encouraged to return to the office sooner than next scheduled appointment for any acute issues or concerns  Please note that  portions of this note were completed with a voice recognition program.     Electronically signed by MARIAN Kunz, 02/08/24, 14:40 CST.

## 2024-02-12 ENCOUNTER — TELEPHONE (OUTPATIENT)
Dept: INTERNAL MEDICINE | Facility: CLINIC | Age: 79
End: 2024-02-12
Payer: MEDICARE

## 2024-02-12 ENCOUNTER — TELEPHONE (OUTPATIENT)
Dept: INTERNAL MEDICINE | Facility: CLINIC | Age: 79
End: 2024-02-12

## 2024-02-12 NOTE — TELEPHONE ENCOUNTER
Caller: Joselyn Eugene    Relationship: Emergency Contact    Best call back number: [phone]     What is the best time to reach you: MID MORNINGS    Who are you requesting to speak with (clinical staff, provider,  specific staff member): CLINICAL    What was the call regarding: CALLER STATED THAT THE SHE WANTS TO MAKE SURE THAT THE PRESCRIPTIONS THAT [facility] PRESCRIBES ARE AUTOMATICALLY CALLED INTO [facility] BECAUSE THEY JUST SWITCHED FROM [employer] [facility] TO [facility]?    Is it okay if the provider responds through [email or web address]: NO, PLEASE CALL

## 2024-02-12 NOTE — TELEPHONE ENCOUNTER
Left detailed message stating I have switched pharmacies to [facility] and any future refills will be sent there.

## 2024-02-13 ENCOUNTER — TELEPHONE (OUTPATIENT)
Dept: INTERNAL MEDICINE | Facility: CLINIC | Age: 79
End: 2024-02-13
Payer: MEDICARE

## 2024-02-13 DIAGNOSIS — K21.9 GASTROESOPHAGEAL REFLUX DISEASE: ICD-10-CM

## 2024-02-13 RX ORDER — OMEPRAZOLE 40 MG/1
40 CAPSULE, DELAYED RELEASE ORAL DAILY
Qty: 90 CAPSULE | Refills: 3 | Status: SHIPPED | OUTPATIENT
Start: 2024-02-13

## 2024-03-08 DIAGNOSIS — R00.1 BRADYCARDIA: ICD-10-CM

## 2024-03-08 DIAGNOSIS — I48.0 PAF (PAROXYSMAL ATRIAL FIBRILLATION) (HCC): ICD-10-CM

## 2024-03-08 DIAGNOSIS — Z95.0 PACEMAKER: Primary | ICD-10-CM

## 2024-03-12 DIAGNOSIS — N40.1 BENIGN PROSTATIC HYPERPLASIA WITH URINARY RETENTION: ICD-10-CM

## 2024-03-12 DIAGNOSIS — R33.8 BENIGN PROSTATIC HYPERPLASIA WITH URINARY RETENTION: ICD-10-CM

## 2024-03-12 RX ORDER — TAMSULOSIN HYDROCHLORIDE 0.4 MG/1
1 CAPSULE ORAL NIGHTLY
Qty: 90 CAPSULE | Refills: 3 | Status: SHIPPED | OUTPATIENT
Start: 2024-03-12

## 2024-03-12 RX ORDER — FLUTICASONE PROPIONATE 50 MCG
2 SPRAY, SUSPENSION (ML) NASAL DAILY
Qty: 32 G | Refills: 3 | Status: SHIPPED | OUTPATIENT
Start: 2024-03-12

## 2024-03-12 RX ORDER — LOVASTATIN 40 MG/1
40 TABLET ORAL NIGHTLY
Qty: 90 TABLET | Refills: 3 | Status: SHIPPED | OUTPATIENT
Start: 2024-03-12

## 2024-03-12 RX ORDER — LISINOPRIL 10 MG/1
10 TABLET ORAL DAILY
Qty: 90 TABLET | Refills: 3 | Status: SHIPPED | OUTPATIENT
Start: 2024-03-12

## 2024-03-12 NOTE — TELEPHONE ENCOUNTER
Caller: Joselyn Eugene    Relationship: Emergency Contact    Best call back number: [phone]     Requested Prescriptions:   Requested Prescriptions     Pending Prescriptions Disp Refills    fluticasone (FLONASE) 50 MCG/ACT nasal spray 32 g 3    lovastatin (MEVACOR) 40 MG tablet 90 tablet 3     Si tablet Every Night.    lisinopril (PRINIVIL,ZESTRIL) 10 MG tablet 90 tablet 3     Sig: Take 1 tablet by mouth Daily.    tamsulosin (FLOMAX) 0.4 MG capsule 24 hr capsule 90 capsule 3     Sig: Take 1 capsule by mouth Every Night.        Pharmacy where request should be sent: [facility] [address] [phone] [facility] [phone]      Last office visit with prescribing clinician: 2024   Last telemedicine visit with prescribing clinician: Visit date not found   Next office visit with prescribing clinician: 2024     Additional details provided by patient: PATIENT WIFE STATE PATIENT HAS ABOUT A WEEK LEFT OF ONE MEDICATION, THE REST IT TWO WEEKS.    Does the patient have less than a 3 day supply:  [] Yes  [x] No    Would you like a call back once the refill request has been completed: [] Yes [x] No    If the office needs to give you a call back, can they leave a voicemail: [] Yes [x] No    Phyllis Spencer Rep   24 11:40 CDT

## 2024-06-10 ENCOUNTER — TELEPHONE (OUTPATIENT)
Dept: CARDIOLOGY CLINIC | Age: 79
End: 2024-06-10

## 2024-06-10 NOTE — TELEPHONE ENCOUNTER
Called PT to remind him to send in his [employer] remote interrogation on his home monitor. He stated he will do it before upcoming appt.

## 2024-06-11 DIAGNOSIS — Z95.0 PACEMAKER: Primary | ICD-10-CM

## 2024-06-11 DIAGNOSIS — I48.0 PAF (PAROXYSMAL ATRIAL FIBRILLATION) (HCC): ICD-10-CM

## 2024-06-11 DIAGNOSIS — R00.1 BRADYCARDIA: ICD-10-CM

## 2024-06-25 ENCOUNTER — OFFICE VISIT (OUTPATIENT)
Dept: CARDIOLOGY CLINIC | Age: 79
End: 2024-06-25
Payer: MEDICARE

## 2024-06-25 VITALS
HEIGHT: 73 IN | OXYGEN SATURATION: 96 % | SYSTOLIC BLOOD PRESSURE: 120 MMHG | DIASTOLIC BLOOD PRESSURE: 70 MMHG | HEART RATE: 64 BPM | WEIGHT: 215 LBS | BODY MASS INDEX: 28.49 KG/M2

## 2024-06-25 DIAGNOSIS — I48.0 PAF (PAROXYSMAL ATRIAL FIBRILLATION) (HCC): Primary | ICD-10-CM

## 2024-06-25 PROCEDURE — 1123F ACP DISCUSS/DSCN MKR DOCD: CPT | Performed by: INTERNAL MEDICINE

## 2024-06-25 PROCEDURE — G8427 DOCREV CUR MEDS BY ELIG CLIN: HCPCS | Performed by: INTERNAL MEDICINE

## 2024-06-25 PROCEDURE — G8417 CALC BMI ABV UP PARAM F/U: HCPCS | Performed by: INTERNAL MEDICINE

## 2024-06-25 PROCEDURE — 99204 OFFICE O/P NEW MOD 45 MIN: CPT | Performed by: INTERNAL MEDICINE

## 2024-06-25 PROCEDURE — 1036F TOBACCO NON-USER: CPT | Performed by: INTERNAL MEDICINE

## 2024-06-25 NOTE — PROGRESS NOTES
[facility]  [address]  [address]  [place] [address]  [phone]        Merlin Ihnen is a 79 y.o. male presents with paroxysmal atrial fibrillation.  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  Currently he is asymptomatic from the atrial fibrillation and it is not very frequent.  He has not had any problems with his device.      Review of Systems   Constitutional: Negative for fever, chills, diaphoresis, activity change, appetite change, fatigue and unexpected weight change.   Eyes: Negative for photophobia, pain, redness and visual disturbance.   Respiratory: Negative for apnea, cough, chest tightness, shortness of breath, wheezing and stridor.    Cardiovascular: Negative for chest pain, palpitations and leg swelling.   Gastrointestinal: Negative for abdominal distention.   Genitourinary: Negative for dysuria, urgency and frequency.   Musculoskeletal: Negative for myalgias, arthralgias and gait problem.   Skin: Negative for color change, pallor, rash and wound.   Neurological: Negative for dizziness, tremors, speech difficulty, weakness and numbness.   Hematological: Does not bruise/bleed easily.   Psychiatric/Behavioral: Negative.      Past Medical History:   Diagnosis Date    BCC (basal cell carcinoma of skin)     BPH (benign prostatic hyperplasia) 6/15/2016    Bradycardia     Dysmetabolic syndrome     Elevated PSA     Fatty liver     Gout     Hypercholesteremia     Dr. Petit manages    Hyperlipidemia     Hypertension     Pacemaker 9/9/13  MDL    generator replacement    Peptic ulcer disease     Stomach cancer (HCC)         Past Surgical History:   Procedure Laterality Date    CERVICAL SPINE SURGERY      COLONOSCOPY      CYST REMOVAL      GASTRECTOMY      HEMICOLECTOMY      LAPAROTOMY N/A 12/14/2022    LAPAROTOMY, OMENTECTOMY, LYSIS OF ADHESIONS, SMALL BOWEL RESECTION performed by Katina Nam DO at [facility] OR    PACEMAKER INSERTION  9/6/13    generator

## 2024-08-09 ENCOUNTER — TELEPHONE (OUTPATIENT)
Dept: INTERNAL MEDICINE | Facility: CLINIC | Age: 79
End: 2024-08-09

## 2024-08-09 ENCOUNTER — APPOINTMENT (OUTPATIENT)
Dept: GENERAL RADIOLOGY | Age: 79
End: 2024-08-09
Payer: MEDICARE

## 2024-08-09 ENCOUNTER — HOSPITAL ENCOUNTER (EMERGENCY)
Age: 79
Discharge: HOME | End: 2024-08-09
Attending: STUDENT IN AN ORGANIZED HEALTH CARE EDUCATION/TRAINING PROGRAM
Payer: MEDICARE

## 2024-08-09 VITALS
BODY MASS INDEX: 28.49 KG/M2 | HEART RATE: 61 BPM | DIASTOLIC BLOOD PRESSURE: 65 MMHG | TEMPERATURE: 97.8 F | RESPIRATION RATE: 18 BRPM | SYSTOLIC BLOOD PRESSURE: 119 MMHG | HEIGHT: 73 IN | OXYGEN SATURATION: 96 % | WEIGHT: 215 LBS

## 2024-08-09 DIAGNOSIS — R00.2 PALPITATIONS: Primary | ICD-10-CM

## 2024-08-09 LAB
ALBUMIN SERPL-MCNC: 4.4 G/DL (ref 3.5–5.2)
ALP SERPL-CCNC: 63 U/L (ref 40–130)
ALT SERPL-CCNC: 21 U/L (ref 5–41)
ANION GAP SERPL CALCULATED.3IONS-SCNC: 12 MMOL/L (ref 7–19)
AST SERPL-CCNC: 51 U/L (ref 5–40)
BASOPHILS # BLD: 0.1 K/UL (ref 0–0.2)
BASOPHILS NFR BLD: 0.7 % (ref 0–1)
BILIRUB SERPL-MCNC: 0.4 MG/DL (ref 0.2–1.2)
BUN SERPL-MCNC: 14 MG/DL (ref 8–23)
CALCIUM SERPL-MCNC: 9.2 MG/DL (ref 8.8–10.2)
CHLORIDE SERPL-SCNC: 95 MMOL/L (ref 98–111)
CO2 SERPL-SCNC: 23 MMOL/L (ref 22–29)
CREAT SERPL-MCNC: 0.7 MG/DL (ref 0.5–1.2)
EOSINOPHIL # BLD: 0.4 K/UL (ref 0–0.6)
EOSINOPHIL NFR BLD: 6.1 % (ref 0–5)
ERYTHROCYTE [DISTWIDTH] IN BLOOD BY AUTOMATED COUNT: 12 % (ref 11.5–14.5)
GLUCOSE SERPL-MCNC: 120 MG/DL (ref 74–109)
HCT VFR BLD AUTO: 40.5 % (ref 42–52)
HGB BLD-MCNC: 14.4 G/DL (ref 14–18)
IMM GRANULOCYTES # BLD: 0 K/UL
LYMPHOCYTES # BLD: 2 K/UL (ref 1.1–4.5)
LYMPHOCYTES NFR BLD: 27.6 % (ref 20–40)
MAGNESIUM SERPL-MCNC: 2 MG/DL (ref 1.6–2.4)
MCH RBC QN AUTO: 33.8 PG (ref 27–31)
MCHC RBC AUTO-ENTMCNC: 35.6 G/DL (ref 33–37)
MCV RBC AUTO: 95.1 FL (ref 80–94)
MONOCYTES # BLD: 0.7 K/UL (ref 0–0.9)
MONOCYTES NFR BLD: 9.2 % (ref 0–10)
NEUTROPHILS # BLD: 4 K/UL (ref 1.5–7.5)
NEUTS SEG NFR BLD: 56.1 % (ref 50–65)
PLATELET # BLD AUTO: 182 K/UL (ref 130–400)
PMV BLD AUTO: 8.5 FL (ref 9.4–12.4)
POTASSIUM SERPL-SCNC: 4.5 MMOL/L (ref 3.5–5)
PROT SERPL-MCNC: 7.4 G/DL (ref 6.6–8.7)
RBC # BLD AUTO: 4.26 M/UL (ref 4.7–6.1)
SODIUM SERPL-SCNC: 130 MMOL/L (ref 136–145)
TROPONIN, HIGH SENSITIVITY: 9 NG/L (ref 0–22)
WBC # BLD AUTO: 7.1 K/UL (ref 4.8–10.8)

## 2024-08-09 PROCEDURE — 36415 COLL VENOUS BLD VENIPUNCTURE: CPT

## 2024-08-09 PROCEDURE — 85025 COMPLETE CBC W/AUTO DIFF WBC: CPT

## 2024-08-09 PROCEDURE — 93005 ELECTROCARDIOGRAM TRACING: CPT | Performed by: STUDENT IN AN ORGANIZED HEALTH CARE EDUCATION/TRAINING PROGRAM

## 2024-08-09 PROCEDURE — 83735 ASSAY OF MAGNESIUM: CPT

## 2024-08-09 PROCEDURE — 84484 ASSAY OF TROPONIN QUANT: CPT

## 2024-08-09 PROCEDURE — 71045 X-RAY EXAM CHEST 1 VIEW: CPT

## 2024-08-09 PROCEDURE — 99285 EMERGENCY DEPT VISIT HI MDM: CPT

## 2024-08-09 PROCEDURE — 80053 COMPREHEN METABOLIC PANEL: CPT

## 2024-08-09 ASSESSMENT — PAIN - FUNCTIONAL ASSESSMENT: PAIN_FUNCTIONAL_ASSESSMENT: NONE - DENIES PAIN

## 2024-08-09 ASSESSMENT — ENCOUNTER SYMPTOMS
SHORTNESS OF BREATH: 0
NAUSEA: 0
VOMITING: 0

## 2024-08-09 NOTE — ED PROVIDER NOTES
95.1 (*)     MCH 33.8 (*)     MPV 8.5 (*)     Eosinophils % 6.1 (*)     All other components within normal limits   COMPREHENSIVE METABOLIC PANEL - Abnormal; Notable for the following components:    Sodium 130 (*)     Chloride 95 (*)     Glucose 120 (*)     AST 51 (*)     All other components within normal limits   TROPONIN   MAGNESIUM       MDM:  MDM Merlin W Ihnen is a 79 y.o. male with PMH above who presents to the Emergency Department with episodic palpitations Diagnoses considered include dysrhythmia, less likely ACS, MSK chest pain, pleurisy, GERD, pneumonia, pericarditis, aortic dissection. Low suspicion for PE at this time based on absence of risk fx and presentatino not c/w PE. . Patient hemodynamically stable; tamponade physiology unlikely. Given the differential, initial evaluation will include ECG, CXR, CBC, BMP, Tn x2.     ED Course:   - HEARS score calculated to be 3.  - EKG reviewed by me; shows sinus rhythm, no focal ischemic changes, no arrhythmia.  - chest x-ray reviewed by me and shows no focal consolidations, no mediastinal widening, no cardiomegaly, no other acute cardiopulmonary process.  - Lab studies reviewed by me and are significant for no focal abnormality.  - pacemaker report without pertinent finding  - On re-evaluation, pt stable. Repeat Troponin not emergently indicated given duration since he was symptomatic. At this time, ACS is unlikely given the above ECG interpretation and troponin evaluation. Aortic dissection unlikely given lack of widened mediastinum on CXR, pain does not radiate to the back, is not described as tearing, and peripheral pulses noted to be equal in bilateral upper extremities. Pericarditis unlikely as the pain is not positional, no diffuse ST changes on ECG. No sign of pneumonia on CXR. Tamponade physiology unlikely given BP stable, no JVD on exam, no electrical alternans on ECG.     No further workup indicated at this time. Patient is stable and appropriate

## 2024-08-09 NOTE — DISCHARGE INSTRUCTIONS
You can check your heart rate at home with a finger pulse oximeter and if your heart rate is above 110 you could come back to the ER for another EKG and reevaluation.    Please return if your chest pain severely worsens, if you become lightheaded or pass out, if you have severe shortness of breath, or for any other emergent concerns. Otherwise please see your primary care doctor as soon as possible for repeat evaluation and consideration of more testing.

## 2024-08-09 NOTE — TELEPHONE ENCOUNTER
Patient called and said he is having labored breathing at rest, anxiety in his chest and BP is low. I recommended going to the ER. Patient was agreeable and even stated he would call 911 for transport.

## 2024-08-10 LAB
EKG P AXIS: NORMAL DEGREES
EKG P-R INTERVAL: NORMAL MS
EKG Q-T INTERVAL: 380 MS
EKG QRS DURATION: 96 MS
EKG QTC CALCULATION (BAZETT): 415 MS
EKG T AXIS: 46 DEGREES

## 2024-08-10 PROCEDURE — 93010 ELECTROCARDIOGRAM REPORT: CPT | Performed by: INTERNAL MEDICINE

## 2024-08-19 ENCOUNTER — LAB (OUTPATIENT)
Dept: INTERNAL MEDICINE | Facility: CLINIC | Age: 79
End: 2024-08-19
Payer: MEDICARE

## 2024-08-19 DIAGNOSIS — I10 HTN (HYPERTENSION), BENIGN: ICD-10-CM

## 2024-08-19 DIAGNOSIS — E78.00 HIGH CHOLESTEROL: ICD-10-CM

## 2024-08-19 DIAGNOSIS — R73.01 IFG (IMPAIRED FASTING GLUCOSE): Primary | ICD-10-CM

## 2024-08-20 LAB
ALBUMIN SERPL-MCNC: 4.7 G/DL (ref 3.5–5.2)
ALBUMIN/GLOB SERPL: 1.8 G/DL
ALP SERPL-CCNC: 63 U/L (ref 39–117)
ALT SERPL-CCNC: 23 U/L (ref 1–41)
APPEARANCE UR: CLEAR
AST SERPL-CCNC: 54 U/L (ref 1–40)
BACTERIA #/AREA URNS HPF: NORMAL /HPF
BASOPHILS # BLD AUTO: 0.04 10*3/MM3 (ref 0–0.2)
BASOPHILS NFR BLD AUTO: 0.7 % (ref 0–1.5)
BILIRUB SERPL-MCNC: 0.6 MG/DL (ref 0–1.2)
BILIRUB UR QL STRIP: NEGATIVE
BUN SERPL-MCNC: 11 MG/DL (ref 8–23)
BUN/CREAT SERPL: 13.4 (ref 7–25)
CALCIUM SERPL-MCNC: 9.9 MG/DL (ref 8.6–10.5)
CHLORIDE SERPL-SCNC: 98 MMOL/L (ref 98–107)
CHOLEST SERPL-MCNC: 143 MG/DL (ref 0–200)
CO2 SERPL-SCNC: 23.2 MMOL/L (ref 22–29)
COLOR UR: YELLOW
CREAT SERPL-MCNC: 0.82 MG/DL (ref 0.76–1.27)
EGFRCR SERPLBLD CKD-EPI 2021: 88.3 ML/MIN/1.73
EOSINOPHIL # BLD AUTO: 0.18 10*3/MM3 (ref 0–0.4)
EOSINOPHIL NFR BLD AUTO: 3.2 % (ref 0.3–6.2)
EPI CELLS #/AREA URNS HPF: NORMAL /HPF
ERYTHROCYTE [DISTWIDTH] IN BLOOD BY AUTOMATED COUNT: 11.8 % (ref 12.3–15.4)
GLOBULIN SER CALC-MCNC: 2.6 GM/DL
GLUCOSE SERPL-MCNC: 104 MG/DL (ref 65–99)
GLUCOSE UR QL STRIP: NEGATIVE
HBA1C MFR BLD: 5.3 % (ref 4.8–5.6)
HCT VFR BLD AUTO: 42.5 % (ref 37.5–51)
HDLC SERPL-MCNC: 38 MG/DL (ref 40–60)
HGB BLD-MCNC: 14.3 G/DL (ref 13–17.7)
HGB UR QL STRIP: NEGATIVE
IMM GRANULOCYTES # BLD AUTO: 0.01 10*3/MM3 (ref 0–0.05)
IMM GRANULOCYTES NFR BLD AUTO: 0.2 % (ref 0–0.5)
KETONES UR QL STRIP: NEGATIVE
LDLC SERPL CALC-MCNC: 89 MG/DL (ref 0–100)
LEUKOCYTE ESTERASE UR QL STRIP: NEGATIVE
LYMPHOCYTES # BLD AUTO: 1.38 10*3/MM3 (ref 0.7–3.1)
LYMPHOCYTES NFR BLD AUTO: 24.3 % (ref 19.6–45.3)
MCH RBC QN AUTO: 32.8 PG (ref 26.6–33)
MCHC RBC AUTO-ENTMCNC: 33.6 G/DL (ref 31.5–35.7)
MCV RBC AUTO: 97.5 FL (ref 79–97)
MONOCYTES # BLD AUTO: 0.43 10*3/MM3 (ref 0.1–0.9)
MONOCYTES NFR BLD AUTO: 7.6 % (ref 5–12)
NEUTROPHILS # BLD AUTO: 3.63 10*3/MM3 (ref 1.7–7)
NEUTROPHILS NFR BLD AUTO: 64 % (ref 42.7–76)
NITRITE UR QL STRIP: NEGATIVE
NRBC BLD AUTO-RTO: 0 /100 WBC (ref 0–0.2)
PH UR STRIP: 8 [PH] (ref 5–8)
PLATELET # BLD AUTO: 205 10*3/MM3 (ref 140–450)
POTASSIUM SERPL-SCNC: 5.2 MMOL/L (ref 3.5–5.2)
PROT SERPL-MCNC: 7.3 G/DL (ref 6–8.5)
PROT UR QL STRIP: NEGATIVE
RBC # BLD AUTO: 4.36 10*6/MM3 (ref 4.14–5.8)
RBC #/AREA URNS HPF: NORMAL /HPF
SODIUM SERPL-SCNC: 134 MMOL/L (ref 136–145)
SP GR UR STRIP: 1.01 (ref 1–1.03)
TRIGL SERPL-MCNC: 85 MG/DL (ref 0–150)
TSH SERPL DL<=0.005 MIU/L-ACNC: 3.21 UIU/ML (ref 0.27–4.2)
UROBILINOGEN UR STRIP-MCNC: NORMAL MG/DL
VLDLC SERPL CALC-MCNC: 16 MG/DL (ref 5–40)
WBC # BLD AUTO: 5.67 10*3/MM3 (ref 3.4–10.8)
WBC #/AREA URNS HPF: NORMAL /HPF

## 2024-08-22 ENCOUNTER — OFFICE VISIT (OUTPATIENT)
Dept: INTERNAL MEDICINE | Facility: CLINIC | Age: 79
End: 2024-08-22
Payer: MEDICARE

## 2024-08-22 VITALS
DIASTOLIC BLOOD PRESSURE: 72 MMHG | SYSTOLIC BLOOD PRESSURE: 128 MMHG | TEMPERATURE: 97.3 F | HEIGHT: 73 IN | HEART RATE: 69 BPM | BODY MASS INDEX: 28.15 KG/M2 | OXYGEN SATURATION: 96 % | WEIGHT: 212.4 LBS

## 2024-08-22 DIAGNOSIS — Z00.00 ENCOUNTER FOR SUBSEQUENT ANNUAL WELLNESS VISIT (AWV) IN MEDICARE PATIENT: Primary | ICD-10-CM

## 2024-08-22 DIAGNOSIS — K22.70 BARRETT'S ESOPHAGUS WITHOUT DYSPLASIA: ICD-10-CM

## 2024-08-22 DIAGNOSIS — E78.00 HIGH CHOLESTEROL: ICD-10-CM

## 2024-08-22 DIAGNOSIS — I10 HTN (HYPERTENSION), BENIGN: ICD-10-CM

## 2024-08-22 DIAGNOSIS — R39.14 BENIGN PROSTATIC HYPERPLASIA WITH INCOMPLETE BLADDER EMPTYING: ICD-10-CM

## 2024-08-22 DIAGNOSIS — R73.01 ELEVATED FASTING GLUCOSE: ICD-10-CM

## 2024-08-22 DIAGNOSIS — I48.0 PAF (PAROXYSMAL ATRIAL FIBRILLATION): ICD-10-CM

## 2024-08-22 DIAGNOSIS — Z95.0 PACEMAKER: ICD-10-CM

## 2024-08-22 DIAGNOSIS — N40.1 BENIGN PROSTATIC HYPERPLASIA WITH INCOMPLETE BLADDER EMPTYING: ICD-10-CM

## 2024-08-22 DIAGNOSIS — G89.29 CHRONIC BILATERAL LOW BACK PAIN WITHOUT SCIATICA: ICD-10-CM

## 2024-08-22 DIAGNOSIS — M54.50 CHRONIC BILATERAL LOW BACK PAIN WITHOUT SCIATICA: ICD-10-CM

## 2024-08-22 DIAGNOSIS — K21.9 GASTROESOPHAGEAL REFLUX DISEASE, UNSPECIFIED WHETHER ESOPHAGITIS PRESENT: ICD-10-CM

## 2024-08-22 NOTE — ASSESSMENT & PLAN NOTE
Reports continued therapeutic effect with tamsulosin, average nighttime awakening varies depending on how much fluid he drinks before he goes to bed, reports an average of 2-3 times currently.  Denies sensation of urinary retention.  Continue to follow with urology as you have been.

## 2024-08-22 NOTE — PROGRESS NOTES
Subjective   The ABCs of the Annual Wellness Visit  Medicare Wellness Visit      Merlin William Ihnen is a 79 y.o. patient who presents for a Medicare Wellness Visit.    The following portions of the patient's history were reviewed and   updated as appropriate: allergies, current medications, past family history, past medical history, past social history, past surgical history, and problem list.    Compared to one year ago, the patient's physical   health is the same.  Compared to one year ago, the patient's mental   health is the same.    Recent Hospitalizations:  He was not admitted to the hospital during the last year.     Current Medical Providers:  Patient Care Team:  Paulina Lopez APRN as PCP - General (Internal Medicine)  Keyon Ann MD as Consulting Physician (Gastroenterology)    Outpatient Medications Prior to Visit   Medication Sig Dispense Refill    aspirin 81 MG EC tablet Take 1 tablet by mouth Daily. Three times a week      Cholecalciferol (VITAMIN D3) 2000 UNITS capsule Take  by mouth.      fluticasone (FLONASE) 50 MCG/ACT nasal spray 2 sprays into the nostril(s) as directed by provider Daily. 32 g 3    lisinopril (PRINIVIL,ZESTRIL) 10 MG tablet Take 1 tablet by mouth Daily. 90 tablet 3    lovastatin (MEVACOR) 40 MG tablet Take 1 tablet by mouth Every Night. 90 tablet 3    omeprazole (priLOSEC) 40 MG capsule Take 1 capsule by mouth Daily. 90 capsule 3    tamsulosin (FLOMAX) 0.4 MG capsule 24 hr capsule Take 1 capsule by mouth Every Night. 90 capsule 3    therapeutic multivitamin-minerals (THERAGRAN-M) tablet Take  by mouth.       No facility-administered medications prior to visit.     No opioid medication identified on active medication list. I have reviewed chart for other potential  high risk medication/s and harmful drug interactions in the elderly.      Aspirin is on active medication list. Aspirin use is indicated based on review of current medical condition/s. Pros and cons of this  "therapy have been discussed today. Benefits of this medication outweigh potential harm.  Patient has been encouraged to continue taking this medication.  .      Patient Active Problem List   Diagnosis    FHx: prostate cancer    Pacemaker    Peptic ulcer disease    BPH (benign prostatic hyperplasia)    Bradycardia    Erectile dysfunction of organic origin    Gastroesophageal reflux disease    Fleming's esophagus without dysplasia    Nonsmoker    Obesity, unspecified obesity severity, unspecified obesity type    HTN (hypertension), benign    PAF (paroxysmal atrial fibrillation)    Hx of adenomatous colonic polyps    Allergic rhinitis, mild    At low risk for fall    Elevated fasting glucose    High cholesterol    Incisional hernia, without obstruction or gangrene    Negative depression screening    Urinary retention     Advance Care Planning Advance Directive is not on file.  ACP discussion was held with the patient during this visit. Patient does not have an advance directive, information provided.            Objective   Vitals:    08/22/24 1330   BP: 128/72   BP Location: Left arm   Patient Position: Sitting   Cuff Size: Adult   Pulse: 69   Temp: 97.3 °F (36.3 °C)   TempSrc: Infrared   SpO2: 96%   Weight: 96.3 kg (212 lb 6.4 oz)   Height: 185.4 cm (73\")   PainSc:   7   PainLoc: Back  Comment: lower       Estimated body mass index is 28.02 kg/m² as calculated from the following:    Height as of this encounter: 185.4 cm (73\").    Weight as of this encounter: 96.3 kg (212 lb 6.4 oz).    BMI is >= 25 and <30. (Overweight) The following options were offered after discussion;: exercise counseling/recommendations and nutrition counseling/recommendations       Does the patient have evidence of cognitive impairment? No  Lab Results   Component Value Date    CHLPL 143 08/19/2024    TRIG 85 08/19/2024    HDL 38 (L) 08/19/2024    LDL 89 08/19/2024    VLDL 16 08/19/2024    HGBA1C 5.30 08/19/2024                                   "                                                              Health  Risk Assessment    Smoking Status:  Social History     Tobacco Use   Smoking Status Former    Current packs/day: 0.00    Average packs/day: 0.5 packs/day for 10.0 years (5.0 ttl pk-yrs)    Types: Cigarettes    Start date:     Quit date:     Years since quittin.6   Smokeless Tobacco Never   Tobacco Comments    quit over 50 yrs ago     Alcohol Consumption:  Social History     Substance and Sexual Activity   Alcohol Use Yes    Comment: 4-5 times weekly beer       Fall Risk Screen  STEADI Fall Risk Assessment was completed, and patient is at LOW risk for falls.Assessment completed on:2024    Depression Screenin/22/2024     1:40 PM   PHQ-2/PHQ-9 Depression Screening   Little Interest or Pleasure in Doing Things 0-->not at all   Feeling Down, Depressed or Hopeless 0-->not at all   PHQ-9: Brief Depression Severity Measure Score 0     Health Habits and Functional and Cognitive Screenin/22/2024     1:42 PM   Functional & Cognitive Status   Do you have difficulty preparing food and eating? No   Do you have difficulty bathing yourself, getting dressed or grooming yourself? No   Do you have difficulty using the toilet? No   Do you have difficulty moving around from place to place? No   Do you have trouble with steps or getting out of a bed or a chair? No   Current Diet Well Balanced Diet   Dental Exam Up to date   Eye Exam Up to date   Exercise (times per week) 2 times per week   Current Exercises Include Walking;Other        Exercise Comment treadmill   Do you need help using the phone?  No   Are you deaf or do you have serious difficulty hearing?  Yes   Do you need help to go to places out of walking distance? No   Do you need help shopping? No   Do you need help preparing meals?  No   Do you need help with housework?  No   Do you need help with laundry? No   Do you need help taking your medications? No   Do you need help  managing money? No   Do you ever drive or ride in a car without wearing a seat belt? No   Have you felt unusual stress, anger or loneliness in the last month? No   Who do you live with? Spouse   If you need help, do you have trouble finding someone available to you? No   Have you been bothered in the last four weeks by sexual problems? No   Do you have difficulty concentrating, remembering or making decisions? No           Age-appropriate Screening Schedule:  Refer to the list below for future screening recommendations based on patient's age, sex and/or medical conditions. Orders for these recommended tests are listed in the plan section. The patient has been provided with a written plan.    Health Maintenance List  Health Maintenance   Topic Date Due    RSV Vaccine - Adults (1 - 1-dose 60+ series) Never done    COVID-19 Vaccine (6 - 2023-24 season) 09/01/2023    BMI FOLLOWUP  08/17/2024    INFLUENZA VACCINE  08/01/2024    LIPID PANEL  08/19/2025    ANNUAL WELLNESS VISIT  08/22/2025    COLORECTAL CANCER SCREENING  09/09/2026    TDAP/TD VACCINES (2 - Td or Tdap) 05/21/2030    Pneumococcal Vaccine 65+  Completed    ZOSTER VACCINE  Completed                                                                                                                                                CMS Preventative Services Quick Reference  Risk Factors Identified During Encounter  Chronic Pain: Natural history and expected course discussed. Questions answered.    Fall Risk-High or Moderate: Discussed Fall Prevention in the home  Hearing Problem:  Wearing bilateral hearing aids, working appropriately  Immunizations Discussed/Encouraged: Influenza, COVID19, and RSV (Respiratory Syncytial Virus)  Dental Screening Recommended  Vision Screening Recommended    The above risks/problems have been discussed with the patient.  Pertinent information has been shared with the patient in the After Visit Summary.  An After Visit Summary and PPPS  "were made available to the patient.    Follow Up:   Next Medicare Wellness visit to be scheduled in 1 year.         Additional E&M Note during same encounter follows:  Patient has additional, significant, and separately identifiable condition(s)/problem(s) that require work above and beyond the Medicare Wellness Visit     Chief Complaint  Medicare Wellness-subsequent (Labs printed)    Subjective   HPI  Merlin is also being seen today for additional medical problem/s.  He wants to discuss recent ER visit that was a result of him having a sensation of chest discomfort, mild shortness of breath and getting fatigued more easily as well as feeling like his heart was fluttering in his chest.  He went to [facility] ER on 8/9/2024 -labs revealed sodium of 130, electrolytes otherwise unremarkable.  The fluttering in his chest had resolved by the time EMS got him to the ER.  EKG revealed atrial fibrillation, has known history of PAF.  He was discharged in stable condition.  He it was recommended that he discuss getting a stress test completed with PCP/cardiology.  He does follow now with Dr. Ricci at [facility].  He was seen last on 6/25/2024.  He states he has not had any recurrence of his symptoms although he does feel like he is getting fatigued little bit more easily with exertion.  His wife is with him in the office today states that they spend a lot of time outdoors working in the yard and she has been try to make sure he takes appropriate rest breaks and stays adequately hydrated.  He is also wondering what he can do for management of his chronic low back pain.  He does report massage feels good, he is wondering what is best for him Tylenol versus NSAIDs.              Objective   Vital Signs:  /72 (BP Location: Left arm, Patient Position: Sitting, Cuff Size: Adult)   Pulse 69   Temp 97.3 °F (36.3 °C) (Infrared)   Ht 185.4 cm (73\")   Wt 96.3 kg (212 lb 6.4 oz)   SpO2 96%   BMI 28.02 kg/m² "   Physical Exam  Vitals and nursing note reviewed.   Constitutional:       General: He is not in acute distress.     Appearance: Normal appearance. He is overweight. He is not ill-appearing, toxic-appearing or diaphoretic.   HENT:      Head: Normocephalic and atraumatic.      Right Ear: Tympanic membrane, ear canal and external ear normal. There is no impacted cerumen.      Left Ear: Tympanic membrane, ear canal and external ear normal. There is no impacted cerumen.      Nose: Nose normal.      Mouth/Throat:      Mouth: Mucous membranes are moist.      Pharynx: Oropharynx is clear. No oropharyngeal exudate or posterior oropharyngeal erythema.   Eyes:      Extraocular Movements: Extraocular movements intact.      Conjunctiva/sclera: Conjunctivae normal.      Pupils: Pupils are equal, round, and reactive to light.   Neck:      Vascular: No carotid bruit.   Cardiovascular:      Rate and Rhythm: Normal rate. Rhythm irregular.      Pulses: Normal pulses.      Heart sounds: Normal heart sounds.   Pulmonary:      Effort: Pulmonary effort is normal.      Breath sounds: Normal breath sounds.   Abdominal:      General: Bowel sounds are normal.      Palpations: Abdomen is soft.   Musculoskeletal:         General: Tenderness (low back/bilateral, chronic) present. Normal range of motion.      Cervical back: Normal range of motion and neck supple.      Right lower leg: No edema.      Left lower leg: No edema.   Skin:     General: Skin is warm and dry.      Capillary Refill: Capillary refill takes less than 2 seconds.   Neurological:      General: No focal deficit present.      Mental Status: He is alert and oriented to person, place, and time. Mental status is at baseline.   Psychiatric:         Mood and Affect: Mood normal.         Behavior: Behavior normal.         Thought Content: Thought content normal.         Judgment: Judgment normal.         The following data was reviewed by: MARIAN Kunz on 08/22/2024:         Assessment and Plan Additional age appropriate preventative wellness advice topics were discussed during today's preventative wellness exam(some topics already addressed during AWV portion of the note above):    Physical Activity: Advised cardiovascular activity 150 minutes per week as tolerated. (example brisk walk for 30 minutes, 5 days a week).     Nutrition: Discussed nutrition plan with patient. Information shared in after visit summary. Goal is for a well balanced diet to enhance overall health.     Healthy Weight: Discussed current and goal BMI with patient. Steps to attain this goal discussed. Information shared in after visit summary.     Motor Vehicle Safety Discussion:  Wearing Seatbelt While in Motor Vehicle recommendation. Adhering to posted speed limit recommendation.     Injury Prevention Discussion:  Information shared in after visit summary.                    Encounter for subsequent annual wellness visit (AWV) in Medicare patient  Plan of care and recent lab results were reviewed with Mr. Eugene -he reports he has been implementing portion control and trying to eat more healthy food options and has been gradually losing weight, I have recommended that he continue with these measures.  In regards to need for cardiac stress test, we discussed discussing this initially with his cardiologist Dr. Ricci, I did advise if they do not hear anything from his office or if he does have worsening fatigue, develop shortness of breath, or any chest discomfort to reach out.  He does report he would very much like to stay off of anticoagulation therapy unless it is absolutely necessary.  He is understanding of stroke risk with atrial fibrillation.    HTN (hypertension), benign  Hypertension is stable and controlled  Continue current treatment regimen.  Blood pressure will be reassessed in 6 months.  PAF (paroxysmal atrial fibrillation)  See note above  Pacemaker  Following with cardiology, recommend  continuing  High cholesterol   Lipid abnormalities are stable    Plan:  Continue same medication/s without change.      Discussed medication dosage, use, side effects, and goals of treatment in detail.    Counseled patient on lifestyle modifications to help control hyperlipidemia.     Patient Treatment Goals:   LDL goal is under 100    Followup in 6 months.  Elevated fasting glucose  A1c normal, continue with healthy diet, maintain physical activity levels as able  Gastroesophageal reflux disease, unspecified whether esophagitis present  Stable, continue with current treatment regimen  Fleming's esophagus without dysplasia  Stable  Benign prostatic hyperplasia with incomplete bladder emptying  Reports continued therapeutic effect with tamsulosin, average nighttime awakening varies depending on how much fluid he drinks before he goes to bed, reports an average of 2-3 times currently.  Denies sensation of urinary retention.  Continue to follow with urology as you have been.  Chronic bilateral low back pain without sciatica  We discussed utilizing a combination of acetaminophen and over-the-counter NSAIDs, discussed potential risks with either medications, recommended using minimal dose of either agent such as no more than 1000 mg of acetaminophen per day, no more than 400 mg of ibuprofen per day.  We also discussed the option of using topical agents, I did give him several Salonpas patches to trial, and informed him to let me know if therapeutic and if this is the case I could send lidocaine patches to his pharmacy should be covered by insurance.  We also discussed option of getting a massage gun and having regular massages since he did report some relief of his pain with massage application.  If symptoms increase in severity or fail to improve with conservative measures please let me know in the interim.  No orders of the defined types were placed in this encounter.     We additionally discussed importance of keeping  up with routine eye exams, dental exams  Recommended obtaining influenza vaccine and updating COVID-19 vaccine in October  RSV vaccine is available at pharmacy  Remember to utilize sunscreen when exposed to prolonged sunlight  Always use your seatbelt when in motorized vehicle  Return to the office as needed, otherwise anticipate returning in 6 months for recheck.       Follow Up   Return in about 6 months (around 2/22/2025), or if symptoms worsen or fail to improve, for Recheck, 30 minute appt needed.  Patient was given instructions and counseling regarding his condition or for health maintenance advice. Please see specific information pulled into the AVS if appropriate.

## 2024-08-27 ENCOUNTER — TELEPHONE (OUTPATIENT)
Dept: CARDIOLOGY CLINIC | Age: 79
End: 2024-08-27

## 2024-08-27 NOTE — TELEPHONE ENCOUNTER
Pt requested to see Dr. Cardoza instead of Dr. Galeana. He needs a ER follow up with someone in our office as well. So I scheduled patient to see Patricia next week for ongoing symptoms and scheduled patient an appt with Dr. Cardoza for 04/09/24 @ 9:30 am.

## 2024-08-27 NOTE — TELEPHONE ENCOUNTER
Patients wife left voicemail that they would like to see about switching doctors since Dr. Alfred is no longer here and to report that patient went to [facility] ER recently for sob, chest tightness, and feeling tired. She stated the ER mentioned needing  a stress test. PCP advised they call cardiology to discuss stress test. She also said on message that she figured the ER would have sent records to office and that we would have called them for a follow up.

## 2024-09-03 ENCOUNTER — OFFICE VISIT (OUTPATIENT)
Dept: CARDIOLOGY CLINIC | Age: 79
End: 2024-09-03
Payer: MEDICARE

## 2024-09-03 VITALS
BODY MASS INDEX: 28.36 KG/M2 | WEIGHT: 214 LBS | DIASTOLIC BLOOD PRESSURE: 88 MMHG | SYSTOLIC BLOOD PRESSURE: 138 MMHG | HEIGHT: 73 IN | HEART RATE: 50 BPM | OXYGEN SATURATION: 97 %

## 2024-09-03 DIAGNOSIS — I48.0 PAF (PAROXYSMAL ATRIAL FIBRILLATION) (HCC): Primary | ICD-10-CM

## 2024-09-03 DIAGNOSIS — R00.1 BRADYCARDIA: ICD-10-CM

## 2024-09-03 DIAGNOSIS — R06.02 SHORTNESS OF BREATH: ICD-10-CM

## 2024-09-03 DIAGNOSIS — R07.9 CHEST PAIN, UNSPECIFIED TYPE: ICD-10-CM

## 2024-09-03 DIAGNOSIS — Z95.0 PACEMAKER: ICD-10-CM

## 2024-09-03 PROCEDURE — G8427 DOCREV CUR MEDS BY ELIG CLIN: HCPCS | Performed by: CLINICAL NURSE SPECIALIST

## 2024-09-03 PROCEDURE — 99214 OFFICE O/P EST MOD 30 MIN: CPT | Performed by: CLINICAL NURSE SPECIALIST

## 2024-09-03 PROCEDURE — G8417 CALC BMI ABV UP PARAM F/U: HCPCS | Performed by: CLINICAL NURSE SPECIALIST

## 2024-09-03 PROCEDURE — 1036F TOBACCO NON-USER: CPT | Performed by: CLINICAL NURSE SPECIALIST

## 2024-09-03 PROCEDURE — 1123F ACP DISCUSS/DSCN MKR DOCD: CPT | Performed by: CLINICAL NURSE SPECIALIST

## 2024-09-03 NOTE — PROGRESS NOTES
[facility]  [address] [address]  Phone: [phone]  Fax: [phone]    OFFICE VISIT:  9/3/2024    Merlin W Ihnen - : 1945    Reason For Visit:  Merlin is a 79 y.o. male who is here for Follow-up (No symptoms), Atrial Fibrillation (Pacemaker ), and Bradycardia  History of past tobacco abuse, dyslipidemia, hypertension and sinus node dysfunction with pacemaker placement in  and generator replacement in .  Patient had small bowel obstruction in 2023 requiring exploratory lap with omentectomy and lysis of adhesions    Patient establish care with Dr. Galeana in .  Infrequent A-fib.  Recommended closely watching A-fib burden for need to initiate anticoagulation  Patient was evaluated in the emergency room 2024 for palpitations.  No acute findings.    He is here today with his wife.  He reports that around the time he went to the emergency room he just felt bad.  More shortness of breath which was unlike him.  Usually he is quite active without any problems.      Subjective  Merlin denies exertional chest pain, orthopnea, paroxysmal nocturnal dyspnea, syncope, presyncope, arrhythmia, edema and fatigue.  The patient denies numbness or weakness to suggest cerebrovascular accident or transient ischemic attack.    Kate Gibbons APRN - NP is PCP and follows labs including lipids.  Merlin W Ihnen has the following history as [facility] in [facility]:    Patient Active Problem List    Diagnosis Date Noted    Hyperglycemia 2022    COVID-19 2022    SBO (small bowel obstruction) (HCC) 2022    Hyponatremia 2022    BPH (benign prostatic hyperplasia) 06/15/2016    Erectile dysfunction of organic origin 06/15/2016    FHx: prostate cancer 06/15/2016    Peptic ulcer disease     Pacemaker     Bradycardia      Past Medical History:   Diagnosis Date    BCC (basal cell carcinoma of skin)     BPH (benign prostatic hyperplasia) 6/15/2016

## 2024-09-03 NOTE — PATIENT INSTRUCTIONS
Send [employer] home pacemaker reading 12-4-24    Stress test and echo soon       Maintain good blood pressure control-goal<130/80 at rest  Maintain good cholesterol control LDL goal<70 with arterial disease  If you are diabetic work to keep/obtain hemoglobin A1c< 7    Follow up in  April with Dr Cardoza    Call with any questions or concerns  Follow up with Kate Gibbons APRN - NP for non cardiac problems  Report any new problems  Cardiovascular Fitness-Exercise as tolerated.  Strive for 30 minutes of exercise most days of the week.    Cardiac / Healthy Diet- Avoid processed high fat foods, maintain low sodium/salt   Continue current medications as directed  Continue plan of treatment  It is always recommended that you bring your medications bottles with you to each visit - this is for your safety!     Gregory at the [facility] [place] located on the first floor of [facility].   Enter through hospital main entrance and turn immediately to your left.    Patient's contact number:  [phone] (home)      Lexiscan Stress Test      Lexiscan (regadenoson injection) is a prescription drug given through an IV line that increases blood flow through the arteries of the heart during a cardiac nuclear stress test.     There are two parts to a Lexiscan stress test: the rest portion and the exercise portion. For the rest portion, a radioactive tracer is injected into your arm through the IV.  After 30 to 60 minutes, the process of imaging will begin.  A nuclear camera will be placed on your chest area and images are taken for the next 15 to 20 minutes.  For the exercise portion, a nurse will attach EKG electrodes to your chest to monitor your heart rate.  The drug Lexiscan is administered to simulate stress on the heart.  Your heart rhythm will then be monitored for the next few minutes. Your blood pressure will also be monitored throughout the exercise portion.  [place] through the exercise

## 2024-10-17 RX ORDER — FLUTICASONE PROPIONATE 50 UG/1
2 SPRAY, METERED NASAL DAILY
Qty: 32 G | Refills: 3 | Status: SHIPPED | OUTPATIENT
Start: 2024-10-17

## 2024-10-21 ENCOUNTER — HOSPITAL ENCOUNTER (OUTPATIENT)
Dept: NUCLEAR MEDICINE | Age: 79
Discharge: HOME | End: 2024-10-23
Payer: MEDICARE

## 2024-10-21 ENCOUNTER — HOSPITAL ENCOUNTER (OUTPATIENT)
Age: 79
Discharge: HOME | End: 2024-10-23
Payer: MEDICARE

## 2024-10-21 VITALS
DIASTOLIC BLOOD PRESSURE: 80 MMHG | SYSTOLIC BLOOD PRESSURE: 130 MMHG | WEIGHT: 214 LBS | HEIGHT: 73 IN | BODY MASS INDEX: 28.36 KG/M2

## 2024-10-21 DIAGNOSIS — Z95.0 PACEMAKER: ICD-10-CM

## 2024-10-21 DIAGNOSIS — I48.0 PAF (PAROXYSMAL ATRIAL FIBRILLATION) (HCC): ICD-10-CM

## 2024-10-21 DIAGNOSIS — R00.1 BRADYCARDIA: ICD-10-CM

## 2024-10-21 DIAGNOSIS — R06.02 SHORTNESS OF BREATH: ICD-10-CM

## 2024-10-21 DIAGNOSIS — R07.9 CHEST PAIN, UNSPECIFIED TYPE: ICD-10-CM

## 2024-10-21 PROCEDURE — A9502 TC99M TETROFOSMIN: HCPCS | Performed by: CLINICAL NURSE SPECIALIST

## 2024-10-21 PROCEDURE — 93017 CV STRESS TEST TRACING ONLY: CPT

## 2024-10-21 PROCEDURE — 3430000000 HC RX DIAGNOSTIC RADIOPHARMACEUTICAL: Performed by: CLINICAL NURSE SPECIALIST

## 2024-10-21 PROCEDURE — 6360000002 HC RX W HCPCS: Performed by: CLINICAL NURSE SPECIALIST

## 2024-10-21 PROCEDURE — C8929 TTE W OR WO FOL WCON,DOPPLER: HCPCS

## 2024-10-21 PROCEDURE — 6360000004 HC RX CONTRAST MEDICATION: Performed by: CLINICAL NURSE SPECIALIST

## 2024-10-21 RX ORDER — REGADENOSON 0.08 MG/ML
0.4 INJECTION, SOLUTION INTRAVENOUS
Status: COMPLETED | OUTPATIENT
Start: 2024-10-21 | End: 2024-10-21

## 2024-10-21 RX ADMIN — PERFLUTREN 1.5 ML: 6.52 INJECTION, SUSPENSION INTRAVENOUS at 09:55

## 2024-10-21 RX ADMIN — TETROFOSMIN 24 MILLICURIE: 0.23 INJECTION, POWDER, LYOPHILIZED, FOR SOLUTION INTRAVENOUS at 11:20

## 2024-10-21 RX ADMIN — REGADENOSON 0.4 MG: 0.08 INJECTION, SOLUTION INTRAVENOUS at 11:16

## 2024-10-21 RX ADMIN — TETROFOSMIN 8 MILLICURIE: 0.23 INJECTION, POWDER, LYOPHILIZED, FOR SOLUTION INTRAVENOUS at 10:10

## 2024-10-22 LAB
NUC STRESS EJECTION FRACTION: 51 %
STRESS BASELINE DIAS BP: 96 MMHG
STRESS BASELINE HR: 71 BPM
STRESS BASELINE SYS BP: 147 MMHG
STRESS ESTIMATED WORKLOAD: 1 METS
STRESS PEAK DIAS BP: 95 MMHG
STRESS PEAK SYS BP: 152 MMHG
STRESS PERCENT HR ACHIEVED: 65 %
STRESS POST PEAK HR: 90 BPM
STRESS RATE PRESSURE PRODUCT: NORMAL BPM*MMHG
STRESS TARGET HR: 139 BPM

## 2024-10-22 PROCEDURE — 78452 HT MUSCLE IMAGE SPECT MULT: CPT | Performed by: INTERNAL MEDICINE

## 2024-10-22 PROCEDURE — 93016 CV STRESS TEST SUPVJ ONLY: CPT | Performed by: INTERNAL MEDICINE

## 2024-10-22 PROCEDURE — 93018 CV STRESS TEST I&R ONLY: CPT | Performed by: INTERNAL MEDICINE

## 2024-10-23 LAB
ECHO AO ASC DIAM: 3.4 CM
ECHO AO ASCENDING AORTA INDEX: 1.54 CM/M2
ECHO AO ROOT DIAM: 1.6 CM
ECHO AO ROOT INDEX: 0.72 CM/M2
ECHO AO SINUS VALSALVA DIAM: 3.9 CM
ECHO AO SINUS VALSALVA INDEX: 1.76 CM/M2
ECHO AO ST JNCT DIAM: 2.9 CM
ECHO AV AREA PEAK VELOCITY: 4.8 CM2
ECHO AV AREA VTI: 4.6 CM2
ECHO AV AREA/BSA PEAK VELOCITY: 2.2 CM2/M2
ECHO AV AREA/BSA VTI: 2.1 CM2/M2
ECHO AV MEAN GRADIENT: 2 MMHG
ECHO AV MEAN GRADIENT: 2 MMHG
ECHO AV MEAN VELOCITY: 0.7 M/S
ECHO AV PEAK GRADIENT: 4 MMHG
ECHO AV PEAK VELOCITY: 1.1 M/S
ECHO AV VELOCITY RATIO: 0.82
ECHO AV VTI: 22.9 CM
ECHO BSA: 2.24 M2
ECHO EST RA PRESSURE: 3 MMHG
ECHO IVC PROX: 1.9 CM
ECHO LA AREA 2C: 17.6 CM2
ECHO LA AREA 4C: 16.5 CM2
ECHO LA DIAMETER INDEX: 1.54 CM/M2
ECHO LA DIAMETER: 3.4 CM
ECHO LA MAJOR AXIS: 5 CM
ECHO LA MINOR AXIS: 5.4 CM
ECHO LA TO AORTIC ROOT RATIO: 2.13
ECHO LA VOL BP: 47 ML (ref 18–58)
ECHO LA VOL MOD A2C: 47 ML (ref 18–58)
ECHO LA VOL MOD A4C: 44 ML (ref 18–58)
ECHO LA VOL/BSA BIPLANE: 21 ML/M2 (ref 16–34)
ECHO LA VOLUME INDEX MOD A2C: 21 ML/M2 (ref 16–34)
ECHO LA VOLUME INDEX MOD A4C: 20 ML/M2 (ref 16–34)
ECHO LV E' LATERAL VELOCITY: 7.3 CM/S
ECHO LV E' SEPTAL VELOCITY: 5.2 CM/S
ECHO LV EDV A2C: 157 ML
ECHO LV EDV A4C: 172 ML
ECHO LV EDV INDEX A4C: 78 ML/M2
ECHO LV EDV NDEX A2C: 71 ML/M2
ECHO LV EJECTION FRACTION A2C: 62 %
ECHO LV EJECTION FRACTION A4C: 60 %
ECHO LV EJECTION FRACTION BIPLANE: 63 % (ref 55–100)
ECHO LV ESV A2C: 59 ML
ECHO LV ESV A4C: 68 ML
ECHO LV ESV INDEX A2C: 27 ML/M2
ECHO LV ESV INDEX A4C: 31 ML/M2
ECHO LV FRACTIONAL SHORTENING: 30 % (ref 28–44)
ECHO LV INTERNAL DIMENSION DIASTOLE INDEX: 1.99 CM/M2
ECHO LV INTERNAL DIMENSION DIASTOLIC: 4.4 CM (ref 4.2–5.9)
ECHO LV INTERNAL DIMENSION SYSTOLIC INDEX: 1.4 CM/M2
ECHO LV INTERNAL DIMENSION SYSTOLIC: 3.1 CM
ECHO LV IVSD: 1.1 CM (ref 0.6–1)
ECHO LV MASS 2D: 147.8 G (ref 88–224)
ECHO LV MASS INDEX 2D: 66.9 G/M2 (ref 49–115)
ECHO LV POSTERIOR WALL DIASTOLIC: 0.9 CM (ref 0.6–1)
ECHO LV RELATIVE WALL THICKNESS RATIO: 0.41
ECHO LVOT AREA: 5.7 CM2
ECHO LVOT AV VTI INDEX: 0.81
ECHO LVOT DIAM: 2.7 CM
ECHO LVOT MEAN GRADIENT: 1 MMHG
ECHO LVOT MEAN GRADIENT: 1 MMHG
ECHO LVOT PEAK GRADIENT: 3 MMHG
ECHO LVOT PEAK VELOCITY: 0.9 M/S
ECHO LVOT STROKE VOLUME INDEX: 47.9 ML/M2
ECHO LVOT SV: 105.9 ML
ECHO LVOT VTI: 18.5 CM
ECHO MV A VELOCITY: 0.77 M/S
ECHO MV AREA VTI: 4.5 CM2
ECHO MV E DECELERATION TIME (DT): 259 MS
ECHO MV E VELOCITY: 0.42 M/S
ECHO MV E/A RATIO: 0.55
ECHO MV E/E' LATERAL: 5.75
ECHO MV E/E' RATIO (AVERAGED): 6.92
ECHO MV E/E' SEPTAL: 8.08
ECHO MV LVOT VTI INDEX: 1.26
ECHO MV MAX VELOCITY: 0.7 M/S
ECHO MV MEAN GRADIENT: 1 MMHG
ECHO MV MEAN VELOCITY: 0.4 M/S
ECHO MV PEAK GRADIENT: 2 MMHG
ECHO MV VTI: 23.3 CM
ECHO RA AREA 4C: 12.6 CM2
ECHO RA END SYSTOLIC VOLUME APICAL 4 CHAMBER INDEX BSA: 14 ML/M2
ECHO RA VOLUME: 32 ML
ECHO RIGHT VENTRICULAR SYSTOLIC PRESSURE (RVSP): 26 MMHG
ECHO RV BASAL DIMENSION: 3.9 CM
ECHO RV INTERNAL DIMENSION: 2.7 CM
ECHO RV LONGITUDINAL DIMENSION: 7 CM
ECHO RV MID DIMENSION: 3.1 CM
ECHO RV TAPSE: 1.5 CM (ref 1.7–?)
ECHO TV REGURGITANT MAX VELOCITY: 2.4 M/S
ECHO TV REGURGITANT PEAK GRADIENT: 23 MMHG

## 2024-10-23 PROCEDURE — 93306 TTE W/DOPPLER COMPLETE: CPT | Performed by: INTERNAL MEDICINE

## 2024-11-06 ENCOUNTER — TELEPHONE (OUTPATIENT)
Dept: INTERNAL MEDICINE | Facility: CLINIC | Age: 79
End: 2024-11-06

## 2025-01-14 DIAGNOSIS — K21.9 GASTROESOPHAGEAL REFLUX DISEASE: ICD-10-CM

## 2025-01-14 RX ORDER — OMEPRAZOLE 40 MG/1
40 CAPSULE, DELAYED RELEASE ORAL DAILY
Qty: 90 CAPSULE | Refills: 3 | Status: SHIPPED | OUTPATIENT
Start: 2025-01-14

## 2025-01-16 NOTE — PROGRESS NOTES
Subjective    Mr. Eugene is 79 y.o. male    Chief Complaint: BPH    History of Present Illness  Benign Prostatic Hypertrophy  Patient complains of lower urinary tract symptoms. He reports frequency, incomplete emptying, intermittency, nocturia one time a night, urgency, and weak stream. He denies straining. Patient states symptoms are of moderate severity. Onset of symptoms was a few years ago and was gradual in onset. His AUA Symptom Score is, 13/35.He reports a history of no complicating symptoms. He denies flank pain, gross hematuria, kidney stones, and recurrent UTI.  Patient has tried Alpha blockers with improvement.         The following portions of the patient's history were reviewed and updated as appropriate: allergies, current medications, past family history, past medical history, past social history, past surgical history and problem list.    Review of Systems   Genitourinary:  Negative for difficulty urinating, frequency and urgency.         Current Outpatient Medications:     aspirin 81 MG EC tablet, Take 1 tablet by mouth Daily. Three times a week, Disp: , Rfl:     Cholecalciferol (VITAMIN D3) 2000 UNITS capsule, Take  by mouth., Disp: , Rfl:     fluticasone (FLONASE) 50 MCG/ACT nasal spray, USE 2 SPRAYS INTO THE NOSTRIL(S) AS DIRECTED BY PROVIDER DAILY, Disp: 32 g, Rfl: 3    lisinopril (PRINIVIL,ZESTRIL) 10 MG tablet, Take 1 tablet by mouth Daily., Disp: 90 tablet, Rfl: 3    lovastatin (MEVACOR) 40 MG tablet, Take 1 tablet by mouth Every Night., Disp: 90 tablet, Rfl: 3    omeprazole (priLOSEC) 40 MG capsule, TAKE 1 CAPSULE DAILY, Disp: 90 capsule, Rfl: 3    tamsulosin (FLOMAX) 0.4 MG capsule 24 hr capsule, Take 1 capsule by mouth Every Night., Disp: 90 capsule, Rfl: 3    therapeutic multivitamin-minerals (THERAGRAN-M) tablet, Take  by mouth., Disp: , Rfl:     Past Medical History:   Diagnosis Date    Atrial fibrillation     Cancer     Elevated liver enzymes     Gastrointestinal stromal tumor (GIST)   "   GERD (gastroesophageal reflux disease)     Pacemaker     Squamous cell skin cancer        Past Surgical History:   Procedure Laterality Date    COLON SURGERY      COLONOSCOPY N/A 2021    Normal exam repeat in 5 years    COLONOSCOPY W/ POLYPECTOMY  2016    Polyp at 70 cm proximal to anus no changes of adenomatous or hyperplastic polyp identified repeat exam in 5 years    COLONOSCOPY W/ POLYPECTOMY  2013    Early Tubular adenoma repeat exam in 3 years    ENDOSCOPY  2016    Chronic esophagogastritis mild with Intestinal Metaplasia repeat exam in 3 years    ENDOSCOPY  2013    Mild chronic inflammation negative for Intestinal Metaplasia     ENDOSCOPY N/A 10/02/2019    Focal Intestinal Metaplasia repeat exam in 3 years    ENDOSCOPY N/A 2022    Procedure: ESOPHAGOGASTRODUODENOSCOPY WITH ANESTHESIA;  Surgeon: Keyon Ann MD;  Location: [facility] ENDOSCOPY;  Service: Gastroenterology;  Laterality: N/A;  pre: barretts  post: barretts. gastric polyps.   Paulina Lopez C, APRN    NECK SURGERY      PACEMAKER IMPLANTATION      SQUAMOUS CELL CARCINOMA EXCISION         Social History     Socioeconomic History    Marital status:    Tobacco Use    Smoking status: Former     Current packs/day: 0.00     Average packs/day: 0.5 packs/day for 10.0 years (5.0 ttl pk-yrs)     Types: Cigarettes     Start date:      Quit date:      Years since quittin.0    Smokeless tobacco: Never    Tobacco comments:     quit over 50 yrs ago   Vaping Use    Vaping status: Never Used   Substance and Sexual Activity    Alcohol use: Yes     Comment: 4-5 times weekly beer    Drug use: No    Sexual activity: Yes     Partners: Female       Family History   Problem Relation Age of Onset    Heart disease Mother     No Known Problems Father     Colon polyps Neg Hx     Colon cancer Neg Hx        Objective    Temp 97 °F (36.1 °C) (Infrared)   Ht 185.4 cm (73\")   Wt 96.6 kg (213 lb)   BMI 28.10 kg/m² "     Physical Exam  Vitals reviewed.   Constitutional:       Appearance: Normal appearance.   HENT:      Head: Normocephalic and atraumatic.   Pulmonary:      Effort: Pulmonary effort is normal.   Skin:     Coloration: Skin is not pale.   Neurological:      Mental Status: He is alert.   Psychiatric:         Mood and Affect: Mood normal.         Behavior: Behavior normal.             Results for orders placed or performed in visit on 01/22/25   POC Urinalysis Dipstick, Multipro    Collection Time: 01/22/25 11:22 AM    Specimen: Urine   Result Value Ref Range    Color Yellow Yellow, Straw, Dark Yellow, Umm    Clarity, UA Clear Clear    Glucose, UA Negative Negative mg/dL    Bilirubin Negative Negative    Ketones, UA Negative Negative    Specific Gravity  1.015 1.005 - 1.030    Blood, UA Negative Negative    pH, Urine 7.0 5.0 - 8.0    Protein, POC Negative Negative mg/dL    Urobilinogen, UA 0.2 E.U./dL Normal, 0.2 E.U./dL    Nitrite, UA Negative Negative    Leukocytes Negative Negative   IPSS Questionnaire (AUA-7):  Incomplete emptying  Over the past month, how often have you had a sensation of not emptying your bladder completely after you finished urinating?: About half the time (01/22/25 1124)  Frequency  Over the past month, how often have you had to urinate again less than two hours after you finishied urinating ?: Less than half the time (01/22/25 1124)  Intermittency  Over the past month, how often have you found you stopped and started again several time when you urinated ?: Less than 1 time in 5 (01/22/25 1124)  Urgency  Over the last month, how often have you found it difficult  have you found it difficult to postpone urination ?: Less than 1 time in 5 (01/22/25 1124)  Weak Stream  Over the past month, how often have you had a weak urinary stream ?: About half the time (01/22/25 1124)  Straining  Over the past month, how often have you had to push or strain to begin urination ?: Not at all (01/22/25  1124)  Nocturia  Over the past month, how many times did you most typically get up to urinate from the time you went to bed until the time you got up in the morning ?: 1 time (01/22/25 1124)  Quality of life due to urinary symptoms  If you were to spend the rest of your life with your urinary condition the way it is now, how would feel about that?: Mixed - about equally satisfied (01/22/25 1124)    Scores  Total IPSS Score: (!) 11 (01/22/25 1124)  Total Score = Symptomatic Level: Moderately symptomatic: 8-19 (01/22/25 1124)       Assessment and Plan    Diagnoses and all orders for this visit:    1. Benign prostatic hyperplasia with urinary retention (Primary)  -     POC Urinalysis Dipstick, Multipro      Patient here for annual follow-up patient doing well on tamsulosin no refills needed today.  AUA score 35.  His urine is clear.    Continue tamsulosin follow-up 1 year.

## 2025-01-22 ENCOUNTER — OFFICE VISIT (OUTPATIENT)
Dept: UROLOGY | Facility: CLINIC | Age: 80
End: 2025-01-22
Payer: MEDICARE

## 2025-01-22 VITALS — BODY MASS INDEX: 28.23 KG/M2 | HEIGHT: 73 IN | TEMPERATURE: 97 F | WEIGHT: 213 LBS

## 2025-01-22 DIAGNOSIS — R33.8 BENIGN PROSTATIC HYPERPLASIA WITH URINARY RETENTION: Primary | ICD-10-CM

## 2025-01-22 DIAGNOSIS — N40.1 BENIGN PROSTATIC HYPERPLASIA WITH URINARY RETENTION: Primary | ICD-10-CM

## 2025-01-22 LAB
BILIRUB BLD-MCNC: NEGATIVE MG/DL
CLARITY, POC: CLEAR
COLOR UR: YELLOW
GLUCOSE UR STRIP-MCNC: NEGATIVE MG/DL
KETONES UR QL: NEGATIVE
LEUKOCYTE EST, POC: NEGATIVE
NITRITE UR-MCNC: NEGATIVE MG/ML
PH UR: 7 [PH] (ref 5–8)
PROT UR STRIP-MCNC: NEGATIVE MG/DL
RBC # UR STRIP: NEGATIVE /UL
SP GR UR: 1.01 (ref 1–1.03)
UROBILINOGEN UR QL: NORMAL

## 2025-01-24 ENCOUNTER — TELEPHONE (OUTPATIENT)
Dept: CARDIOLOGY CLINIC | Age: 80
End: 2025-01-24

## 2025-01-29 DIAGNOSIS — Z95.0 PACEMAKER: Primary | ICD-10-CM

## 2025-01-29 DIAGNOSIS — R00.1 BRADYCARDIA: ICD-10-CM

## 2025-01-30 DIAGNOSIS — R33.8 BENIGN PROSTATIC HYPERPLASIA WITH URINARY RETENTION: ICD-10-CM

## 2025-01-30 DIAGNOSIS — N40.1 BENIGN PROSTATIC HYPERPLASIA WITH URINARY RETENTION: ICD-10-CM

## 2025-01-30 RX ORDER — TAMSULOSIN HYDROCHLORIDE 0.4 MG/1
1 CAPSULE ORAL NIGHTLY
Qty: 90 CAPSULE | Refills: 3 | Status: SHIPPED | OUTPATIENT
Start: 2025-01-30

## 2025-01-30 RX ORDER — LOVASTATIN 40 MG/1
40 TABLET ORAL NIGHTLY
Qty: 90 TABLET | Refills: 3 | Status: SHIPPED | OUTPATIENT
Start: 2025-01-30

## 2025-01-30 RX ORDER — LISINOPRIL 10 MG/1
10 TABLET ORAL DAILY
Qty: 90 TABLET | Refills: 3 | Status: SHIPPED | OUTPATIENT
Start: 2025-01-30

## 2025-02-06 ENCOUNTER — LAB (OUTPATIENT)
Dept: INTERNAL MEDICINE | Facility: CLINIC | Age: 80
End: 2025-02-06
Payer: MEDICARE

## 2025-02-06 DIAGNOSIS — E78.00 HIGH CHOLESTEROL: ICD-10-CM

## 2025-02-06 DIAGNOSIS — R73.01 ELEVATED FASTING GLUCOSE: ICD-10-CM

## 2025-02-06 DIAGNOSIS — I10 HTN (HYPERTENSION), BENIGN: Primary | ICD-10-CM

## 2025-02-07 LAB
ALBUMIN SERPL-MCNC: 4.3 G/DL (ref 3.5–5.2)
ALBUMIN/GLOB SERPL: 1.5 G/DL
ALP SERPL-CCNC: 63 U/L (ref 39–117)
ALT SERPL-CCNC: 20 U/L (ref 1–41)
AST SERPL-CCNC: 58 U/L (ref 1–40)
BILIRUB SERPL-MCNC: 0.5 MG/DL (ref 0–1.2)
BUN SERPL-MCNC: 12 MG/DL (ref 8–23)
BUN/CREAT SERPL: 12.8 (ref 7–25)
CALCIUM SERPL-MCNC: 9.1 MG/DL (ref 8.6–10.5)
CHLORIDE SERPL-SCNC: 99 MMOL/L (ref 98–107)
CHOLEST SERPL-MCNC: 152 MG/DL (ref 0–200)
CO2 SERPL-SCNC: 17 MMOL/L (ref 22–29)
CREAT SERPL-MCNC: 0.94 MG/DL (ref 0.76–1.27)
EGFRCR SERPLBLD CKD-EPI 2021: 80.9 ML/MIN/1.73
GLOBULIN SER CALC-MCNC: 2.8 GM/DL
GLUCOSE SERPL-MCNC: 85 MG/DL (ref 65–99)
HBA1C MFR BLD: 5.5 % (ref 4.8–5.6)
HDLC SERPL-MCNC: 37 MG/DL (ref 40–60)
LDLC SERPL CALC-MCNC: 102 MG/DL (ref 0–100)
POTASSIUM SERPL-SCNC: 5.1 MMOL/L (ref 3.5–5.2)
PROT SERPL-MCNC: 7.1 G/DL (ref 6–8.5)
SODIUM SERPL-SCNC: 136 MMOL/L (ref 136–145)
TRIGL SERPL-MCNC: 65 MG/DL (ref 0–150)
VLDLC SERPL CALC-MCNC: 13 MG/DL (ref 5–40)

## 2025-02-11 ENCOUNTER — OFFICE VISIT (OUTPATIENT)
Dept: INTERNAL MEDICINE | Facility: CLINIC | Age: 80
End: 2025-02-11
Payer: MEDICARE

## 2025-02-11 VITALS
HEART RATE: 70 BPM | SYSTOLIC BLOOD PRESSURE: 118 MMHG | HEIGHT: 73 IN | TEMPERATURE: 97.4 F | DIASTOLIC BLOOD PRESSURE: 68 MMHG | OXYGEN SATURATION: 97 % | WEIGHT: 212.6 LBS | BODY MASS INDEX: 28.18 KG/M2

## 2025-02-11 DIAGNOSIS — Z95.0 PACEMAKER: ICD-10-CM

## 2025-02-11 DIAGNOSIS — R00.1 BRADYCARDIA: ICD-10-CM

## 2025-02-11 DIAGNOSIS — Z29.11 NEED FOR RSV IMMUNIZATION: ICD-10-CM

## 2025-02-11 DIAGNOSIS — R33.9 URINARY RETENTION: ICD-10-CM

## 2025-02-11 DIAGNOSIS — I10 HTN (HYPERTENSION), BENIGN: ICD-10-CM

## 2025-02-11 DIAGNOSIS — E78.00 HIGH CHOLESTEROL: Primary | ICD-10-CM

## 2025-02-11 DIAGNOSIS — K22.70 BARRETT'S ESOPHAGUS WITHOUT DYSPLASIA: ICD-10-CM

## 2025-02-11 DIAGNOSIS — N40.1 BENIGN PROSTATIC HYPERPLASIA WITH INCOMPLETE BLADDER EMPTYING: ICD-10-CM

## 2025-02-11 DIAGNOSIS — R39.14 BENIGN PROSTATIC HYPERPLASIA WITH INCOMPLETE BLADDER EMPTYING: ICD-10-CM

## 2025-02-11 PROCEDURE — 1125F AMNT PAIN NOTED PAIN PRSNT: CPT

## 2025-02-11 PROCEDURE — 3074F SYST BP LT 130 MM HG: CPT

## 2025-02-11 PROCEDURE — 3078F DIAST BP <80 MM HG: CPT

## 2025-02-11 PROCEDURE — G2211 COMPLEX E/M VISIT ADD ON: HCPCS

## 2025-02-11 PROCEDURE — 99214 OFFICE O/P EST MOD 30 MIN: CPT

## 2025-02-11 NOTE — PROGRESS NOTES
Subjective   Merlin William Ihnen is a 80 y.o. male.   Chief Complaint   Patient presents with    Hypertension     6 mo f/u;  Denies chest pains and SOB.     High Cholesterol     6 mo f/u          History of Present Illness  The patient is an 80-year-old male who presents to the office today for a routine 6-month follow-up. He is followed primarily for management of hypertension and hypercholesterolemia. He is on lovastatin 40 mg daily for elevated cholesterol and lisinopril 10 mg daily for hypertension. He is on daily 81 mg aspirin. He also has notable history of significant bradycardia, has pacemaker in place. Also has a history of paroxysmal atrial fibrillation. He does follow with cardiology at [facility]. He also has had issues in the recent years with BPH contributing to urinary retention. He does follow with urology for this and is on Flomax he also has Fleming's esophagus, esophagus without dysplasia. He is on omeprazole 40 mg daily. He takes vitamin D and Flonase as well as a multivitamin.    He reports a general sense of well-being, with no significant health concerns. He has been maintaining a mostly homebound lifestyle, with occasional outings to [place], grocery stores, and restaurants. He received his influenza vaccine in 10/2024 at [facility]. He has not yet received the RSV vaccine.  He has been incorporating more salt into his diet (to help combat hyponatremia) but reports no changes in energy levels or fatigue. He does not monitor his blood pressure at home. He reports no abdominal pain, nausea, belching, or reflux. He experiences frequent stomach growling, a symptom that has persisted since his surgery for a stomach tumor. He underwent a subsequent surgery due to scar tissue causing a blockage. He reports no chest pain or shortness of breath. He has regular bowel movements. He has been experiencing issues with short-term memory, particularly with names, but uses his iPhone for  reminders. He engages in mental exercises such as chess. He aims to walk 5 days a week but typically manages 4 days. He has a treadmill in his basement and walks until he burns 200 calories, which usually takes slightly over a mile. He does not engage in strength training exercises.    He has a history of significant bradycardia and has a pacemaker in place. He follows up with cardiology at [facility]. He has a scheduled appointment with Dr. Tabares, a cardiologist, in 04/2025 for pacemaker interrogation. He reports no leg swelling.    He has a history of paroxysmal atrial fibrillation and follows up with cardiology at [facility].    He has had issues with benign prostatic hyperplasia (BPH) contributing to urinary retention in recent years. He follows up with urology for this condition and is on Flomax. He reports improvement in his urinary symptoms and continues to follow up with Franck Bird. His last visit was in 01/2025, during which all parameters were reported as normal. He is uncertain about the status of his PSA levels.    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    He has been experiencing back pain, which he describes as tolerable. The pain subsides during the day but recurs if he lifts heavy objects. He has a high pain threshold and does not frequently use Tylenol. He reports no unusual findings related to his heart or pulse on his watch. He has attempted to use Salonpas patches for relief but experienced skin irritation. He has also tried Voltaren gel, which caused nausea, and a heating pad, which provided some relief.    ALLERGIES  The patient is allergic to ADHESIVE.    MEDICATIONS  Current: lovastatin, lisinopril, aspirin, Flomax, omeprazole, vitamin D, Flonase, multivitamin  Past: Voltaren gel, Salonpas    IMMUNIZATIONS  He received the influenza vaccine in October 2024.       The following portions of the patient's history were reviewed and updated as  appropriate: allergies, current medications, past family history, past medical history, past social history, past surgical history and problem list.    Review of Systems    Objective   Past Medical History:   Diagnosis Date    Atrial fibrillation     Cancer     Elevated liver enzymes     Gastrointestinal stromal tumor (GIST)     GERD (gastroesophageal reflux disease)     Pacemaker     Squamous cell skin cancer       Past Surgical History:   Procedure Laterality Date    COLON SURGERY      COLONOSCOPY N/A 09/09/2021    Normal exam repeat in 5 years    COLONOSCOPY W/ POLYPECTOMY  07/19/2016    Polyp at 70 cm proximal to anus no changes of adenomatous or hyperplastic polyp identified repeat exam in 5 years    COLONOSCOPY W/ POLYPECTOMY  04/19/2013    Early Tubular adenoma repeat exam in 3 years    ENDOSCOPY  07/19/2016    Chronic esophagogastritis mild with Intestinal Metaplasia repeat exam in 3 years    ENDOSCOPY  04/19/2013    Mild chronic inflammation negative for Intestinal Metaplasia     ENDOSCOPY N/A 10/02/2019    Focal Intestinal Metaplasia repeat exam in 3 years    ENDOSCOPY N/A 11/2/2022    Procedure: ESOPHAGOGASTRODUODENOSCOPY WITH ANESTHESIA;  Surgeon: Keyon Ann MD;  Location: [facility] ENDOSCOPY;  Service: Gastroenterology;  Laterality: N/A;  pre: barretts  post: barretts. gastric polyps.   Paulina Lopez C, APRN    NECK SURGERY      PACEMAKER IMPLANTATION      SQUAMOUS CELL CARCINOMA EXCISION          Current Outpatient Medications:     aspirin 81 MG EC tablet, Take 1 tablet by mouth Daily. Three times a week, Disp: , Rfl:     Cholecalciferol (VITAMIN D3) 2000 UNITS capsule, Take  by mouth., Disp: , Rfl:     fluticasone (FLONASE) 50 MCG/ACT nasal spray, USE 2 SPRAYS INTO THE NOSTRIL(S) AS DIRECTED BY PROVIDER DAILY, Disp: 32 g, Rfl: 3    lisinopril (PRINIVIL,ZESTRIL) 10 MG tablet, TAKE 1 TABLET DAILY, Disp: 90 tablet, Rfl: 3    lovastatin (MEVACOR) 40 MG tablet, TAKE 1 TABLET EVERY NIGHT, Disp: 90  "tablet, Rfl: 3    omeprazole (priLOSEC) 40 MG capsule, TAKE 1 CAPSULE DAILY, Disp: 90 capsule, Rfl: 3    tamsulosin (FLOMAX) 0.4 MG capsule 24 hr capsule, TAKE 1 CAPSULE EVERY NIGHT, Disp: 90 capsule, Rfl: 3    therapeutic multivitamin-minerals (THERAGRAN-M) tablet, Take  by mouth., Disp: , Rfl:     RSVPreF3 Vac Recomb Adjuvanted (AREXVY) 120 MCG/0.5ML reconstituted suspension injection, Inject 0.5 mL into the appropriate muscle as directed by prescriber 1 (One) Time for 1 dose., Disp: 0.5 mL, Rfl: 0      /68 (BP Location: Left arm, Patient Position: Sitting, Cuff Size: Adult)   Pulse 70   Temp 97.4 °F (36.3 °C) (Infrared)   Ht 185.4 cm (73\")   Wt 96.4 kg (212 lb 9.6 oz)   SpO2 97%   BMI 28.05 kg/m²      Body mass index is 28.05 kg/m².          Physical Exam  Vitals and nursing note reviewed.   Constitutional:       General: He is not in acute distress.     Appearance: Normal appearance. He is overweight. He is not ill-appearing, toxic-appearing or diaphoretic.   HENT:      Head: Normocephalic and atraumatic.   Eyes:      Extraocular Movements: Extraocular movements intact.      Conjunctiva/sclera: Conjunctivae normal.      Pupils: Pupils are equal, round, and reactive to light.   Cardiovascular:      Rate and Rhythm: Normal rate and regular rhythm.      Pulses: Normal pulses.      Heart sounds: Normal heart sounds.   Pulmonary:      Effort: Pulmonary effort is normal.      Breath sounds: Normal breath sounds.   Abdominal:      General: Bowel sounds are normal.      Palpations: Abdomen is soft.   Musculoskeletal:         General: Tenderness (Lower back, chronic, reports tolerable.) present.      Cervical back: Normal range of motion and neck supple.      Right lower leg: No edema.      Left lower leg: No edema.   Skin:     General: Skin is warm and dry.   Neurological:      General: No focal deficit present.      Mental Status: He is alert and oriented to person, place, and time. Mental status is at " baseline.   Psychiatric:         Mood and Affect: Mood normal.         Behavior: Behavior normal.         Thought Content: Thought content normal.         Judgment: Judgment normal.               Assessment & Plan   Diagnoses and all orders for this visit:    1. High cholesterol (Primary)    2. HTN (hypertension), benign    3. Bradycardia    4. Pacemaker    5. Fleming's esophagus without dysplasia    6. Benign prostatic hyperplasia with incomplete bladder emptying    7. Urinary retention    8. Need for RSV immunization  -     RSVPreF3 Vac Recomb Adjuvanted (AREXVY) 120 MCG/0.5ML reconstituted suspension injection; Inject 0.5 mL into the appropriate muscle as directed by prescriber 1 (One) Time for 1 dose.  Dispense: 0.5 mL; Refill: 0                 Assessment & Plan  1. Hypertension.  His blood pressure is well-controlled at 118/68 mmHg. He is advised to continue his current medication regimen of lisinopril 10 mg daily. He should monitor his blood pressure at home, especially if he experiences symptoms such as headaches or dizziness, and report any abnormal readings.    2. Hypercholesterolemia.  His lipid panel shows a slight increase in LDL from 89 to 102 since the last evaluation. Total cholesterol is 152, triglycerides are 65, and HDL is 37. He is advised to incorporate more healthy fats into his diet, such as avocados, olive oil, nuts, and fish. Regular exercise is recommended to help manage cholesterol levels. He will continue his current medication regimen of lovastatin 40 mg daily.    3. Bradycardia with pacemaker.  He has a history of significant bradycardia and has a pacemaker in place. He follows up with cardiology at [facility]. He has a scheduled appointment with Dr. Tabares, a cardiologist, in 04/2025 for pacemaker interrogation.    4. Paroxysmal atrial fibrillation.  He has a history of paroxysmal atrial fibrillation and follows up with cardiology at [facility].    5. Benign prostatic hyperplasia  (BPH).  He reports improvement in his urinary symptoms and continues to follow up with Franck Bird. His last visit was in 01/2025, during which all parameters were reported as normal. He will continue his current medication regimen of Flomax.    6. Fleming's esophagus without dysplasia.  He will continue his current medication regimen of omeprazole 40 mg daily.    7. Elevated liver enzymes.  His AST remains elevated at 58, while other liver enzymes are normal. He is advised to maintain a healthy diet rich in fruits, vegetables, and whole grains, and to stay physically active.    8. Back pain.  He experiences intermittent back pain, which worsens with heavy lifting. He is advised to perform core strengthening exercises and consider using a lidocaine gel for pain relief. He can also take Tylenol 500-1500 mg/daily as needed for pain.    9. Health maintenance.  A prescription for the RSV vaccine has been sent to [facility]. He is advised to get the vaccine at his convenience. He is also encouraged to continue his walking routine and consider incorporating light strength training exercises.    Follow-up  The patient is scheduled for a Medicare wellness visit in 08/2025.    PROCEDURE  The patient has a pacemaker in place due to significant bradycardia. He underwent surgery for a stomach tumor and a subsequent surgery for scar tissue blockage.    Patient or patient representative verbalized consent for the use of Ambient Listening during the visit with  MARIAN Kunz for chart documentation. 2/11/2025  17:43 CST    Please note that portions of this note were completed with a voice recognition program.     Electronically signed by MARIAN Kunz, 02/11/25, 17:45 CST.

## 2025-04-09 ENCOUNTER — OFFICE VISIT (OUTPATIENT)
Dept: CARDIOLOGY CLINIC | Age: 80
End: 2025-04-09

## 2025-04-09 VITALS
HEIGHT: 73 IN | HEART RATE: 72 BPM | WEIGHT: 209 LBS | DIASTOLIC BLOOD PRESSURE: 74 MMHG | SYSTOLIC BLOOD PRESSURE: 130 MMHG | BODY MASS INDEX: 27.7 KG/M2

## 2025-04-09 DIAGNOSIS — Z95.0 PACEMAKER: Primary | ICD-10-CM

## 2025-04-09 ASSESSMENT — ENCOUNTER SYMPTOMS
BACK PAIN: 1
BLOOD IN STOOL: 0
COUGH: 0
DIARRHEA: 0
WHEEZING: 0
SHORTNESS OF BREATH: 0
ABDOMINAL DISTENTION: 0
VOMITING: 0
ABDOMINAL PAIN: 0

## 2025-04-09 NOTE — PROGRESS NOTES
Pacemaker interrogated  Presenting rhythm:  AP/VS, AP 64.3%,  5.8%  Battey voltage 12 months  Lead status:  Lead impedance within range and stable  Sensing:  P waves 2.8-4.0 mV,  R waves 15.68-22.4 mV  Thresholds:  Atrial 0.500V @ 0.4ms, ventricular 1.250 V @ 0.4ms  Observations:  None  See scanned report for details  Reprogramming for sensitivity and threshold testing  Next [facility] appointment:  05/13/25

## 2025-04-09 NOTE — PROGRESS NOTES
[facility]  Cardiology Office Note  [address], [place]  [address]  Phone: [phone]  Fax: [phone]                            Date:  4/9/2025  Patient: Merlin W Ihnen  Age:  80 y.o., 1945    Referral: No ref. provider found      PROBLEM LIST:    Patient Active Problem List    Diagnosis Date Noted    Hyperglycemia 12/16/2022     Priority: Medium    COVID-19 12/12/2022     Priority: Medium    SBO (small bowel obstruction) (HCC) 12/11/2022     Priority: Medium    Hyponatremia 12/11/2022     Priority: Medium    BPH (benign prostatic hyperplasia) 06/15/2016     Priority: Low    Erectile dysfunction of organic origin 06/15/2016     Priority: Low    FHx: prostate cancer 06/15/2016     Priority: Low    Peptic ulcer disease      Priority: Low    Pacemaker      Priority: Low    Bradycardia      Priority: Low     1.  Sinoatrial node dysfunction with prior pacemaker placement 2003, generator change 2013, (60% atrial pacing only) normal LV ejection fraction (echo 10/2024) with moderate left atrial enlargement, abnormal Lexiscan study 10/22/2024 (EF 51% with fixed moderate inferior defect), medically managed.  2.  Paroxysmal atrial fibrillation documented by pacemaker interrogation in 2022 (reported 21 hours) without significant recurrence, not on anticoagulation with no CNS events.  2.  Remote tobacco use.  3.  Hypertension.    PRESENTATION: Merlin W Ihnen is a 80 y.o. year old male presents for follow-up evaluation.  For the most part he has been doing quite well with no significant issues.  No chest pain or shortness of breath reported.  Limited mostly by his back.  Does not use any support devices to ambulate.  Manages on his own.  Lives with his wife.  Did have a Lexiscan study 10/2024 when he had some shortness of breath but does not complain of this currently.  Never had chest pain.  Quit tobacco use in 1980.  No leg swelling.    REVIEW OF SYSTEMS:  Review of

## 2025-06-13 RX ORDER — FLUTICASONE PROPIONATE 50 MCG
2 SPRAY, SUSPENSION (ML) NASAL DAILY
Qty: 32 G | Refills: 5 | Status: SHIPPED | OUTPATIENT
Start: 2025-06-13

## 2025-07-10 ENCOUNTER — TELEPHONE (OUTPATIENT)
Dept: CARDIOLOGY CLINIC | Age: 80
End: 2025-07-10

## 2025-07-11 DIAGNOSIS — I48.0 PAF (PAROXYSMAL ATRIAL FIBRILLATION) (HCC): ICD-10-CM

## 2025-07-11 DIAGNOSIS — R00.1 BRADYCARDIA: ICD-10-CM

## 2025-07-11 DIAGNOSIS — Z95.0 PACEMAKER: Primary | ICD-10-CM

## 2025-08-26 ENCOUNTER — TELEPHONE (OUTPATIENT)
Dept: CARDIOLOGY CLINIC | Age: 80
End: 2025-08-26

## 2025-08-27 DIAGNOSIS — Z95.0 PACEMAKER: Primary | ICD-10-CM

## 2025-08-27 DIAGNOSIS — R00.1 BRADYCARDIA: ICD-10-CM

## (undated) DEVICE — THE CHANNEL CLEANING BRUSH IS A NYLON FLEXI BRUSH ATTACHED TO A FLEXIBLE PLASTIC SHEATH DESIGNED TO SAFELY REMOVE DEBRIS FROM FLEXIBLE ENDOSCOPES.

## (undated) DEVICE — SUTURE PERMAHAND SZ 3-0 L18IN NONABSORBABLE BLK L26MM SH C013D

## (undated) DEVICE — GOWN,PREVENTION PLUS,XL,ST,24/CS: Brand: MEDLINE

## (undated) DEVICE — REFLEX ONE, SKIN STAPLER, 35 WIDE: Brand: REFLEX

## (undated) DEVICE — YANKAUER,BULB TIP WITH VENT: Brand: ARGYLE

## (undated) DEVICE — SEALER TISS L20CM DIA13MM ADV BPLR L CRV JAW OPN APPRCH

## (undated) DEVICE — SENSR O2 OXIMAX FNGR A/ 18IN NONSTR

## (undated) DEVICE — TBG SMPL FLTR LINE NASL 02/C02 A/ BX/100

## (undated) DEVICE — CONMED SCOPE SAVER BITE BLOCK, 20X27 MM: Brand: SCOPE SAVER

## (undated) DEVICE — TIBURON LAPAROTOMY DRAPE: Brand: CONVERTORS

## (undated) DEVICE — Device: Brand: DEFENDO AIR/WATER/SUCTION AND BIOPSY VALVE

## (undated) DEVICE — BINDER ABD 2XL H12XL60 75IN UNISX STD E 4 PNL DISPOSABLE

## (undated) DEVICE — TUBE ET 7.5MM NSL ORAL BASIC CUF INTMED MURPHY EYE RADPQ

## (undated) DEVICE — CUFF,BP,DISP,1 TUBE,ADULT,HP: Brand: MEDLINE

## (undated) DEVICE — RELOAD STPL L75MM OPN H3.8MM CLS 1.5MM WIRE DIA0.2MM REG

## (undated) DEVICE — LARYNGOSCOPE BLDE MAC HNDL M SZ 35 ST CURAPLEX CURAVIEW LED

## (undated) DEVICE — DRESSING TRNSPAR W4XL10IN FLM MIC POR SURESITE 123

## (undated) DEVICE — TOTAL TRAY, 16FR 10ML SIL FOLEY, URN: Brand: MEDLINE

## (undated) DEVICE — MASK,OXYGEN,MED CONC,ADLT,7' TUB, UC: Brand: PENDING

## (undated) DEVICE — SOLUTION IV IRRIG POUR BRL 0.9% SODIUM CHL 2F7124

## (undated) DEVICE — STAPLER INT L75MM CUT LN L73MM STPL LN L77MM BLU B FRM 8

## (undated) DEVICE — GAUZE,SPONGE,4"X4",8PLY,STRL,LF,10/TRAY: Brand: MEDLINE

## (undated) DEVICE — SUTURE PDS + SZ 1 L96IN ABSRB VLT L65MM TP-1 1/2 CIR PDP880G

## (undated) DEVICE — FRCP BIOP ENDO CAPTURAPRO SPK SERR 2.8MM 230CM

## (undated) DEVICE — SUTURE VCRL SZ 0 L54IN ABSRB UD LIGAPAK REEL NDL J287G

## (undated) DEVICE — MAJOR CDS

## (undated) DEVICE — ROYAL SILK SURGICAL GOWN, XXL: Brand: CONVERTORS

## (undated) DEVICE — E-Z CLEAN, NON-STICK, PTFE COATED, ELECTROSURGICAL BLADE ELECTRODE, MODIFIED EXTENDED INSULATION, 2.5 INCH (6.35 CM): Brand: MEGADYNE

## (undated) DEVICE — STAPLER INT L60MM REG TISS BLU B FRM 8 FIRING 2 ROW AUTO

## (undated) DEVICE — SUTURE VCRL SZ 3-0 L27IN ABSRB UD L26MM SH 1/2 CIR J416H

## (undated) DEVICE — YANKAUER,POOLE TIP,STERILE,50/CS: Brand: MEDLINE

## (undated) DEVICE — ENDOGATOR AUXILIARY WATER JET CONNECTOR: Brand: ENDOGATOR

## (undated) DEVICE — GLOVE SURG SZ 7 CRM LTX FREE POLYISOPRENE POLYMER BEAD ANTI

## (undated) DEVICE — FRCP BIOP COLD ENDOJAW ALLGTR W/NDL 2.8X2300MM BLU